# Patient Record
Sex: MALE | Race: BLACK OR AFRICAN AMERICAN | Employment: OTHER | URBAN - METROPOLITAN AREA
[De-identification: names, ages, dates, MRNs, and addresses within clinical notes are randomized per-mention and may not be internally consistent; named-entity substitution may affect disease eponyms.]

---

## 2018-04-02 ENCOUNTER — HOSPITAL ENCOUNTER (EMERGENCY)
Age: 43
Discharge: HOME | End: 2018-04-03
Attending: EMERGENCY MEDICINE
Payer: COMMERCIAL

## 2018-04-02 VITALS
DIASTOLIC BLOOD PRESSURE: 87 MMHG | HEIGHT: 69 IN | WEIGHT: 315 LBS | BODY MASS INDEX: 46.65 KG/M2 | TEMPERATURE: 97.9 F | SYSTOLIC BLOOD PRESSURE: 134 MMHG | OXYGEN SATURATION: 97 % | HEART RATE: 102 BPM | RESPIRATION RATE: 18 BRPM

## 2018-04-02 DIAGNOSIS — H66.005 RECURRENT ACUTE SUPPURATIVE OTITIS MEDIA WITHOUT SPONTANEOUS RUPTURE OF LEFT TYMPANIC MEMBRANE: Primary | ICD-10-CM

## 2018-04-02 PROCEDURE — 99283 EMERGENCY DEPT VISIT LOW MDM: CPT

## 2018-04-03 PROCEDURE — 74011250637 HC RX REV CODE- 250/637: Performed by: EMERGENCY MEDICINE

## 2018-04-03 RX ORDER — AMOXICILLIN 250 MG/1
500 CAPSULE ORAL
Status: COMPLETED | OUTPATIENT
Start: 2018-04-03 | End: 2018-04-03

## 2018-04-03 RX ORDER — AMOXICILLIN 500 MG/1
500 TABLET, FILM COATED ORAL 3 TIMES DAILY
Qty: 1 TAB | Refills: 0 | Status: SHIPPED | OUTPATIENT
Start: 2018-04-03 | End: 2018-04-13

## 2018-04-03 RX ADMIN — AMOXICILLIN 500 MG: 250 CAPSULE ORAL at 00:49

## 2018-04-03 NOTE — ED NOTES
Emergency Department Nursing Plan of Care       The Nursing Plan of Care is developed from the Nursing assessment and Emergency Department Attending provider initial evaluation. The plan of care may be reviewed in the ED Provider note.     The Plan of Care was developed with the following considerations:   Patient / Family readiness to learn indicated by:verbalized understanding  Persons(s) to be included in education: patient  Barriers to Learning/Limitations:No    Signed     Marcy Moore RN    4/2/2018   11:38 PM

## 2018-04-03 NOTE — ED PROVIDER NOTES
EMERGENCY DEPARTMENT HISTORY AND PHYSICAL EXAM      Date: 4/2/2018  Patient Name: Arron Oppenheim    History of Presenting Illness     Chief Complaint   Patient presents with    Ear Pain     patient reports to ed with complaints of left ear pain x 2 weeks. patient reports 8/10 pain       History Provided By: Patient    HPI: Arron Oppenheim, 43 y.o. male who presents ambulatory to the ED with cc of gradually worsening left ear pain that onset 2 weeks ago. He pt reports associated pain with swallowing. Pt reports taking tylenol with little relief of his symptoms. He denies a hx of similar symptoms. Pt denies any hearing loss, facial swelling, sore throat, nausea, vomiting, HA, fevers, or chills. + tobacco use (0.5 ppd), + EtOH use, + Illicit drug use    PCP: Tahir Chinchilla MD    There are no other complaints, changes, or physical findings at this time. Current Facility-Administered Medications   Medication Dose Route Frequency Provider Last Rate Last Dose    amoxicillin (AMOXIL) capsule 500 mg  500 mg Oral NOW Jyoti Salmeron MD         Current Outpatient Prescriptions   Medication Sig Dispense Refill    amoxicillin 500 mg tab Take 500 mg by mouth three (3) times daily for 10 days. 1 Tab 0    diphenhydrAMINE (BENADRYL) 25 mg capsule Take 25 mg by mouth every six (6) hours as needed.  traMADol (ULTRAM) 50 mg tablet Take 1 Tab by mouth every six (6) hours as needed for Pain. Max Daily Amount: 200 mg. 20 Tab 0       Past History     Past Medical History:  History reviewed. No pertinent past medical history. Past Surgical History:  Past Surgical History:   Procedure Laterality Date    HX HERNIA REPAIR         Family History:  History reviewed. No pertinent family history.     Social History:  Social History   Substance Use Topics    Smoking status: Current Every Day Smoker     Packs/day: 0.50    Smokeless tobacco: Never Used    Alcohol use 1.0 oz/week     2 Cans of beer per week      Comment: social       Allergies: Allergies   Allergen Reactions    Nsaids (Non-Steroidal Anti-Inflammatory Drug) Swelling    Aspirin Angioedema    Ibuprofen Angioedema    Naproxen Angioedema         Review of Systems   Review of Systems   Constitutional: Negative for chills and fever. HENT: Positive for ear pain (left) and trouble swallowing (pain). Negative for congestion, facial swelling, hearing loss, rhinorrhea, sneezing and sore throat. Eyes: Negative for redness and visual disturbance. Respiratory: Negative for shortness of breath. Cardiovascular: Negative for chest pain and leg swelling. Gastrointestinal: Negative for abdominal pain, nausea and vomiting. Genitourinary: Negative for difficulty urinating and frequency. Musculoskeletal: Negative for back pain, myalgias and neck stiffness. Skin: Negative for rash. Neurological: Negative for dizziness, syncope, weakness and headaches. Hematological: Negative for adenopathy. All other systems reviewed and are negative. Physical Exam   Physical Exam   Constitutional: He is oriented to person, place, and time. He appears well-developed and well-nourished. No distress. NAD   HENT:   Head: Normocephalic and atraumatic. Right Ear: Tympanic membrane and ear canal normal.   Left Ear: Ear canal normal. Tympanic membrane is erythematous. Mouth/Throat: Oropharynx is clear and moist. No oropharyngeal exudate or posterior oropharyngeal erythema. Fluid behind left TM   Neck: Normal range of motion and full passive range of motion without pain. Neck supple. Cardiovascular: Normal rate, regular rhythm, normal heart sounds, intact distal pulses and normal pulses. Exam reveals no gallop and no friction rub. No murmur heard. Pulmonary/Chest: Effort normal and breath sounds normal. No accessory muscle usage. No respiratory distress. He has no decreased breath sounds. He has no wheezes. He has no rhonchi. He has no rales.    Lungs are CTA Abdominal: Soft. Bowel sounds are normal. He exhibits no distension. There is no tenderness. There is no rebound, no guarding and no CVA tenderness. Musculoskeletal: Normal range of motion. He exhibits no edema or tenderness. Thoracic back: He exhibits no tenderness and no bony tenderness. Lumbar back: He exhibits no tenderness and no bony tenderness. Lymphadenopathy:     He has no cervical adenopathy. He has no axillary adenopathy. No lymph adenopathy   Neurological: He is alert and oriented to person, place, and time. He has normal strength. He is not disoriented. No cranial nerve deficit or sensory deficit. No focal deficits; 5/5 muscle strength in all extremities   Skin: Skin is warm. No lesion and no rash noted. Rash is not nodular. He is not diaphoretic. Cap refill < 2 seconds   Nursing note and vitals reviewed. Medical Decision Making   I am the first provider for this patient. I reviewed the vital signs, available nursing notes, past medical history, past surgical history, family history and social history. Vital Signs-Reviewed the patient's vital signs. Patient Vitals for the past 12 hrs:   Temp Pulse Resp BP SpO2   04/02/18 2315 97.9 °F (36.6 °C) (!) 102 18 134/87 97 %     Records Reviewed: Nursing Notes and Old Medical Records    Provider Notes (Medical Decision Making):     Otitis media. Unlikely otitis externa. Possible viral illness    ED Course:   Initial assessment performed. The patients presenting problems have been discussed, and they are in agreement with the care plan formulated and outlined with them. I have encouraged them to ask questions as they arise throughout their visit. Disposition:    DISCHARGE NOTE  12:23 AM  The patient has been re-evaluated and is ready for discharge. Reviewed available results with patient. Counseled patient on diagnosis and care plan. Patient has expressed understanding, and all questions have been answered.  Patient agrees with plan and agrees to follow up as recommended, or return to the ED if their symptoms worsen. Discharge instructions have been provided and explained to the patient, along with reasons to return to the ED. PLAN:  1. Current Discharge Medication List      START taking these medications    Details   amoxicillin 500 mg tab Take 500 mg by mouth three (3) times daily for 10 days. Qty: 1 Tab, Refills: 0           2. Follow-up Information     Follow up With Details Comments [address], [place]   [address] [facility],[address]  [address] [place] [id number] [phone]          Return to ED if worse     Diagnosis     Clinical Impression:   1. Recurrent acute suppurative otitis media without spontaneous rupture of left tympanic membrane        Attestations: This note is prepared by Christine Carrion, acting as Scribe for Mulu Heard MD.    Mulu Heard MD: The scribe's documentation has been prepared under my direction and personally reviewed by me in its entirety. I confirm that the note above accurately reflects all work, treatment, procedures, and medical decision making performed by me.

## 2018-04-24 ENCOUNTER — APPOINTMENT (OUTPATIENT)
Dept: CT IMAGING | Age: 43
End: 2018-04-24
Attending: EMERGENCY MEDICINE
Payer: COMMERCIAL

## 2018-04-24 ENCOUNTER — HOSPITAL ENCOUNTER (EMERGENCY)
Age: 43
Discharge: HOME | End: 2018-04-24
Attending: EMERGENCY MEDICINE
Payer: COMMERCIAL

## 2018-04-24 ENCOUNTER — HOSPITAL ENCOUNTER (EMERGENCY)
Age: 43
Discharge: ANOTHER HOSPITAL | End: 2018-04-24
Attending: EMERGENCY MEDICINE | Admitting: EMERGENCY MEDICINE
Payer: COMMERCIAL

## 2018-04-24 VITALS
TEMPERATURE: 97.7 F | DIASTOLIC BLOOD PRESSURE: 86 MMHG | SYSTOLIC BLOOD PRESSURE: 134 MMHG | OXYGEN SATURATION: 97 % | HEART RATE: 86 BPM | RESPIRATION RATE: 18 BRPM

## 2018-04-24 VITALS
SYSTOLIC BLOOD PRESSURE: 132 MMHG | OXYGEN SATURATION: 98 % | HEART RATE: 77 BPM | DIASTOLIC BLOOD PRESSURE: 65 MMHG | RESPIRATION RATE: 19 BRPM | HEIGHT: 69 IN | BODY MASS INDEX: 46.65 KG/M2 | TEMPERATURE: 98.1 F | WEIGHT: 315 LBS

## 2018-04-24 DIAGNOSIS — J36 TONSIL, ABSCESS: Primary | ICD-10-CM

## 2018-04-24 LAB
ANION GAP SERPL CALC-SCNC: 6 MMOL/L (ref 5–15)
BASOPHILS # BLD: 0 K/UL (ref 0–0.1)
BASOPHILS NFR BLD: 0 % (ref 0–1)
BUN SERPL-MCNC: 12 MG/DL (ref 6–20)
BUN/CREAT SERPL: 12 (ref 12–20)
CALCIUM SERPL-MCNC: 8.7 MG/DL (ref 8.5–10.1)
CHLORIDE SERPL-SCNC: 103 MMOL/L (ref 97–108)
CO2 SERPL-SCNC: 29 MMOL/L (ref 21–32)
CREAT SERPL-MCNC: 1 MG/DL (ref 0.7–1.3)
DIFFERENTIAL METHOD BLD: NORMAL
EOSINOPHIL # BLD: 0.2 K/UL (ref 0–0.4)
EOSINOPHIL NFR BLD: 3 % (ref 0–7)
ERYTHROCYTE [DISTWIDTH] IN BLOOD BY AUTOMATED COUNT: 13.2 % (ref 11.5–14.5)
GLUCOSE SERPL-MCNC: 122 MG/DL (ref 65–100)
HCT VFR BLD AUTO: 44.1 % (ref 36.6–50.3)
HGB BLD-MCNC: 14.9 G/DL (ref 12.1–17)
IMM GRANULOCYTES # BLD: 0 K/UL (ref 0–0.04)
IMM GRANULOCYTES NFR BLD AUTO: 0 % (ref 0–0.5)
LYMPHOCYTES # BLD: 3.1 K/UL (ref 0.8–3.5)
LYMPHOCYTES NFR BLD: 38 % (ref 12–49)
MCH RBC QN AUTO: 28.4 PG (ref 26–34)
MCHC RBC AUTO-ENTMCNC: 33.8 G/DL (ref 30–36.5)
MCV RBC AUTO: 84 FL (ref 80–99)
MONOCYTES # BLD: 0.6 K/UL (ref 0–1)
MONOCYTES NFR BLD: 7 % (ref 5–13)
NEUTS SEG # BLD: 4.4 K/UL (ref 1.8–8)
NEUTS SEG NFR BLD: 53 % (ref 32–75)
NRBC # BLD: 0 K/UL (ref 0–0.01)
NRBC BLD-RTO: 0 PER 100 WBC
PLATELET # BLD AUTO: 265 K/UL (ref 150–400)
PMV BLD AUTO: 9.5 FL (ref 8.9–12.9)
POTASSIUM SERPL-SCNC: 3.8 MMOL/L (ref 3.5–5.1)
RBC # BLD AUTO: 5.25 M/UL (ref 4.1–5.7)
SODIUM SERPL-SCNC: 138 MMOL/L (ref 136–145)
WBC # BLD AUTO: 8.3 K/UL (ref 4.1–11.1)

## 2018-04-24 PROCEDURE — 80048 BASIC METABOLIC PNL TOTAL CA: CPT

## 2018-04-24 PROCEDURE — 96375 TX/PRO/DX INJ NEW DRUG ADDON: CPT

## 2018-04-24 PROCEDURE — 99284 EMERGENCY DEPT VISIT MOD MDM: CPT

## 2018-04-24 PROCEDURE — 74011250636 HC RX REV CODE- 250/636: Performed by: EMERGENCY MEDICINE

## 2018-04-24 PROCEDURE — 74011250637 HC RX REV CODE- 250/637: Performed by: EMERGENCY MEDICINE

## 2018-04-24 PROCEDURE — 99283 EMERGENCY DEPT VISIT LOW MDM: CPT

## 2018-04-24 PROCEDURE — 36415 COLL VENOUS BLD VENIPUNCTURE: CPT

## 2018-04-24 PROCEDURE — 96365 THER/PROPH/DIAG IV INF INIT: CPT

## 2018-04-24 PROCEDURE — 70491 CT SOFT TISSUE NECK W/DYE: CPT

## 2018-04-24 PROCEDURE — 85025 COMPLETE CBC W/AUTO DIFF WBC: CPT

## 2018-04-24 PROCEDURE — 74011000258 HC RX REV CODE- 258: Performed by: EMERGENCY MEDICINE

## 2018-04-24 PROCEDURE — 74011636320 HC RX REV CODE- 636/320: Performed by: EMERGENCY MEDICINE

## 2018-04-24 RX ORDER — HYDROCODONE BITARTRATE AND ACETAMINOPHEN 5; 325 MG/1; MG/1
1 TABLET ORAL
Qty: 12 TAB | Refills: 0 | Status: SHIPPED | OUTPATIENT
Start: 2018-04-24 | End: 2018-05-30

## 2018-04-24 RX ORDER — DEXAMETHASONE SODIUM PHOSPHATE 100 MG/10ML
10 INJECTION INTRAMUSCULAR; INTRAVENOUS
Status: COMPLETED | OUTPATIENT
Start: 2018-04-24 | End: 2018-04-24

## 2018-04-24 RX ORDER — HYDROCODONE BITARTRATE AND ACETAMINOPHEN 5; 325 MG/1; MG/1
2 TABLET ORAL
Status: COMPLETED | OUTPATIENT
Start: 2018-04-24 | End: 2018-04-24

## 2018-04-24 RX ORDER — CLINDAMYCIN HYDROCHLORIDE 150 MG/1
300 CAPSULE ORAL
Status: COMPLETED | OUTPATIENT
Start: 2018-04-24 | End: 2018-04-24

## 2018-04-24 RX ORDER — SODIUM CHLORIDE 0.9 % (FLUSH) 0.9 %
5-10 SYRINGE (ML) INJECTION
Status: COMPLETED | OUTPATIENT
Start: 2018-04-24 | End: 2018-04-24

## 2018-04-24 RX ORDER — CLINDAMYCIN HYDROCHLORIDE 300 MG/1
300 CAPSULE ORAL 4 TIMES DAILY
Qty: 28 CAP | Refills: 0 | Status: SHIPPED | OUTPATIENT
Start: 2018-04-24 | End: 2018-05-01

## 2018-04-24 RX ADMIN — Medication 10 ML: at 10:22

## 2018-04-24 RX ADMIN — DEXAMETHASONE SODIUM PHOSPHATE 10 MG: 10 INJECTION INTRAMUSCULAR; INTRAVENOUS at 13:03

## 2018-04-24 RX ADMIN — CLINDAMYCIN HYDROCHLORIDE 300 MG: 150 CAPSULE ORAL at 14:48

## 2018-04-24 RX ADMIN — HYDROCODONE BITARTRATE AND ACETAMINOPHEN 2 TABLET: 5; 325 TABLET ORAL at 09:38

## 2018-04-24 RX ADMIN — SODIUM CHLORIDE 3 G: 900 INJECTION, SOLUTION INTRAVENOUS at 12:13

## 2018-04-24 RX ADMIN — IOPAMIDOL 100 ML: 612 INJECTION, SOLUTION INTRAVENOUS at 10:22

## 2018-04-24 NOTE — LETTER
[address] 
[address] [facility] [id number] 
[phone] Work/School Note Date: 4/24/2018 To Whom It May concern: 
 
Rommel Nuno was seen and treated today in the emergency room by the following provider(s): 
Attending Provider: Melissa Ragland MD.   
 
Rommel Nuno may return to work on 4/27/2018.  
 
Sincerely, 
 
 
 
 
Melissa Ragland MD

## 2018-04-24 NOTE — ED NOTES
Patient has been instructed that they have been given narco which contains opioids, benzodiazepines, or other sedating drugs. Patient is aware that they  will need to refrain from driving or operating heavy machinery after taking this medication. Patient also instructed that they need to avoid drinking alcohol and using other products containing opioids, benzodiazepines, or other sedating drugs. Patient verbalized understanding.

## 2018-04-24 NOTE — ED PROVIDER NOTES
HPI Comments: Pt with lt ear and thrat pain for > 2 weeks, was seen prior and took ABX, has had no improvement at all. Pt states that the pain is constant, lt face, neck and ear, occasionally worsening with episodes of sharp pain    Pt is able to eat and drink, but it does cause pain, no fevers or chills    No chest pain or shortness of breath    The history is provided by the patient and a relative. No past medical history on file. Past Surgical History:   Procedure Laterality Date    HX HERNIA REPAIR           No family history on file. Social History     Social History    Marital status: SINGLE     Spouse name: N/A    Number of children: N/A    Years of education: N/A     Occupational History    Not on file. Social History Main Topics    Smoking status: Current Every Day Smoker     Packs/day: 0.50    Smokeless tobacco: Never Used    Alcohol use 1.0 oz/week     2 Cans of beer per week      Comment: social    Drug use: Yes     Special: Marijuana    Sexual activity: Not on file     Other Topics Concern    Not on file     Social History Narrative         ALLERGIES: Nsaids (non-steroidal anti-inflammatory drug); Aspirin; Ibuprofen; and Naproxen    Review of Systems   Constitutional: Negative for fatigue and fever. HENT: Negative for congestion. Eyes: Negative for visual disturbance. Respiratory: Negative for shortness of breath. Cardiovascular: Negative for chest pain and leg swelling. Gastrointestinal: Negative for abdominal pain. Endocrine: Negative for polyuria. Genitourinary: Negative for dysuria. Skin: Negative for rash. Neurological: Negative for weakness. Psychiatric/Behavioral: Negative for behavioral problems. There were no vitals filed for this visit. Physical Exam   Constitutional: He is oriented to person, place, and time. He appears well-developed and well-nourished. HENT:   Head: Normocephalic and atraumatic.        Right Ear: Tympanic membrane normal.   Left Ear: There is swelling and tenderness. No mastoid tenderness. Tympanic membrane is injected, erythematous and bulging. A middle ear effusion is present. Nose: Mucosal edema present. Mouth/Throat: No trismus in the jaw. No uvula swelling or lacerations. No oropharyngeal exudate or tonsillar abscesses. Pt with some fullness of the soft palate and ?able mild asymmetry     Eyes: Conjunctivae are normal.   Neck: Neck supple. No tracheal deviation present. Cardiovascular: Normal rate, regular rhythm and normal heart sounds. Pulmonary/Chest: Effort normal and breath sounds normal. No respiratory distress. Abdominal: Soft. He exhibits no distension. There is no tenderness. Musculoskeletal: Normal range of motion. He exhibits no edema. Lymphadenopathy:     He has no cervical adenopathy. Neurological: He is alert and oriented to person, place, and time. No cranial nerve deficit. Grossly intact   Skin: Skin is warm and dry. Psychiatric: He has a normal mood and affect. Vitals reviewed. MDM  Number of Diagnoses or Management Options  Tonsil, abscess:         ED Course   Value Comment By Time   Glucose: (!) 122 (Reviewed) Jeremiah Swift MD 04/24 1002    Impression     IMPRESSION: Left tonsillar enlargement with suggestion of a small somewhat  linear fluid collection concerning for tonsillar abscess. Minimal opacification  of the inferior left mastoid air cells.  Jeremiah Swift MD 04/24 1105    Dr Harshil Wallace accepted, will review with ENT to facilitate transfer Jeremiah Swift MD 04/24 1137    Given failed outpt ABX therapy, voice change and morbid obesity, pt would benefit from prompt ENT eval and possibly period of inpt Airway obs Jeremiah Swift MD 04/24 1144       CRITICAL CARE (ASAP ONLY)  Performed by: Tila Almonte by: Hoa Simpson     Critical care provider statement:     Critical care time (minutes):  15    Critical care was necessary to treat or prevent imminent or life-threatening deterioration of the following conditions: airway.     Critical care was time spent personally by me on the following activities:  Examination of patient, discussions with consultants, ordering and performing treatments and interventions, ordering and review of radiographic studies, ordering and review of laboratory studies and review of old charts

## 2018-04-24 NOTE — ED TRIAGE NOTES
Pt arrived in ER with c/o lt ear pain,seen here with same x 2 weeks ago,taking abt as per prescription but no help,till in pain. Neumeric pain scale 10/10. Denies sob,chest pain,n/v/d,fever,chills. Emergency Department Nursing Plan of Care       The Nursing Plan of Care is developed from the Nursing assessment and Emergency Department Attending provider initial evaluation. The plan of care may be reviewed in the ED Provider note.     The Plan of Care was developed with the following considerations:   Patient / Family readiness to learn indicated by:verbalized understanding  Persons(s) to be included in education: patient and family  Barriers to Learning/Limitations:No    Signed     Alvin Carrera RN    4/24/2018   9:23 AM

## 2018-04-24 NOTE — ED NOTES
Patient discharged home by Dr. Andrew Leonard. Prescriptions and discharge instructions given. Patient verbalized understanding of discharge instructions.

## 2018-04-24 NOTE — PROGRESS NOTES
Care Management ED Note    [facility]**  If patient needs to be admitted and is stable, will need to transfer to [facility] or [facility].     Juany Sánchez, CRM

## 2018-04-24 NOTE — DISCHARGE INSTRUCTIONS
Peritonsillar Abscess: Care Instructions  Your Care Instructions    A peritonsillar abscess is a collection of pus that forms in tissues around the tonsils. It can occur as a result of strep throat or another infection. An abscess can cause severe pain and make it very hard to swallow. You will need antibiotics. In some cases, your abscess will have been drained through a needle or small incision. You may have had a sedative to help you relax. You may be unsteady after having sedation. It can take a few hours for the medicine's effects to wear off. Common side effects of sedation include nausea, vomiting, and feeling sleepy or tired. The doctor has checked you carefully, but problems can develop later. If you notice any problems or new symptoms, get medical treatment right away. Follow-up care is a key part of your treatment and safety. Be sure to make and go to all appointments, and call your doctor if you are having problems. It's also a good idea to know your test results and keep a list of the medicines you take. How can you care for yourself at home? · If the doctor gave you a sedative:  ¨ For 24 hours, don't do anything that requires attention to detail. It takes time for the medicine's effects to completely wear off. ¨ For your safety, do not drive or operate any machinery that could be dangerous. Wait until the medicine wears off and you can think clearly and react easily. · Take your antibiotics as directed. Do not stop taking them just because you feel better. You need to take the full course of antibiotics. · Take pain medicines exactly as directed. ¨ If the doctor gave you a prescription medicine for pain, take it as prescribed. ¨ If you are not taking a prescription pain medicine, ask your doctor if you can take an over-the-counter medicine, such as acetaminophen (Tylenol), ibuprofen (Advil, Motrin), or naproxen (Aleve). Read and follow all instructions on the label.   ¨ Do not take two or more pain medicines at the same time unless the doctor told you to. Many pain medicines have acetaminophen, which is Tylenol. Too much acetaminophen (Tylenol) can be harmful. · Gargle with warm salt water once an hour to help reduce swelling and relieve discomfort. Use 1 teaspoon of salt mixed in 8 fluid ounces of warm water. · Get lots of rest.  · Follow your doctor's instructions if your abscess was drained through a needle or small incision. · While your throat is very sore, use liquid nourishment such as soup or high-protein drinks. · Prevent spreading an infection. Wash your hands often, do not sneeze or cough on others, and do not share toothbrushes, eating utensils, or drinking glasses. When should you call for help? Call 911 anytime you think you may need emergency care. For example, call if:  ? · You have trouble breathing. ? · You passed out (lost consciousness). ? · You have a lot of blood coming from the mouth. ?Call your doctor now or seek immediate medical care if:  ? · You have new or worse symptoms of infection, such as:  ¨ Increased pain, swelling, warmth, or redness. ¨ Red streaks coming from the area. ¨ Pus draining from the area. ¨ A fever. ? · You are bleeding. ? · You have new or worse nausea or vomiting. ? · You have new or worse trouble swallowing. ? · You have a hard time drinking fluids. ? Watch closely for changes in your health, and be sure to contact your doctor if:  ? · You do not get better as expected. Where can you learn more? Go to http://venu-sarah.info/. Enter J026 in the search box to learn more about \"Peritonsillar Abscess: Care Instructions. \"  Current as of: May 12, 2017  Content Version: 11.4  © 4903-2183 [employer]. Care instructions adapted under license by [employer] (which disclaims liability or warranty for this information).  If you have questions about a medical condition or this instruction, always ask your healthcare professional. [place] [address] any warranty or liability for your use of this information. We hope that we have addressed all of your medical concerns. The examination and treatment you received in the Emergency Department were for an emergent problem and were not intended as complete care. It is important that you follow up with your healthcare provider(s) for ongoing care. If your symptoms worsen or do not improve as expected, and you are unable to reach your usual health care provider(s), you should return to the Emergency Department. Today's healthcare is undergoing tremendous change, and patient satisfaction surveys are one of the many tools to assess the quality of medical care. You may receive a survey from the [employer] regarding your experience in the Emergency Department. I hope that your experience has been completely positive, particularly the medical care that I provided. As such, please participate in the survey; anything less than excellent does not meet my expectations or intentions. [facility] and [address] [place] [address] participate in nationally recognized quality of care measures. If your blood pressure is greater than 120/80, as reported below, we urge that you seek medical care to address the potential of high blood pressure, commonly known as hypertension. Hypertension can be hereditary or can be caused by certain medical conditions, pain, stress, or \"white coat syndrome. \"       Please make an appointment with your health care provider(s) for follow up of your Emergency Department visit. VITALS:   No data found. Thank you for allowing us to provide you with medical care today. We realize that you have many choices for your emergency care needs. Please choose us in the future for any continued health care needs.       Stacey Pierre, MD [facility] [address]: [phone]            Recent Results (from the past 24 hour(s))   CBC WITH AUTOMATED DIFF    Collection Time: 04/24/18  9:35 AM   Result Value Ref Range    WBC 8.3 4.1 - 11.1 K/uL    RBC 5.25 4. 10 - 5.70 M/uL    HGB 14.9 12.1 - 17.0 g/dL    HCT 44.1 36.6 - 50.3 %    MCV 84.0 80.0 - 99.0 FL    MCH 28.4 26.0 - 34.0 PG    MCHC 33.8 30.0 - 36.5 g/dL    RDW 13.2 11.5 - 14.5 %    PLATELET 628 487 - 027 K/uL    MPV 9.5 8.9 - 12.9 FL    NRBC 0.0 0  WBC    ABSOLUTE NRBC 0.00 0.00 - 0.01 K/uL    NEUTROPHILS 53 32 - 75 %    LYMPHOCYTES 38 12 - 49 %    MONOCYTES 7 5 - 13 %    EOSINOPHILS 3 0 - 7 %    BASOPHILS 0 0 - 1 %    IMMATURE GRANULOCYTES 0 0.0 - 0.5 %    ABS. NEUTROPHILS 4.4 1.8 - 8.0 K/UL    ABS. LYMPHOCYTES 3.1 0.8 - 3.5 K/UL    ABS. MONOCYTES 0.6 0.0 - 1.0 K/UL    ABS. EOSINOPHILS 0.2 0.0 - 0.4 K/UL    ABS. BASOPHILS 0.0 0.0 - 0.1 K/UL    ABS. IMM. GRANS. 0.0 0.00 - 0.04 K/UL    DF AUTOMATED     METABOLIC PANEL, BASIC    Collection Time: 04/24/18  9:35 AM   Result Value Ref Range    Sodium 138 136 - 145 mmol/L    Potassium 3.8 3.5 - 5.1 mmol/L    Chloride 103 97 - 108 mmol/L    CO2 29 21 - 32 mmol/L    Anion gap 6 5 - 15 mmol/L    Glucose 122 (H) 65 - 100 mg/dL    BUN 12 6 - 20 MG/DL    Creatinine 1.00 0.70 - 1.30 MG/DL    BUN/Creatinine ratio 12 12 - 20      GFR est AA >60 >60 ml/min/1.73m2    GFR est non-AA >60 >60 ml/min/1.73m2    Calcium 8.7 8.5 - 10.1 MG/DL       Ct Neck Soft Tissue W Cont    Result Date: 4/24/2018  EXAM:  CT NECK SOFT TISSUE W CONT INDICATION:  Left ear pain for 2 weeks despite antibiotic use COMPARISON: None. CONTRAST: 100 mL of Isovue-300. TECHNIQUE: Multislice helical CT was performed from the mid calvarium to the aortic arch during uneventful rapid bolus intravenous contrast administration. Contiguous 2.5 mm axial images were reconstructed and lung and soft tissue windows were generated.  Coronal and sagittal reformations were generated. CT dose reduction was achieved through use of a standardized protocol tailored for this examination and automatic exposure control for dose modulation. FINDINGS: Left tonsillar enlargement is noted with suggestion of a small but somewhat linear 14 mm fluid collection. . The carotid and jugular vessels enhance normally. The thyroid gland, submandibular salivary glands and parotid glands are normal. No nasopharyngeal, pharyngeal or laryngeal mass is identified. No abnormalities are identified in the visualized portions of the brain or orbits. There is minimal opacification of the inferior left mastoid air cells. Paranasal sinuses are clear. The visualized portions of the lung apices are clear. IMPRESSION: Left tonsillar enlargement with suggestion of a small somewhat linear fluid collection concerning for tonsillar abscess. Minimal opacification of the inferior left mastoid air cells.

## 2018-04-24 NOTE — ED PROVIDER NOTES
HPI Comments: 43 y.o. male with no significant past medical history who presents as a transfer from [facility] for evaluation of a throat abscess. Pt reports that he has had pain in his left ear and throat for more than the last 2 weeks. He reports being seen at least 2 weeks ago, was told that he had a \"fluid bubble\" in his left ear, and was discharged home on a course of amoxicillin that he has now finished without any improvement. He presented to [address] [place] [address] with c/o continued pain and some swelling to the left side of his neck and face. Transferring doctor reported concern for the pt's airway and the pt was brought here to [facility] to have ENT consultation. Pt denies fever. He also denies having any difficulty breathing. He does note that swallowing is painful, but states that he is still able to do so. Pt notes that he was given 2 hydrocodone earlier this morning. Allergic to NSAIDs. There are no other acute medical concerns at this time. PCP: Ryan Cooper MD    Note written by Jaron Hayden, as dictated by Yasmin Brandon MD 2:03 PM      The history is provided by the patient and medical records. No  was used. History reviewed. No pertinent past medical history. Past Surgical History:   Procedure Laterality Date    HX HERNIA REPAIR           History reviewed. No pertinent family history. Social History     Social History    Marital status: SINGLE     Spouse name: N/A    Number of children: N/A    Years of education: N/A     Occupational History    Not on file.      Social History Main Topics    Smoking status: Current Every Day Smoker     Packs/day: 0.50    Smokeless tobacco: Never Used    Alcohol use 1.0 oz/week     2 Cans of beer per week      Comment: social    Drug use: Yes     Special: Marijuana    Sexual activity: Not on file     Other Topics Concern    Not on file     Social History Narrative         ALLERGIES: Nsaids (non-steroidal anti-inflammatory drug); Aspirin; Ibuprofen; and Naproxen    Review of Systems   Constitutional: Negative for diaphoresis and fever. HENT: Positive for ear pain (left), facial swelling (left side and to the left side of the neck with pain. ) and trouble swallowing (painful). Eyes: Negative for visual disturbance. Respiratory: Negative for cough and shortness of breath. Cardiovascular: Negative for chest pain. Gastrointestinal: Negative for abdominal pain. Genitourinary: Negative for dysuria. Musculoskeletal: Negative for joint swelling. Skin: Negative for rash. Neurological: Negative for headaches. Hematological: Negative for adenopathy. Psychiatric/Behavioral: Negative for suicidal ideas. All other systems reviewed and are negative. There were no vitals filed for this visit. Physical Exam   Constitutional: He is oriented to person, place, and time. He appears well-developed and well-nourished. No distress. Obese   HENT:   Head: Normocephalic and atraumatic. Mouth/Throat: Oropharynx is clear and moist.   L sided pharyngeal swelling. Visualization limited by body habitus. Eyes: Pupils are equal, round, and reactive to light. Neck: Normal range of motion. Neck supple. Cardiovascular: Normal rate, regular rhythm, normal heart sounds and intact distal pulses. Pulmonary/Chest: Effort normal and breath sounds normal. No respiratory distress. Abdominal: Soft. Bowel sounds are normal. He exhibits no distension. There is no tenderness. Musculoskeletal: Normal range of motion. He exhibits no edema. Neurological: He is alert and oriented to person, place, and time. Skin: Skin is warm and dry. Nursing note and vitals reviewed. MDM  Number of Diagnoses or Management Options  Tonsil, abscess:         ED Course       Procedures    Pt received in transfer from [facility] [place]. VS stable. Pt resting comfortably. Minimal pain at this time.   No resp distress. Given clindamycin in ED here. Pt stable for outpatient management. CONSULT NOTE:  2:47 PM Mor Silvestre MD communicated with Dr Jose Doty, Consult for ENT via [facility] Text. Discussed available diagnostic tests and clinical findings. He agrees that the pt can be discharged home on clindamycin. He will guarentee f/u within 48 hours.

## 2018-04-24 NOTE — ED TRIAGE NOTES
Triage Note: Patient is a transfer from [facility] for throat abscess. Patient is coming to see ENT. Unasyn was discontinued at [facility] unsure how much of medicine that the patient received prior to leaving facility.

## 2018-04-25 NOTE — PROGRESS NOTES
Documented on 4/25/18:    Spiritual Care Partner Volunteer visited patient in ED on 4/24/18. Documented by:  Meryl Govea M.Div.    Paging Service [phone])

## 2018-05-30 ENCOUNTER — HOSPITAL ENCOUNTER (EMERGENCY)
Age: 43
Discharge: HOME | End: 2018-05-30
Attending: EMERGENCY MEDICINE
Payer: COMMERCIAL

## 2018-05-30 VITALS
TEMPERATURE: 98.1 F | DIASTOLIC BLOOD PRESSURE: 93 MMHG | SYSTOLIC BLOOD PRESSURE: 147 MMHG | WEIGHT: 315 LBS | HEART RATE: 106 BPM | OXYGEN SATURATION: 96 % | HEIGHT: 69 IN | RESPIRATION RATE: 18 BRPM | BODY MASS INDEX: 46.65 KG/M2

## 2018-05-30 DIAGNOSIS — Z98.890 STATUS POST INCISION AND DRAINAGE: Primary | ICD-10-CM

## 2018-05-30 DIAGNOSIS — G89.18 POST-OP PAIN: ICD-10-CM

## 2018-05-30 PROCEDURE — 99282 EMERGENCY DEPT VISIT SF MDM: CPT

## 2018-05-30 RX ORDER — OXYCODONE AND ACETAMINOPHEN 5; 325 MG/1; MG/1
1 TABLET ORAL
Qty: 6 TAB | Refills: 0 | Status: SHIPPED | OUTPATIENT
Start: 2018-05-30 | End: 2018-09-25

## 2018-05-30 NOTE — DISCHARGE INSTRUCTIONS
Acute Pain After Surgery: Care Instructions  Your Care Instructions    It's common to have some pain after surgery. Pain doesn't mean that something is wrong or that the surgery didn't go well. But when the pain is severe, it's important to work with your doctor to manage it. It's also important to be aware of a few facts about pain and pain medicine. · You are the only person who knows what your pain feels like. So be sure to tell your doctor when you are in pain or when the pain changes. Then he or she will know how to adjust your medicines. · Pain is often easier to control right after it starts. So it may be better to take regular doses of pain medicine and not wait until the pain gets bad. · Medicine can help control pain. But this doesn't mean you'll have no pain. Medicine works to keep the pain at a level you can live with. With time, you will feel better. Follow-up care is a key part of your treatment and safety. Be sure to make and go to all appointments, and call your doctor if you are having problems. It's also a good idea to know your test results and keep a list of the medicines you take. How can you care for yourself at home? · Be safe with medicines. Read and follow all instructions on the label. ¨ If the doctor gave you a prescription medicine for pain, take it as prescribed. ¨ If you are not taking a prescription pain medicine, ask your doctor if you can take an over-the-counter medicine. · If you take an over-the-counter pain medicine, such as acetaminophen (Tylenol), ibuprofen (Advil, Motrin), or naproxen (Aleve), read and follow all instructions on the label. · Do not take two or more pain medicines at the same time unless the doctor told you to. · Do not drink alcohol while you are taking pain medicines. · Try to walk each day if your doctor recommends it. Start by walking a little more than you did the day before. Bit by bit, increase the amount you walk.  Walking increases blood flow. It also helps prevent pneumonia and constipation. · To prevent constipation from opioid pain medicines:  ¨ Talk to your doctor about a laxative. ¨ Include fruits, vegetables, beans, and whole grains in your diet each day. These foods are high in fiber. ¨ Drink plenty of fluids, enough so that your urine is light yellow or clear like water. Drink water, fruit juice, or other drinks that do not contain caffeine or alcohol. If you have kidney, heart, or liver disease and have to limit fluids, talk with your doctor before you increase the amount of fluids you drink. ¨ Take a fiber supplement, such as Citrucel or Metamucil, every day if needed. Read and follow all instructions on the label. If you take pain medicine for more than a few days, talk to your doctor before you take fiber. When should you call for help? Call your doctor now or seek immediate medical care if:  ? · Your pain gets worse. ? · Your pain is not controlled by medicine. ? Watch closely for changes in your health, and be sure to contact your doctor if you have any problems. Where can you learn more? Go to http://venu-sarah.info/. Enter [phone] [phone] in the search box to learn more about \"Acute Pain After Surgery: Care Instructions. \"  Current as of: March 20, 2017  Content Version: 11.4  © 6670-2373 [employer]. Care instructions adapted under license by [employer] (which disclaims liability or warranty for this information). If you have questions about a medical condition or this instruction, always ask your healthcare professional. [place] [address] any warranty or liability for your use of this information. Peritonsillar Abscess: Care Instructions  Your Care Instructions    A peritonsillar abscess is a collection of pus that forms in tissues around the tonsils. It can occur as a result of strep throat or another infection.  An abscess can cause severe pain and make it very hard to swallow. You will need antibiotics. In some cases, your abscess will have been drained through a needle or small incision. You may have had a sedative to help you relax. You may be unsteady after having sedation. It can take a few hours for the medicine's effects to wear off. Common side effects of sedation include nausea, vomiting, and feeling sleepy or tired. The doctor has checked you carefully, but problems can develop later. If you notice any problems or new symptoms, get medical treatment right away. Follow-up care is a key part of your treatment and safety. Be sure to make and go to all appointments, and call your doctor if you are having problems. It's also a good idea to know your test results and keep a list of the medicines you take. How can you care for yourself at home? · If the doctor gave you a sedative:  ¨ For 24 hours, don't do anything that requires attention to detail. It takes time for the medicine's effects to completely wear off. ¨ For your safety, do not drive or operate any machinery that could be dangerous. Wait until the medicine wears off and you can think clearly and react easily. · Take your antibiotics as directed. Do not stop taking them just because you feel better. You need to take the full course of antibiotics. · Take pain medicines exactly as directed. ¨ If the doctor gave you a prescription medicine for pain, take it as prescribed. ¨ If you are not taking a prescription pain medicine, ask your doctor if you can take an over-the-counter medicine, such as acetaminophen (Tylenol), ibuprofen (Advil, Motrin), or naproxen (Aleve). Read and follow all instructions on the label. ¨ Do not take two or more pain medicines at the same time unless the doctor told you to. Many pain medicines have acetaminophen, which is Tylenol. Too much acetaminophen (Tylenol) can be harmful. · Gargle with warm salt water once an hour to help reduce swelling and relieve discomfort. Use 1 teaspoon of salt mixed in 8 fluid ounces of warm water. · Get lots of rest.  · Follow your doctor's instructions if your abscess was drained through a needle or small incision. · While your throat is very sore, use liquid nourishment such as soup or high-protein drinks. · Prevent spreading an infection. Wash your hands often, do not sneeze or cough on others, and do not share toothbrushes, eating utensils, or drinking glasses. When should you call for help? Call 911 anytime you think you may need emergency care. For example, call if:  ? · You have trouble breathing. ? · You passed out (lost consciousness). ? · You have a lot of blood coming from the mouth. ?Call your doctor now or seek immediate medical care if:  ? · You have new or worse symptoms of infection, such as:  ¨ Increased pain, swelling, warmth, or redness. ¨ Red streaks coming from the area. ¨ Pus draining from the area. ¨ A fever. ? · You are bleeding. ? · You have new or worse nausea or vomiting. ? · You have new or worse trouble swallowing. ? · You have a hard time drinking fluids. ? Watch closely for changes in your health, and be sure to contact your doctor if:  ? · You do not get better as expected. Where can you learn more? Go to http://venu-sarah.info/. Enter Y383 in the search box to learn more about \"Peritonsillar Abscess: Care Instructions. \"  Current as of: May 12, 2017  Content Version: 11.4  © 0343-1933 [employer]. Care instructions adapted under license by [employer] (which disclaims liability or warranty for this information). If you have questions about a medical condition or this instruction, always ask your healthcare professional. [address] any warranty or liability for your use of this information.

## 2018-05-30 NOTE — ED NOTES
Seen for tonsil abscess end of April. Has followed up with ENT. Is to having outpatient procedure with ENT on 6/14 with pre-op scheduled for next Monday. Is having continued difficulty swallowing and pain on left side.   Has used antibiotics and steroids previously

## 2018-06-17 ENCOUNTER — HOSPITAL ENCOUNTER (EMERGENCY)
Age: 43
Discharge: HOME | End: 2018-06-18
Attending: EMERGENCY MEDICINE | Admitting: EMERGENCY MEDICINE
Payer: COMMERCIAL

## 2018-06-17 ENCOUNTER — APPOINTMENT (OUTPATIENT)
Dept: CT IMAGING | Age: 43
End: 2018-06-17
Attending: EMERGENCY MEDICINE
Payer: COMMERCIAL

## 2018-06-17 VITALS
TEMPERATURE: 99 F | HEART RATE: 120 BPM | DIASTOLIC BLOOD PRESSURE: 118 MMHG | OXYGEN SATURATION: 99 % | SYSTOLIC BLOOD PRESSURE: 169 MMHG | RESPIRATION RATE: 22 BRPM | BODY MASS INDEX: 46.65 KG/M2 | HEIGHT: 69 IN | WEIGHT: 315 LBS

## 2018-06-17 DIAGNOSIS — J35.8 TONSILLAR MASS: Primary | ICD-10-CM

## 2018-06-17 LAB
ANION GAP BLD CALC-SCNC: 21 MMOL/L (ref 10–20)
ANION GAP SERPL CALC-SCNC: 5 MMOL/L (ref 5–15)
BASOPHILS # BLD: 0 K/UL (ref 0–0.1)
BASOPHILS NFR BLD: 0 % (ref 0–1)
BUN BLD-MCNC: 15 MG/DL (ref 9–20)
BUN SERPL-MCNC: 13 MG/DL (ref 6–20)
BUN/CREAT SERPL: 15 (ref 12–20)
CA-I BLD-MCNC: 1.01 MMOL/L (ref 1.12–1.32)
CALCIUM SERPL-MCNC: 8.8 MG/DL (ref 8.5–10.1)
CHLORIDE BLD-SCNC: 89 MMOL/L (ref 98–107)
CHLORIDE SERPL-SCNC: 103 MMOL/L (ref 97–108)
CO2 BLD-SCNC: 22 MMOL/L (ref 21–32)
CO2 SERPL-SCNC: 29 MMOL/L (ref 21–32)
CREAT BLD-MCNC: 0.5 MG/DL (ref 0.6–1.3)
CREAT SERPL-MCNC: 0.89 MG/DL (ref 0.7–1.3)
DIFFERENTIAL METHOD BLD: ABNORMAL
EOSINOPHIL # BLD: 0.1 K/UL (ref 0–0.4)
EOSINOPHIL NFR BLD: 2 % (ref 0–7)
ERYTHROCYTE [DISTWIDTH] IN BLOOD BY AUTOMATED COUNT: 14.6 % (ref 11.5–14.5)
GLUCOSE BLD-MCNC: 80 MG/DL (ref 65–100)
GLUCOSE SERPL-MCNC: 105 MG/DL (ref 65–100)
HCT VFR BLD AUTO: 43.7 % (ref 36.6–50.3)
HCT VFR BLD CALC: 38 % (ref 36.6–50.3)
HGB BLD-MCNC: 14.8 G/DL (ref 12.1–17)
IMM GRANULOCYTES # BLD: 0 K/UL (ref 0–0.04)
IMM GRANULOCYTES NFR BLD AUTO: 0 % (ref 0–0.5)
LYMPHOCYTES # BLD: 2.4 K/UL (ref 0.8–3.5)
LYMPHOCYTES NFR BLD: 35 % (ref 12–49)
MCH RBC QN AUTO: 28.9 PG (ref 26–34)
MCHC RBC AUTO-ENTMCNC: 33.9 G/DL (ref 30–36.5)
MCV RBC AUTO: 85.4 FL (ref 80–99)
MONOCYTES # BLD: 0.7 K/UL (ref 0–1)
MONOCYTES NFR BLD: 10 % (ref 5–13)
NEUTS SEG # BLD: 3.6 K/UL (ref 1.8–8)
NEUTS SEG NFR BLD: 53 % (ref 32–75)
NRBC # BLD: 0 K/UL (ref 0–0.01)
NRBC BLD-RTO: 0 PER 100 WBC
PLATELET # BLD AUTO: 278 K/UL (ref 150–400)
PMV BLD AUTO: 9.8 FL (ref 8.9–12.9)
POTASSIUM BLD-SCNC: 4.3 MMOL/L (ref 3.5–5.1)
POTASSIUM SERPL-SCNC: 5.5 MMOL/L (ref 3.5–5.1)
RBC # BLD AUTO: 5.12 M/UL (ref 4.1–5.7)
SERVICE CMNT-IMP: ABNORMAL
SODIUM BLD-SCNC: 126 MMOL/L (ref 136–145)
SODIUM SERPL-SCNC: 137 MMOL/L (ref 136–145)
WBC # BLD AUTO: 6.8 K/UL (ref 4.1–11.1)

## 2018-06-17 PROCEDURE — 99283 EMERGENCY DEPT VISIT LOW MDM: CPT

## 2018-06-17 PROCEDURE — 74011250636 HC RX REV CODE- 250/636: Performed by: EMERGENCY MEDICINE

## 2018-06-17 PROCEDURE — 96366 THER/PROPH/DIAG IV INF ADDON: CPT

## 2018-06-17 PROCEDURE — 74011636320 HC RX REV CODE- 636/320: Performed by: EMERGENCY MEDICINE

## 2018-06-17 PROCEDURE — 70491 CT SOFT TISSUE NECK W/DYE: CPT

## 2018-06-17 PROCEDURE — 80048 BASIC METABOLIC PNL TOTAL CA: CPT | Performed by: EMERGENCY MEDICINE

## 2018-06-17 PROCEDURE — 80047 BASIC METABLC PNL IONIZED CA: CPT

## 2018-06-17 PROCEDURE — 96375 TX/PRO/DX INJ NEW DRUG ADDON: CPT

## 2018-06-17 PROCEDURE — 36415 COLL VENOUS BLD VENIPUNCTURE: CPT | Performed by: EMERGENCY MEDICINE

## 2018-06-17 PROCEDURE — 96365 THER/PROPH/DIAG IV INF INIT: CPT

## 2018-06-17 PROCEDURE — 85025 COMPLETE CBC W/AUTO DIFF WBC: CPT | Performed by: EMERGENCY MEDICINE

## 2018-06-17 RX ORDER — SODIUM CHLORIDE 0.9 % (FLUSH) 0.9 %
10 SYRINGE (ML) INJECTION
Status: COMPLETED | OUTPATIENT
Start: 2018-06-17 | End: 2018-06-17

## 2018-06-17 RX ORDER — DEXAMETHASONE SODIUM PHOSPHATE 100 MG/10ML
10 INJECTION INTRAMUSCULAR; INTRAVENOUS
Status: COMPLETED | OUTPATIENT
Start: 2018-06-17 | End: 2018-06-17

## 2018-06-17 RX ORDER — CLINDAMYCIN PHOSPHATE 600 MG/50ML
600 INJECTION INTRAVENOUS
Status: COMPLETED | OUTPATIENT
Start: 2018-06-17 | End: 2018-06-18

## 2018-06-17 RX ORDER — MORPHINE SULFATE 2 MG/ML
4 INJECTION, SOLUTION INTRAMUSCULAR; INTRAVENOUS
Status: COMPLETED | OUTPATIENT
Start: 2018-06-17 | End: 2018-06-17

## 2018-06-17 RX ADMIN — MORPHINE SULFATE 4 MG: 2 INJECTION, SOLUTION INTRAMUSCULAR; INTRAVENOUS at 23:00

## 2018-06-17 RX ADMIN — SODIUM CHLORIDE 1000 ML: 900 INJECTION, SOLUTION INTRAVENOUS at 22:45

## 2018-06-17 RX ADMIN — DEXAMETHASONE SODIUM PHOSPHATE 10 MG: 10 INJECTION INTRAMUSCULAR; INTRAVENOUS at 22:45

## 2018-06-17 RX ADMIN — Medication 10 ML: at 23:11

## 2018-06-17 RX ADMIN — CLINDAMYCIN PHOSPHATE 600 MG: 600 INJECTION INTRAVENOUS at 22:44

## 2018-06-17 RX ADMIN — IOPAMIDOL 100 ML: 612 INJECTION, SOLUTION INTRAVENOUS at 23:11

## 2018-06-18 PROCEDURE — 74011250636 HC RX REV CODE- 250/636: Performed by: EMERGENCY MEDICINE

## 2018-06-18 PROCEDURE — 96375 TX/PRO/DX INJ NEW DRUG ADDON: CPT

## 2018-06-18 RX ORDER — CLINDAMYCIN HYDROCHLORIDE 150 MG/1
300 CAPSULE ORAL 3 TIMES DAILY
Qty: 42 CAP | Refills: 0 | Status: SHIPPED | OUTPATIENT
Start: 2018-06-18 | End: 2018-06-25

## 2018-06-18 RX ORDER — FENTANYL CITRATE 50 UG/ML
50 INJECTION, SOLUTION INTRAMUSCULAR; INTRAVENOUS
Status: COMPLETED | OUTPATIENT
Start: 2018-06-18 | End: 2018-06-18

## 2018-06-18 RX ORDER — OXYCODONE AND ACETAMINOPHEN 5; 325 MG/1; MG/1
1 TABLET ORAL
Qty: 6 TAB | Refills: 0 | Status: SHIPPED | OUTPATIENT
Start: 2018-06-18 | End: 2018-08-29

## 2018-06-18 RX ADMIN — FENTANYL CITRATE 50 MCG: 50 INJECTION, SOLUTION INTRAMUSCULAR; INTRAVENOUS at 00:45

## 2018-06-18 NOTE — ED PROVIDER NOTES
EMERGENCY DEPARTMENT HISTORY AND PHYSICAL EXAM      Date: 6/17/2018  Patient Name: Porsche Stubbs    History of Presenting Illness     Chief Complaint   Patient presents with    Sore Throat     pt reports sore throat x2 months with difficulty swallowing, pt reports he had a biopsy performed Thursday by [facility] and Throat       History Provided By: Patient    HPI: Porsche Stubbs, 43 y.o. male with PMHx significant for tonsillar abscess, presents ambulatory to the ED with cc of worsening sore throat with associated dysphagia x 2 months, with exacerbation ~ midday today. Pt also c/o dizziness and lightheadedness. Pt notes that he was diagnosed with a tonsillar abscess and is being followed by ENT. He reports that he had a biopsy performed 3 days ago by [facility], and notes that he should be getting a call from them tomorrow to go over his results. Pt states that his pain radiates through his L jaw and neck. He notes that she was told to start taking abx but denies compliance. Pt reports that he has difficulty swallowing water, and has to take small gulps. Pt denies any modifying factors for his sxs. He specifically denies any fevers, chills, congestion, cough, N/V/D, SOB, CP, HA, dysuria or hematuria. There are no other complaints, changes, or physical findings at this time. PCP: Sen Stein MD   Social Hx: + EtOH; + Smoker; - Illicit Drugs    Current Facility-Administered Medications   Medication Dose Route Frequency Provider Last Rate Last Dose    fentaNYL citrate (PF) injection 50 mcg  50 mcg IntraVENous NOW Ramo Marquez MD         Current Outpatient Prescriptions   Medication Sig Dispense Refill    PREDNISONE PO Take  by mouth.  oxyCODONE-acetaminophen (PERCOCET) 5-325 mg per tablet Take 1 Tab by mouth every four (4) hours as needed for Pain. Max Daily Amount: 6 Tabs. 6 Tab 0    diphenhydrAMINE (BENADRYL) 25 mg capsule Take 25 mg by mouth every six (6) hours as needed. Past History     Past Medical History:  Past Medical History:   Diagnosis Date    Tonsillar abscess 05/2018       Past Surgical History:  Past Surgical History:   Procedure Laterality Date    HX HERNIA REPAIR         Family History:  History reviewed. No pertinent family history. Social History:  Social History   Substance Use Topics    Smoking status: Current Every Day Smoker     Packs/day: 0.50    Smokeless tobacco: Never Used    Alcohol use 1.0 oz/week     2 Cans of beer per week      Comment: social       Allergies: Allergies   Allergen Reactions    Nsaids (Non-Steroidal Anti-Inflammatory Drug) Swelling    Aspirin Angioedema    Ibuprofen Angioedema    Naproxen Angioedema         Review of Systems   Review of Systems   Constitutional: Negative. Negative for fever. HENT: Positive for sore throat and trouble swallowing. Negative for congestion, drooling and facial swelling. Eyes: Negative. Negative for discharge and redness. Respiratory: Negative. Negative for chest tightness, shortness of breath and wheezing. Cardiovascular: Negative. Negative for chest pain. Gastrointestinal: Negative. Negative for abdominal distention, abdominal pain, constipation, diarrhea, nausea and vomiting. Endocrine: Negative. Genitourinary: Negative. Negative for difficulty urinating and dysuria. Musculoskeletal: Negative. Negative for arthralgias and myalgias. Skin: Negative. Negative for color change and rash. Allergic/Immunologic: Negative. Neurological: Negative. Negative for syncope, facial asymmetry and speech difficulty. Hematological: Negative. Psychiatric/Behavioral: Negative. Negative for agitation and confusion. All other systems reviewed and are negative. Physical Exam   Physical Exam   Constitutional: He is oriented to person, place, and time. He appears well-developed and well-nourished. HENT:   Head: Normocephalic and atraumatic.    Ulceration back L side of throat w/ central purulent tissue   Eyes: Conjunctivae and EOM are normal.   Neck: Neck supple. Cardiovascular: Normal rate, regular rhythm and intact distal pulses. Pulmonary/Chest: No accessory muscle usage. No respiratory distress. Abdominal: Soft. Normal appearance. There is no tenderness. Musculoskeletal: Normal range of motion. Neurological: He is alert and oriented to person, place, and time. Skin: Skin is warm and dry. Psychiatric: He has a normal mood and affect. His behavior is normal. Thought content normal.   Nursing note and vitals reviewed. Diagnostic Study Results     Labs -     Recent Results (from the past 12 hour(s))   CBC WITH AUTOMATED DIFF    Collection Time: 06/17/18 10:17 PM   Result Value Ref Range    WBC 6.8 4.1 - 11.1 K/uL    RBC 5.12 4.10 - 5.70 M/uL    HGB 14.8 12.1 - 17.0 g/dL    HCT 43.7 36.6 - 50.3 %    MCV 85.4 80.0 - 99.0 FL    MCH 28.9 26.0 - 34.0 PG    MCHC 33.9 30.0 - 36.5 g/dL    RDW 14.6 (H) 11.5 - 14.5 %    PLATELET 690 890 - 068 K/uL    MPV 9.8 8.9 - 12.9 FL    NRBC 0.0 0  WBC    ABSOLUTE NRBC 0.00 0.00 - 0.01 K/uL    NEUTROPHILS 53 32 - 75 %    LYMPHOCYTES 35 12 - 49 %    MONOCYTES 10 5 - 13 %    EOSINOPHILS 2 0 - 7 %    BASOPHILS 0 0 - 1 %    IMMATURE GRANULOCYTES 0 0.0 - 0.5 %    ABS. NEUTROPHILS 3.6 1.8 - 8.0 K/UL    ABS. LYMPHOCYTES 2.4 0.8 - 3.5 K/UL    ABS. MONOCYTES 0.7 0.0 - 1.0 K/UL    ABS. EOSINOPHILS 0.1 0.0 - 0.4 K/UL    ABS. BASOPHILS 0.0 0.0 - 0.1 K/UL    ABS. IMM.  GRANS. 0.0 0.00 - 0.04 K/UL    DF AUTOMATED     METABOLIC PANEL, BASIC    Collection Time: 06/17/18 10:17 PM   Result Value Ref Range    Sodium 137 136 - 145 mmol/L    Potassium 5.5 (H) 3.5 - 5.1 mmol/L    Chloride 103 97 - 108 mmol/L    CO2 29 21 - 32 mmol/L    Anion gap 5 5 - 15 mmol/L    Glucose 105 (H) 65 - 100 mg/dL    BUN 13 6 - 20 MG/DL    Creatinine 0.89 0.70 - 1.30 MG/DL    BUN/Creatinine ratio 15 12 - 20      GFR est AA >60 >60 ml/min/1.73m2    GFR est non-AA >60 >60 ml/min/1.73m2    Calcium 8.8 8.5 - 10.1 MG/DL   POC CHEM8    Collection Time: 06/17/18 11:34 PM   Result Value Ref Range    Calcium, ionized (POC) 1.01 (L) 1.12 - 1.32 mmol/L    Sodium (POC) 126 (L) 136 - 145 mmol/L    Potassium (POC) 4.3 3.5 - 5.1 mmol/L    Chloride (POC) 89 (L) 98 - 107 mmol/L    CO2 (POC) 22 21 - 32 mmol/L    Anion gap (POC) 21 (H) 10 - 20 mmol/L    Glucose (POC) 80 65 - 100 mg/dL    BUN (POC) 15 9 - 20 mg/dL    Creatinine (POC) 0.5 (L) 0.6 - 1.3 mg/dL    GFRAA, POC >60 >60 ml/min/1.73m2    GFRNA, POC >60 >60 ml/min/1.73m2    Hematocrit (POC) 38 36.6 - 50.3 %    Comment Comment Not Indicated. Radiologic Studies -   CT NECK SOFT TISSUE W CONT           CT Results  (Last 48 hours)               06/17/18 2323  CT NECK SOFT TISSUE W CONT Preliminary result    Narrative:  PRELIMINARY REPORT       No drainable abscess. Asymmetric prominence of the left tonsils with   hyperdensity (image axial 33 measuring 3.8 x 1.9 x 2.8 cm (axial image 33 and   coronal image 51). Air in the tonsillar region may be postbiopsy in nature   and/or related to the esophagus (coronal image 60 and axial image 38). Pathology   and procedural reports not available at this time. Preliminary report was provided by Dr. Brook Do, the on-call radiologist, at 3046       Final report to follow. END PRELIMINARY REPORT                                           CXR Results  (Last 48 hours)    None            Medical Decision Making   I am the first provider for this patient. I reviewed the vital signs, available nursing notes, past medical history, past surgical history, family history and social history. Vital Signs-Reviewed the patient's vital signs.   Patient Vitals for the past 12 hrs:   Temp Pulse Resp BP SpO2   06/17/18 2125 99 °F (37.2 °C) (!) 120 22 (!) 169/118 99 %       Pulse Oximetry Analysis - 99% on RA    Cardiac Monitor:   Rate: 120 bpm  Rhythm: Sinus Tachycardia      Records Reviewed: Nursing Notes, Old Medical Records, Previous Radiology Studies and Previous Laboratory Studies    Provider Notes (Medical Decision Making):   DDx: throat mass, throat abscess    ED Course:   Initial assessment performed. The patients presenting problems have been discussed, and they are in agreement with the care plan formulated and outlined with them. I have encouraged them to ask questions as they arise throughout their visit. Critical Care Time:   0 minutes    Disposition:  Discharge Note:  12:43 AM  The pt is ready for discharge. The pt's signs, symptoms, diagnosis, and discharge instructions have been discussed and pt has conveyed their understanding. The pt is to follow up as recommended or return to ER should their symptoms worsen. Plan has been discussed and pt is in agreement. PLAN:  1. Current Discharge Medication List        2. Follow-up Information     None        Return to ED if worse     Diagnosis     Clinical Impression:   1. Tonsillar mass        Attestations: This note is prepared by Filipe Rodriguez. Pj Anderson, acting as Scribe for Nawaf Marinelli. Leonardo Cordon MD.    Nawaf Marinelli. Leonardo Cordon MD: The scribe's documentation has been prepared under my direction and personally reviewed by me in its entirety. I confirm that the note above accurately reflects all work, treatment, procedures, and medical decision making performed by me.

## 2018-06-18 NOTE — DISCHARGE INSTRUCTIONS
Tonsillitis: Care Instructions  Your Care Instructions    Tonsillitis is an infection of the tonsils that is caused by bacteria or a virus. The tonsils are in the back of the throat and are part of the immune system. Tonsillitis typically lasts from a few days up to a couple of weeks. Tonsillitis caused by a virus goes away on its own. Tonsillitis caused by the bacteria that causes strep throat is treated with antibiotics. You and your doctor may consider surgery to remove the tonsils (tonsillectomy) if you have serious complications or repeat infections. Follow-up care is a key part of your treatment and safety. Be sure to make and go to all appointments, and call your doctor if you are having problems. It's also a good idea to know your test results and keep a list of the medicines you take. How can you care for yourself at home? · If your doctor prescribed antibiotics, take them as directed. Do not stop taking them just because you feel better. You need to take the full course of antibiotics. · Gargle with warm salt water. This helps reduce swelling and relieve discomfort. Gargle once an hour with 1 teaspoon of salt mixed in 8 fluid ounces of warm water. · Take an over-the-counter pain medicine, such as acetaminophen (Tylenol), ibuprofen (Advil, Motrin), or naproxen (Aleve). Be safe with medicines. Read and follow all instructions on the label. No one younger than 20 should take aspirin. It has been linked to Reye syndrome, a serious illness. · Be careful when taking over-the-counter cold or flu medicines and Tylenol at the same time. Many of these medicines have acetaminophen, which is Tylenol. Read the labels to make sure that you are not taking more than the recommended dose. Too much acetaminophen (Tylenol) can be harmful. · Try an over-the-counter throat spray to relieve throat pain. · Drink plenty of fluids. Fluids may help soothe an irritated throat.  Drink warm or cool liquids (whichever feels better). These include tea, soup, and juice. · Do not smoke, and avoid secondhand smoke. Smoking can make tonsillitis worse. If you need help quitting, talk to your doctor about stop-smoking programs and medicines. These can increase your chances of quitting for good. · Use a vaporizer or humidifier to add moisture to your bedroom. Follow the directions for cleaning the machine. When should you call for help? Call your doctor now or seek immediate medical care if:  ? · Your pain gets worse on one side of your throat. ? · You have a new or higher fever. ? · You notice changes in your voice. ? · You have trouble opening your mouth. ? · You have any trouble breathing. ? · You have much more trouble swallowing. ? · You have a fever with a stiff neck or a severe headache. ? · You are sensitive to light or feel very sleepy or confused. ? Watch closely for changes in your health, and be sure to contact your doctor if:  ? · You do not get better after 2 days. Where can you learn more? Go to http://venu-sarah.info/. Enter G944 in the search box to learn more about \"Tonsillitis: Care Instructions. \"  Current as of: May 12, 2017  Content Version: 11.4  © 1052-2006 Healthwise, Incorporated. Care instructions adapted under license by [employer] (which disclaims liability or warranty for this information). If you have questions about a medical condition or this instruction, always ask your healthcare professional. [place] [address] any warranty or liability for your use of this information.

## 2018-06-18 NOTE — ED NOTES
Pt in ED w/ complaint of sore throat w/ swelling & difficulty swallowing X 2 months. Pt states he had a [facility] on Th[facility] w/ results planned for tomorrow. Pt is A&O X 4 and appears in no distress. Emergency Department Nursing Plan of Care       The Nursing Plan of Care is developed from the Nursing assessment and Emergency Department Attending provider initial evaluation. The plan of care may be reviewed in the ED Provider note.     The Plan of Care was developed with the following considerations:   Patient / Family readiness to learn indicated by:verbalized understanding  Persons(s) to be included in education: patient and family  Barriers to Learning/Limitations:No    Signed     Dustin Barron RN    6/18/2018   12:10 AM

## 2018-07-19 ENCOUNTER — HOSPITAL ENCOUNTER (EMERGENCY)
Age: 43
Discharge: HOME | End: 2018-07-20
Attending: EMERGENCY MEDICINE
Payer: COMMERCIAL

## 2018-07-19 VITALS
HEART RATE: 110 BPM | BODY MASS INDEX: 46.43 KG/M2 | WEIGHT: 313.5 LBS | DIASTOLIC BLOOD PRESSURE: 75 MMHG | OXYGEN SATURATION: 95 % | HEIGHT: 69 IN | TEMPERATURE: 98.4 F | RESPIRATION RATE: 20 BRPM | SYSTOLIC BLOOD PRESSURE: 130 MMHG

## 2018-07-19 DIAGNOSIS — E86.0 DEHYDRATION: Primary | ICD-10-CM

## 2018-07-19 DIAGNOSIS — E87.6 HYPOKALEMIA: ICD-10-CM

## 2018-07-19 LAB
ANION GAP SERPL CALC-SCNC: 6 MMOL/L (ref 5–15)
BASOPHILS # BLD: 0 K/UL (ref 0–0.1)
BASOPHILS NFR BLD: 0 % (ref 0–1)
BUN SERPL-MCNC: 9 MG/DL (ref 6–20)
BUN/CREAT SERPL: 8 (ref 12–20)
CALCIUM SERPL-MCNC: 9.3 MG/DL (ref 8.5–10.1)
CHLORIDE SERPL-SCNC: 102 MMOL/L (ref 97–108)
CO2 SERPL-SCNC: 30 MMOL/L (ref 21–32)
CREAT SERPL-MCNC: 1.11 MG/DL (ref 0.7–1.3)
DIFFERENTIAL METHOD BLD: ABNORMAL
EOSINOPHIL # BLD: 0.2 K/UL (ref 0–0.4)
EOSINOPHIL NFR BLD: 2 % (ref 0–7)
ERYTHROCYTE [DISTWIDTH] IN BLOOD BY AUTOMATED COUNT: 12.7 % (ref 11.5–14.5)
GLUCOSE SERPL-MCNC: 99 MG/DL (ref 65–100)
HCT VFR BLD AUTO: 40.7 % (ref 36.6–50.3)
HGB BLD-MCNC: 14.1 G/DL (ref 12.1–17)
IMM GRANULOCYTES # BLD: 0.1 K/UL (ref 0–0.04)
IMM GRANULOCYTES NFR BLD AUTO: 1 % (ref 0–0.5)
LYMPHOCYTES # BLD: 2.4 K/UL (ref 0.8–3.5)
LYMPHOCYTES NFR BLD: 26 % (ref 12–49)
MCH RBC QN AUTO: 29.3 PG (ref 26–34)
MCHC RBC AUTO-ENTMCNC: 34.6 G/DL (ref 30–36.5)
MCV RBC AUTO: 84.4 FL (ref 80–99)
MONOCYTES # BLD: 0.9 K/UL (ref 0–1)
MONOCYTES NFR BLD: 9 % (ref 5–13)
NEUTS SEG # BLD: 5.7 K/UL (ref 1.8–8)
NEUTS SEG NFR BLD: 62 % (ref 32–75)
NRBC # BLD: 0 K/UL (ref 0–0.01)
NRBC BLD-RTO: 0 PER 100 WBC
PLATELET # BLD AUTO: 288 K/UL (ref 150–400)
PMV BLD AUTO: 9.1 FL (ref 8.9–12.9)
POTASSIUM SERPL-SCNC: 3.3 MMOL/L (ref 3.5–5.1)
RBC # BLD AUTO: 4.82 M/UL (ref 4.1–5.7)
SODIUM SERPL-SCNC: 138 MMOL/L (ref 136–145)
WBC # BLD AUTO: 9.2 K/UL (ref 4.1–11.1)

## 2018-07-19 PROCEDURE — 74011250637 HC RX REV CODE- 250/637: Performed by: EMERGENCY MEDICINE

## 2018-07-19 PROCEDURE — 80048 BASIC METABOLIC PNL TOTAL CA: CPT | Performed by: EMERGENCY MEDICINE

## 2018-07-19 PROCEDURE — 99283 EMERGENCY DEPT VISIT LOW MDM: CPT

## 2018-07-19 PROCEDURE — 36415 COLL VENOUS BLD VENIPUNCTURE: CPT | Performed by: EMERGENCY MEDICINE

## 2018-07-19 PROCEDURE — 96360 HYDRATION IV INFUSION INIT: CPT

## 2018-07-19 PROCEDURE — 96361 HYDRATE IV INFUSION ADD-ON: CPT

## 2018-07-19 PROCEDURE — 85025 COMPLETE CBC W/AUTO DIFF WBC: CPT | Performed by: EMERGENCY MEDICINE

## 2018-07-19 PROCEDURE — 74011250636 HC RX REV CODE- 250/636: Performed by: EMERGENCY MEDICINE

## 2018-07-19 RX ORDER — POTASSIUM CHLORIDE 1.5 G/1.77G
40 POWDER, FOR SOLUTION ORAL
Status: COMPLETED | OUTPATIENT
Start: 2018-07-19 | End: 2018-07-19

## 2018-07-19 RX ADMIN — SODIUM CHLORIDE 1000 ML: 900 INJECTION, SOLUTION INTRAVENOUS at 23:42

## 2018-07-19 RX ADMIN — SODIUM CHLORIDE 1000 ML: 900 INJECTION, SOLUTION INTRAVENOUS at 22:25

## 2018-07-19 RX ADMIN — POTASSIUM CHLORIDE 40 MEQ: 1.5 POWDER, FOR SOLUTION ORAL at 23:18

## 2018-07-20 NOTE — DISCHARGE INSTRUCTIONS
Dehydration: Care Instructions  Your Care Instructions  Dehydration happens when your body loses too much fluid. This might happen when you do not drink enough water or you lose large amounts of fluids from your body because of diarrhea, vomiting, or sweating. Severe dehydration can be life-threatening. Water and minerals called electrolytes help put your body fluids back in balance. Learn the early signs of fluid loss, and drink more fluids to prevent dehydration. Follow-up care is a key part of your treatment and safety. Be sure to make and go to all appointments, and call your doctor if you are having problems. It's also a good idea to know your test results and keep a list of the medicines you take. How can you care for yourself at home? · To prevent dehydration, drink plenty of fluids, enough so that your urine is light yellow or clear like water. Choose water and other caffeine-free clear liquids until you feel better. If you have kidney, heart, or liver disease and have to limit fluids, talk with your doctor before you increase the amount of fluids you drink. · If you do not feel like eating or drinking, try taking small sips of water, sports drinks, or other rehydration drinks. · Get plenty of rest.  To prevent dehydration  · Add more fluids to your diet and daily routine, unless your doctor has told you not to. · During hot weather, drink more fluids. Drink even more fluids if you exercise a lot. Stay away from drinks with alcohol or caffeine. · Watch for the symptoms of dehydration. These include:  ¨ A dry, sticky mouth. ¨ Dark yellow urine, and not much of it. ¨ Dry and sunken eyes. ¨ Feeling very tired. · Learn what problems can lead to dehydration. These include:  ¨ Diarrhea, fever, and vomiting. ¨ Any illness with a fever, such as pneumonia or the flu. ¨ Activities that cause heavy sweating, such as endurance races and heavy outdoor work in hot or humid weather.   ¨ Alcohol or drug abuse or withdrawal.  ¨ Certain medicines, such as cold and allergy pills (antihistamines), diet pills (diuretics), and laxatives. ¨ Certain diseases, such as diabetes, cancer, and heart or kidney disease. When should you call for help? Call 911 anytime you think you may need emergency care. For example, call if:    · You passed out (lost consciousness).    Call your doctor now or seek immediate medical care if:    · You are confused and cannot think clearly.     · You are dizzy or lightheaded, or you feel like you may faint.     · You have signs of needing more fluids. You have sunken eyes and a dry mouth, and you pass only a little dark urine.     · You cannot keep fluids down.    Watch closely for changes in your health, and be sure to contact your doctor if:    · You are not making tears.     · Your skin is very dry and sags slowly back into place after you pinch it.     · Your mouth and eyes are very dry. Where can you learn more? Go to http://venu-sarah.info/. Enter R804 in the search box to learn more about \"Dehydration: Care Instructions. \"  Current as of: November 20, 2017  Content Version: 11.7  © 0830-9338 [employer]. Care instructions adapted under license by [employer] (which disclaims liability or warranty for this information). If you have questions about a medical condition or this instruction, always ask your healthcare professional. [place] [address] any warranty or liability for your use of this information. Oral Rehydration: Care Instructions  Your Care Instructions  Dehydration occurs when your body loses too much water. This can happen if you do not drink enough fluids or lose a lot of fluid due to diarrhea, vomiting, or sweating. Being dehydrated can cause health problems and can even be life-threatening. To replace lost fluids, you need to drink liquid that contains special chemicals called electrolytes. Electrolytes keep your body working well. Plain water does not have electrolytes. You also need to rest to prevent more fluid loss. Replacing water and electrolytes (oral rehydration) completely takes about 36 hours. But you should feel better within a few hours. Follow-up care is a key part of your treatment and safety. Be sure to make and go to all appointments, and call your doctor if you are having problems. It's also a good idea to know your test results and keep a list of the medicines you take. How can you care for yourself at home? · Take frequent sips of a drink such as Gatorade, Powerade, or other rehydration drinks that your doctor suggests. These replace both fluid and important chemicals (electrolytes) you need for balance in your blood. · Drink 2 quarts of cool liquid over 2 to 4 hours. You should have at least 10 glasses of liquid a day to replace lost fluid. If you have kidney, heart, or liver disease and have to limit fluids, talk with your doctor before you increase the amount of fluids you drink. · Make your own drink. Measure everything carefully. The drink may not work well or may even be harmful if the amounts are off. ¨ 1 quart water  ¨ ½ teaspoon salt  ¨ 6 teaspoons sugar  · Do not drink liquid with caffeine, such as coffee and tiffanie. · Do not drink any alcohol. It can make you dehydrated. · Drink plenty of fluids, enough so that your urine is light yellow or clear like water. If you have kidney, heart, or liver disease and have to limit fluids, talk with your doctor before you increase the amount of fluids you drink. When should you call for help? Call 911 anytime you think you may need emergency care. For example, call if:    · You have signs of severe dehydration, such as:  ¨ You are confused or unable to stay awake. ¨ You passed out (lost consciousness).    Call your doctor now or seek immediate medical care if:    · You still have signs of dehydration.  You have sunken eyes and a dry mouth, and you pass only a little dark urine.     · You are dizzy or lightheaded, or you feel like you may faint.     · You are not able to keep down fluids.    Watch closely for changes in your health, and be sure to contact your doctor if:    · You do not get better as expected. Where can you learn more? Go to http://venu-sarah.info/. Enter I040 in the search box to learn more about \"Oral Rehydration: Care Instructions. \"  Current as of: November 20, 2017  Content Version: 11.7  © 7588-1228 [employer]. Care instructions adapted under license by [employer] (which disclaims liability or warranty for this information). If you have questions about a medical condition or this instruction, always ask your healthcare professional. [address] any warranty or liability for your use of this informat       Hypokalemia: Care Instructions  Your Care Instructions    Hypokalemia (say \"rs-qo-nfi-KIMBERLY-chari-uh\") is a low level of potassium. The heart, muscles, kidneys, and nervous system all need potassium to work well. This problem has many different causes. Kidney problems, diet, and medicines like diuretics and laxatives can cause it. So can vomiting or diarrhea. In some cases, cancer is the cause. Your doctor may do tests to find the cause of your low potassium levels. You may need medicines to bring your potassium levels back to normal. You may also need regular blood tests to check your potassium. If you have very low potassium, you may need intravenous (IV) medicines. You also may need tests to check the electrical activity of your heart. Heart problems caused by low potassium levels can be very serious. Follow-up care is a key part of your treatment and safety. Be sure to make and go to all appointments, and call your doctor if you are having problems.  It's also a good idea to know your test results and keep a list of the medicines you take.  How can you care for yourself at home? · If your doctor recommends it, eat foods that have a lot of potassium. These include fresh fruits, juices, and vegetables. They also include nuts, beans, and milk. · Be safe with medicines. If your doctor prescribes medicines or potassium supplements, take them exactly as directed. Call your doctor if you have any problems with your medicines. · Get your potassium levels tested as often as your doctor tells you. When should you call for help? Call 911 anytime you think you may need emergency care. For example, call if:    · You feel like your heart is missing beats. Heart problems caused by low potassium can cause death.     · You passed out (lost consciousness).     · You have a seizure.    Call your doctor now or seek immediate medical care if:    · You feel weak or unusually tired.     · You have severe arm or leg cramps.     · You have tingling or numbness.     · You feel sick to your stomach, or you vomit.     · You have belly cramps.     · You feel bloated or constipated.     · You have to urinate a lot.     · You feel very thirsty most of the time.     · You are dizzy or lightheaded, or you feel like you may faint.     · You feel depressed, or you lose touch with reality.    Watch closely for changes in your health, and be sure to contact your doctor if:    · You do not get better as expected. Where can you learn more? Go to http://venu-sarah.info/. Enter G358 in the search box to learn more about \"Hypokalemia: Care Instructions. \"  Current as of: May 12, 2017  Content Version: 11.7  © 6242-8217 Healthwise, Incorporated. Care instructions adapted under license by [employer] (which disclaims liability or warranty for this information).  If you have questions about a medical condition or this instruction, always ask your healthcare professional. [address] any warranty or liability for your use of this information.  ion.

## 2018-07-20 NOTE — ED NOTES
Pt in ED w/ complaint of sore throat and generalized weakness X 1 day. Pt is A&O X 4 and appears in no distress. Emergency Department Nursing Plan of Care       The Nursing Plan of Care is developed from the Nursing assessment and Emergency Department Attending provider initial evaluation. The plan of care may be reviewed in the ED Provider note.     The Plan of Care was developed with the following considerations:   Patient / Family readiness to learn indicated by:verbalized understanding  Persons(s) to be included in education: patient  Barriers to Learning/Limitations:No    Signed     Viviana Bruno RN    7/20/2018   12:40 AM

## 2018-07-20 NOTE — ED PROVIDER NOTES
EMERGENCY DEPARTMENT HISTORY AND PHYSICAL EXAM      Date: 7/19/2018  Patient Name: Kavya Camp    History of Presenting Illness     Chief Complaint   Patient presents with    Sore Throat     Pt with c/o sore throat with difficulty swallowing water and urinating due to Stage 3 throat cancer. Pt feels that he is dehydrated.  Aphagia       History Provided By: Patient and Patient's Wife    HPI: Kavya Camp, 43 y.o. male, presents ambulatory to the ED with cc of decreased urine output throughout today. Pt states he was recently diagnosed with Stage 3 throat cancer, and is in the process of starting treatment. He notes prior to starting Radiation and chemotherapy, he still has to have 3 more teeth pulled and a feeding tube placed (scheduled for 7/24). Pt notes he had a port placed last week for fluids and treatment, noting he last had fluids x6 days ago. He states that since awaking this morning he has felt dehydrated secondary to lack of PO fluid in take. The pt reports shooting pain with swallowing because of the throat cancer, keeping him from drinking fluids. Pt reports only urinating x2 times today and note sit was slow to come out. Pt reports taking Percocet at home that manages his throat pain. He is currently on a soft food diet. Pt specifically denies any fever, chills, cough, congestion, shortness of breath, chest pain, abdominal pain, nausea, vomiting, diarrhea, dysuria, or urinary frequency. PMHx: Significant for Tonsillar cancer  PSHx: Significant for hernia repair  Social Hx: -tobacco, -EtOH, -Illicit Drugs     There are no other complaints, changes, or physical findings at this time. PCP: Day Tomlin MD    Current Outpatient Prescriptions   Medication Sig Dispense Refill    oxyCODONE-acetaminophen (PERCOCET) 5-325 mg per tablet Take 1 Tab by mouth every four (4) hours as needed for Pain. Max Daily Amount: 6 Tabs. 6 Tab 0    PREDNISONE PO Take  by mouth.       oxyCODONE-acetaminophen (PERCOCET) 5-325 mg per tablet Take 1 Tab by mouth every four (4) hours as needed for Pain. Max Daily Amount: 6 Tabs. 6 Tab 0    diphenhydrAMINE (BENADRYL) 25 mg capsule Take 25 mg by mouth every six (6) hours as needed. Past History     Past Medical History:  Past Medical History:   Diagnosis Date    Tonsillar abscess 05/2018    Tonsillar cancer [facility])        Past Surgical History:  Past Surgical History:   Procedure Laterality Date    HX HERNIA REPAIR         Family History:  History reviewed. No pertinent family history. Social History:  Social History   Substance Use Topics    Smoking status: Current Every Day Smoker     Packs/day: 0.50    Smokeless tobacco: Never Used    Alcohol use 1.0 oz/week     2 Cans of beer per week      Comment: social       Allergies: Allergies   Allergen Reactions    Nsaids (Non-Steroidal Anti-Inflammatory Drug) Swelling    Aspirin Angioedema    Ibuprofen Angioedema    Naproxen Angioedema         Review of Systems   Review of Systems   Constitutional: Negative for activity change, appetite change, chills, fever and unexpected weight change. HENT: Negative for congestion. Pain with swallowing    Eyes: Negative for pain and visual disturbance. Respiratory: Negative for cough and shortness of breath. Cardiovascular: Negative for chest pain. Gastrointestinal: Negative for abdominal pain, diarrhea, nausea and vomiting. Genitourinary: Positive for decreased urine volume. Negative for dysuria. Musculoskeletal: Negative for back pain. Skin: Negative for rash. Neurological: Negative for headaches. Physical Exam   Physical Exam   Constitutional: He is oriented to person, place, and time. He appears well-developed and well-nourished. Obese male in mild distress   HENT:   Head: Normocephalic and atraumatic.    Post pharyngeal exam deferred due to trismus   Eyes: Conjunctivae and EOM are normal. Pupils are equal, round, and reactive to light. Right eye exhibits no discharge. Left eye exhibits no discharge. Neck: Normal range of motion. Neck supple. Cardiovascular: Regular rhythm and normal heart sounds. Tachycardia present. No murmur heard. Pulmonary/Chest: Effort normal and breath sounds normal. No respiratory distress. He has no wheezes. He has no rales. Abdominal: Soft. Bowel sounds are normal. He exhibits no distension. There is no tenderness. Musculoskeletal: Normal range of motion. He exhibits no edema. Neurological: He is alert and oriented to person, place, and time. No cranial nerve deficit. He exhibits normal muscle tone. Skin: Skin is warm and dry. No rash noted. He is not diaphoretic. Port site clean, dry and intact with dermabond in place    Nursing note and vitals reviewed. Diagnostic Study Results     Labs -     Recent Results (from the past 12 hour(s))   CBC WITH AUTOMATED DIFF    Collection Time: 07/19/18 10:30 PM   Result Value Ref Range    WBC 9.2 4.1 - 11.1 K/uL    RBC 4.82 4.10 - 5.70 M/uL    HGB 14.1 12.1 - 17.0 g/dL    HCT 40.7 36.6 - 50.3 %    MCV 84.4 80.0 - 99.0 FL    MCH 29.3 26.0 - 34.0 PG    MCHC 34.6 30.0 - 36.5 g/dL    RDW 12.7 11.5 - 14.5 %    PLATELET 430 682 - 069 K/uL    MPV 9.1 8.9 - 12.9 FL    NRBC 0.0 0  WBC    ABSOLUTE NRBC 0.00 0.00 - 0.01 K/uL    NEUTROPHILS 62 32 - 75 %    LYMPHOCYTES 26 12 - 49 %    MONOCYTES 9 5 - 13 %    EOSINOPHILS 2 0 - 7 %    BASOPHILS 0 0 - 1 %    IMMATURE GRANULOCYTES 1 (H) 0.0 - 0.5 %    ABS. NEUTROPHILS 5.7 1.8 - 8.0 K/UL    ABS. LYMPHOCYTES 2.4 0.8 - 3.5 K/UL    ABS. MONOCYTES 0.9 0.0 - 1.0 K/UL    ABS. EOSINOPHILS 0.2 0.0 - 0.4 K/UL    ABS. BASOPHILS 0.0 0.0 - 0.1 K/UL    ABS. IMM.  GRANS. 0.1 (H) 0.00 - 0.04 K/UL    DF AUTOMATED     METABOLIC PANEL, BASIC    Collection Time: 07/19/18 10:30 PM   Result Value Ref Range    Sodium 138 136 - 145 mmol/L    Potassium 3.3 (L) 3.5 - 5.1 mmol/L    Chloride 102 97 - 108 mmol/L    CO2 30 21 - 32 mmol/L    Anion gap 6 5 - 15 mmol/L    Glucose 99 65 - 100 mg/dL    BUN 9 6 - 20 MG/DL    Creatinine 1.11 0.70 - 1.30 MG/DL    BUN/Creatinine ratio 8 (L) 12 - 20      GFR est AA >60 >60 ml/min/1.73m2    GFR est non-AA >60 >60 ml/min/1.73m2    Calcium 9.3 8.5 - 10.1 MG/DL         Medical Decision Making   I am the first provider for this patient. I reviewed the vital signs, available nursing notes, past medical history, past surgical history, family history and social history. Vital Signs-Reviewed the patient's vital signs. Patient Vitals for the past 12 hrs:   Temp Pulse Resp BP SpO2   07/19/18 2057 98.4 °F (36.9 °C) (!) 110 20 130/75 95 %       Pulse Oximetry Analysis - 95% on RA    Cardiac Monitor:   Rate: 110 bpm  Rhythm: Sinus Tachycardia      Records Reviewed: Nursing Notes and Old Medical Records    Provider Notes (Medical Decision Making):   Decreased oral hydration, awaiting G tube placement. Will initiate fluids. ED Course:   Initial assessment performed. The patients presenting problems have been discussed, and they are in agreement with the care plan formulated and outlined with them. I have encouraged them to ask questions as they arise throughout their visit. Progress note:  11:21 PM  Pt noted to be feeling better, ready for discharge. Updated pt and/or family on all final lab findings. Will follow up as instructed. All questions have been answered, pt voiced understanding and agreement with plan. Specific return precautions provided as well as instructions to return to the ED should sx worsen at any time. Vital signs stable for discharge. Written by So Larios ED Scribe, as dictated by Chemo Tejeda MD      Critical Care Time:   none    Disposition:  Discharge Note:  11:22 PM  The pt is ready for discharge. The pt's signs, symptoms, diagnosis, and discharge instructions have been discussed and pt has conveyed their understanding.  The pt is to follow up as recommended or return to ER should their symptoms worsen. Plan has been discussed and pt is in agreement. PLAN:  1. Current Discharge Medication List        2. Follow-up Information     Follow up With Details Comments Contact Info    [facility] EMERGENCY DEPT  If symptoms worsen [address]  [phone]        Return to ED if worse     Diagnosis     Clinical Impression:   1. Dehydration    2. Hypokalemia        Attestations: This note is prepared by So Larios, acting as Scribe for Chemo Tejeda MD      The scribe's documentation has been prepared under my direction and personally reviewed by me in its entirety. I confirm that the note above accurately reflects all work, treatment, procedures, and medical decision making performed by me. Chemo Tejeda MD      This note will not be viewable in [address]

## 2018-08-29 ENCOUNTER — HOSPITAL ENCOUNTER (EMERGENCY)
Age: 43
Discharge: HOME | End: 2018-08-29
Attending: EMERGENCY MEDICINE
Payer: COMMERCIAL

## 2018-08-29 VITALS
HEIGHT: 69 IN | HEART RATE: 105 BPM | OXYGEN SATURATION: 96 % | WEIGHT: 286.5 LBS | DIASTOLIC BLOOD PRESSURE: 78 MMHG | RESPIRATION RATE: 20 BRPM | BODY MASS INDEX: 42.43 KG/M2 | SYSTOLIC BLOOD PRESSURE: 127 MMHG | TEMPERATURE: 97.4 F

## 2018-08-29 DIAGNOSIS — K94.20 COMPLICATION OF FEEDING TUBE (HCC): Primary | ICD-10-CM

## 2018-08-29 PROCEDURE — 77030034696 HC CATH URETH FOL 2W BARD -A

## 2018-08-29 PROCEDURE — 99282 EMERGENCY DEPT VISIT SF MDM: CPT

## 2018-08-29 PROCEDURE — 75810000109 HC GASTRO TUBE CHANGE

## 2018-08-29 RX ORDER — GABAPENTIN 300 MG/1
300 CAPSULE ORAL 3 TIMES DAILY
COMMUNITY
End: 2018-09-25

## 2018-08-29 NOTE — ED PROVIDER NOTES
EMERGENCY DEPARTMENT HISTORY AND PHYSICAL EXAM      Date: 8/29/2018  Patient Name: Shania Jiménez    History of Presenting Illness     Chief Complaint   Patient presents with    Feeding Tube Problem       History Provided By: Patient and Family Member    HPI: Shania Jiménez, 43 y.o. male with PMHx significant for tonsillar abscess / tonsillar CA, presents ambulatory accompanied by family member to the ED with cc of sudden onset of constant issue with his chronic indwelling feeding tube x 30 minutes. Pt reports that the feeding tube fell out 30 minutes ago. He denies any acute injury or trauma to the area. Pt denies any associated symptoms. He denies any exacerbating or alleviating factors. Pt's family member reports that the feeding tube was placed on 8/3/18. Pt denies any complications with the feeding tube prior to today. He specifically denies nausea, vomiting, fever, chills, CP, or SOB. There are no other complaints, changes, or physical findings at this time. PCP: Basim Harrison MD    Current Outpatient Prescriptions   Medication Sig Dispense Refill    gabapentin (NEURONTIN) 300 mg capsule Take 300 mg by mouth three (3) times daily.  oxyCODONE-acetaminophen (PERCOCET) 5-325 mg per tablet Take 1 Tab by mouth every four (4) hours as needed for Pain. Max Daily Amount: 6 Tabs. 6 Tab 0    diphenhydrAMINE (BENADRYL) 25 mg capsule Take 25 mg by mouth every six (6) hours as needed. Past History     Past Medical History:  Past Medical History:   Diagnosis Date    Tonsillar abscess 05/2018    Tonsillar cancer [facility])        Past Surgical History:  Past Surgical History:   Procedure Laterality Date    HX HERNIA REPAIR         Family History:  History reviewed. No pertinent family history. Social History:  Social History   Substance Use Topics    Smoking status: Former Smoker     Packs/day: 0.50    Smokeless tobacco: Never Used    Alcohol use No       Allergies:   Allergies   Allergen Reactions    Nsaids (Non-Steroidal Anti-Inflammatory Drug) Swelling    Aspirin Angioedema    Ibuprofen Angioedema    Naproxen Angioedema         Review of Systems   Review of Systems   Constitutional: Negative. Negative for fever. HENT: Negative. Negative for drooling, facial swelling and trouble swallowing. Eyes: Negative. Negative for discharge and redness. Respiratory: Negative. Negative for chest tightness, shortness of breath and wheezing. Cardiovascular: Negative. Negative for chest pain. Gastrointestinal: Negative for abdominal distention, abdominal pain, constipation, diarrhea, nausea and vomiting. Positive for feeding tube complication   Endocrine: Negative. Genitourinary: Negative. Negative for difficulty urinating and dysuria. Musculoskeletal: Negative. Negative for arthralgias and myalgias. Skin: Negative. Negative for color change and rash. Allergic/Immunologic: Negative. Neurological: Negative. Negative for syncope, facial asymmetry and speech difficulty. Hematological: Negative. Psychiatric/Behavioral: Negative. Negative for agitation and confusion. All other systems reviewed and are negative. Physical Exam   Physical Exam   Constitutional: He is oriented to person, place, and time. He appears well-developed and well-nourished. HENT:   Head: Normocephalic and atraumatic. Eyes: Conjunctivae and EOM are normal.   Neck: Neck supple. Cardiovascular: Normal rate, regular rhythm and intact distal pulses. Pulmonary/Chest: No accessory muscle usage. No respiratory distress. Abdominal: Soft. Normal appearance. There is no tenderness. Feeding tube tract clean and dry with no bleeding or purulence    Musculoskeletal: Normal range of motion. Neurological: He is alert and oriented to person, place, and time. Skin: Skin is warm and dry. Psychiatric: He has a normal mood and affect.  His behavior is normal. Thought content normal.   Nursing note and vitals reviewed. Diagnostic Study Results     Labs -   No results found for this or any previous visit (from the past 12 hour(s)). Radiologic Studies -   No orders to display     CT Results  (Last 48 hours)    None        CXR Results  (Last 48 hours)    None            Medical Decision Making   I am the first provider for this patient. I reviewed the vital signs, available nursing notes, past medical history, past surgical history, family history and social history. Vital Signs-Reviewed the patient's vital signs. Patient Vitals for the past 12 hrs:   Temp Pulse Resp BP SpO2   08/29/18 0103 97.4 °F (36.3 °C) (!) 105 20 127/78 96 %       Pulse Oximetry Analysis - 96% on RA    Records Reviewed: Nursing Notes, Old Medical Records, Previous Radiology Studies and Previous Laboratory Studies    Provider Notes (Medical Decision Making):   DDx feeding tube displacement    ED Course:   Initial assessment performed. The patients presenting problems have been discussed, and they are in agreement with the care plan formulated and outlined with them. I have encouraged them to ask questions as they arise throughout their visit. Medications - No data to display    Procedure Note - Feeding Tube Placement  1:18 AM   Performed by: Lindon Meigs, MD  20 German heredia tube was re-inserted into pt's ABD. Estimated blood loss: none  The procedure took 1-15 minutes, and pt tolerated well. Critical Care Time:   0    Disposition:  DISCHARGE NOTE  1:50 AM  The patient has been re-evaluated and is ready for discharge. Reviewed available results with patient. Counseled pt on diagnosis and care plan. Pt has expressed understanding, and all questions have been answered. Pt agrees with plan and agrees to F/U as recommended, or return to the ED if their sxs worsen. Discharge instructions have been provided and explained to the pt, along with reasons to return to the ED.   Written by Greer Moraes ED Scribviry, as dictated by Leah Kim MD.    PLAN:  1. Discharge Medication List as of 8/29/2018  1:50 AM        2. Follow-up Information     Follow up With Details Comments Contact Info    Your gastroenterologist           Return to ED if worse     Diagnosis     Clinical Impression:   1. Complication of feeding tube [facility])        Attestations: This note is prepared by Amy Vazquez, acting as Scribe for Leah Kim MD.    The scribe's documentation has been prepared under my direction and personally reviewed by me in its entirety. I confirm that the note above accurately reflects all work, treatment, procedures, and medical decision making performed by me.   Leah Kim MD

## 2018-08-29 NOTE — ED NOTES
Pt presents ambulatory to ED complaining of feeding tube fell out. Pt [facility] Pt states tube fell out about 10 mins ago. No odor, bleeding, hematoma, or drainage noted to site. MD at bedside, has replaced feeding tube. Dressing changed, pt tolerated wound care with no immediate complications. Pt is alert and oriented x 4, RR even and unlabored, skin is warm and dry. Assesment completed and pt updated on plan of care. Emergency Department Nursing Plan of Care       The Nursing Plan of Care is developed from the Nursing assessment and Emergency Department Attending provider initial evaluation. The plan of care may be reviewed in the ED Provider note.     The Plan of Care was developed with the following considerations:   Patient / Family readiness to learn indicated by:verbalized understanding  Persons(s) to be included in education: patient  Barriers to Learning/Limitations:Tess [address]    8/29/2018   1:11 AM

## 2018-08-29 NOTE — ED NOTES
Discharge instructions were given to the patient by Naomi Wu.     The patient left the Emergency Department ambulatory, alert and oriented and in no acute distress with 0 prescriptions. The patient was encouraged to call or return to the ED for worsening issues or problems and was encouraged to schedule a follow up appointment for continuing care. The patient verbalized understanding of discharge instructions and prescriptions, all questions were answered. The patient has no further concerns at this time.

## 2018-08-29 NOTE — DISCHARGE INSTRUCTIONS
YOU HAD A PEREZ CATHETER INSERTED TO PRESERVE THE FEEDING TUBE TRACT. FOLLOW-UP WITH GASTROENTEROLOGY TOMORROW FOR FEEDING TUBE REPLACEMENT. Learning About Living With a Feeding Tube  What is tube feeding? Your body needs nutrition to stay strong and help you live a healthy life. If you're unable to eat, or if you have an illness that makes it hard to swallow food, you may need a feeding tube. The tube is placed in the stomach and is used to give food, liquids, and medicines. Depending on why you need a feeding tube, you may have it for several weeks or months or longer. When you first get a feeding tube, your biggest challenge may be your new relationship with food. For many people, eating and savoring food is one of the most pleasing parts of daily life. You may grieve the loss of the daily habit of eating and the social aspects of sharing food with others. If you've struggled to get enough nutrition-if it's been hard to eat or swallow-having a feeding tube can help you regain your health and strength. And understanding how a feeding tube works is a first step toward dealing with changes that come with having the tube. It can also help you avoid common problems that can occur. What can you expect when you have a feeding tube? After surgery to insert a feeding tube, you'll have a 6- to 12-inch tube coming out of your belly. The tube is about the same width as a pen. There are different ways the tube can be used for feeding. Your doctor will help you decide which is best for you and how often feedings should occur. · A feeding syringe. This is most common. A syringe is connected to the tube. A nutritional mixture (formula) is put into the syringe and flows into the tube and your stomach. This is called bolus feeding. · A gravity bag. Formula is placed into a special bag that is hung on a hook or a pole. The height and weight of the bag make the food flow down the tube and into your stomach.   · A bag and pump. A pump is used to push formula from a bag through the tube. This is also called continuous feeding. How do you use a feeding tube? It's important that the food you use for tube feeding have the right blend of nutrients for you. And the food needs to be the correct thickness so the tube doesn't clog. For most people, a milk shake-type of formula works best for tube feeding. Your doctor or dietitian will help you find the right formula to use. Each time you use the tube for feeding:  · Make sure that the tube-feeding formula is at room temperature. · Wash your hands before you handle the tube and formula. Wash the top of the can of formula before you open it. · Follow your doctor's instructions for how much formula to use for each feeding. ¨ If using a feeding syringe: Connect the syringe to the tube, and put the formula into the syringe. Hold the syringe up high so the formula flows into the tube. Use the plunger on the syringe to gently push any remaining formula into the tube. ¨ If using a gravity bag: Connect the bag to the tube, and add the formula to the bag. Hang the bag on a hook or pole about 18 inches above the stomach. Depending on the type of formula, the food may take a few hours to flow through the tube. Ask your doctor what you can expect and how long it should take. ¨ If using a bag and pump, follow the instructions that come with the pump. · Sit up or keep your head up during the feeding and for 30 minutes after. · If you feel sick to your stomach or have stomach cramps during the feeding, slow the rate that the formula comes through the tube. Then slowly increase the rate as you can manage it. · Keep the formula in the refrigerator after you open it. Don't let the formula sit at room temperature for more than 8 hours. Throw away any open cans of food after 24 hours, even if they have been refrigerated.   · Talk with your doctor about changing your feedings or medicines if you are having problems with diarrhea, constipation, or vomiting. How do you care for a feeding tube? · Keep it clean. That's the most important thing you need to know about caring for your tube. Flush the tube with warm water before and after feedings or giving medicines. You can use a syringe to push water through the tube. Clean the end (opening) of the tube every day with an antiseptic wipe. · Always wash your hands before touching the tube. · Tape the tube to your body so the end is facing up. Look for medical tape in your local drugstore. It may irritate your skin less than other types of tape. Change the position of the tape every few days. · Clamp the tube when you're not using it. Put the clamp close to your body so that food and liquids don't run down the tube. · Keep the skin around the tube clean and dry. How do you avoid problems with a feeding tube? · Blocked tube. A blocked tube can happen when the tube isn't flushed or when formula or medicines are too thick. ¨ Prevent blockage by flushing the tube with warm water before and after using the tube. ¨ If the tube is blocked, try to clear it by flushing the tube. Call your doctor if the tube won't clear. ¨ Don't use a wire or anything else to try to unclog a tube. A wire can poke a hole in the tube. · Tube falls out. Don't try to put the tube back in by yourself. Call your doctor right away. The tube needs to be replaced before the opening in your belly closes, which can happen within hours. · Leaking tube. A tube that leaks may be blocked, or it may not fit right. After checking the tube and flushing it to make sure that the tube isn't blocked, call your doctor. Where can you learn more? Go to http://venu-sarah.info/. Enter F416 in the search box to learn more about \"Learning About Living With a Feeding Tube. \"  Current as of: May 12, 2017  Content Version: 11.7  © 5182-0096 [employer], [employer].  Care instructions adapted under license by [employer] (which disclaims liability or warranty for this information). If you have questions about a medical condition or this instruction, always ask your healthcare professional. [address] any warranty or liability for your use of this information.

## 2018-09-10 ENCOUNTER — HOSPITAL ENCOUNTER (INPATIENT)
Age: 43
LOS: 15 days | Discharge: ANOTHER HOSPITAL | DRG: 683 | End: 2018-09-25
Attending: INTERNAL MEDICINE | Admitting: INTERNAL MEDICINE
Payer: COMMERCIAL

## 2018-09-10 DIAGNOSIS — R11.0 NAUSEA: ICD-10-CM

## 2018-09-10 DIAGNOSIS — C09.1 MALIGNANT NEOPLASM OF TONSILLAR PILLARS (ANTERIOR) (POSTERIOR) (HCC): Primary | ICD-10-CM

## 2018-09-10 DIAGNOSIS — G44.59 OTHER COMPLICATED HEADACHE SYNDROME: ICD-10-CM

## 2018-09-10 DIAGNOSIS — R07.0 THROAT PAIN: ICD-10-CM

## 2018-09-10 DIAGNOSIS — R68.84 JAW PAIN: ICD-10-CM

## 2018-09-10 DIAGNOSIS — R13.10 DYSPHAGIA, UNSPECIFIED TYPE: ICD-10-CM

## 2018-09-10 PROBLEM — R11.2 INTRACTABLE NAUSEA AND VOMITING: Status: ACTIVE | Noted: 2018-09-10

## 2018-09-10 PROBLEM — B37.0 CANDIDIASIS OF MOUTH: Chronic | Status: ACTIVE | Noted: 2018-09-10

## 2018-09-10 PROBLEM — C05.1 MALIGNANT NEOPLASM OF SOFT PALATE (HCC): Chronic | Status: ACTIVE | Noted: 2018-09-10

## 2018-09-10 LAB
ALBUMIN SERPL-MCNC: 3.2 G/DL (ref 3.5–5)
ALBUMIN/GLOB SERPL: 0.8 {RATIO} (ref 1.1–2.2)
ALP SERPL-CCNC: 61 U/L (ref 45–117)
ALT SERPL-CCNC: 103 U/L (ref 12–78)
ANION GAP SERPL CALC-SCNC: 13 MMOL/L (ref 5–15)
AST SERPL-CCNC: 41 U/L (ref 15–37)
BASOPHILS # BLD: 0 K/UL (ref 0–0.1)
BASOPHILS NFR BLD: 0 % (ref 0–1)
BILIRUB SERPL-MCNC: 0.4 MG/DL (ref 0.2–1)
BUN SERPL-MCNC: 46 MG/DL (ref 6–20)
BUN/CREAT SERPL: 18 (ref 12–20)
CALCIUM SERPL-MCNC: 7.8 MG/DL (ref 8.5–10.1)
CHLORIDE SERPL-SCNC: 94 MMOL/L (ref 97–108)
CO2 SERPL-SCNC: 25 MMOL/L (ref 21–32)
CREAT SERPL-MCNC: 2.6 MG/DL (ref 0.7–1.3)
DIFFERENTIAL METHOD BLD: ABNORMAL
EOSINOPHIL # BLD: 0.1 K/UL (ref 0–0.4)
EOSINOPHIL NFR BLD: 2 % (ref 0–7)
ERYTHROCYTE [DISTWIDTH] IN BLOOD BY AUTOMATED COUNT: 12.8 % (ref 11.5–14.5)
GLOBULIN SER CALC-MCNC: 3.9 G/DL (ref 2–4)
GLUCOSE SERPL-MCNC: 83 MG/DL (ref 65–100)
HCT VFR BLD AUTO: 31.1 % (ref 36.6–50.3)
HGB BLD-MCNC: 10.8 G/DL (ref 12.1–17)
IMM GRANULOCYTES # BLD: 0 K/UL (ref 0–0.04)
IMM GRANULOCYTES NFR BLD AUTO: 0 % (ref 0–0.5)
LYMPHOCYTES # BLD: 0.2 K/UL (ref 0.8–3.5)
LYMPHOCYTES NFR BLD: 8 % (ref 12–49)
MAGNESIUM SERPL-MCNC: 1.6 MG/DL (ref 1.6–2.4)
MCH RBC QN AUTO: 28.6 PG (ref 26–34)
MCHC RBC AUTO-ENTMCNC: 34.7 G/DL (ref 30–36.5)
MCV RBC AUTO: 82.3 FL (ref 80–99)
MONOCYTES # BLD: 0.5 K/UL (ref 0–1)
MONOCYTES NFR BLD: 18 % (ref 5–13)
NEUTS SEG # BLD: 1.7 K/UL (ref 1.8–8)
NEUTS SEG NFR BLD: 72 % (ref 32–75)
NRBC # BLD: 0 K/UL (ref 0–0.01)
NRBC BLD-RTO: 0 PER 100 WBC
PLATELET # BLD AUTO: 168 K/UL (ref 150–400)
PMV BLD AUTO: 9.5 FL (ref 8.9–12.9)
POTASSIUM SERPL-SCNC: 3.1 MMOL/L (ref 3.5–5.1)
PROT SERPL-MCNC: 7.1 G/DL (ref 6.4–8.2)
RBC # BLD AUTO: 3.78 M/UL (ref 4.1–5.7)
RBC MORPH BLD: ABNORMAL
SODIUM SERPL-SCNC: 132 MMOL/L (ref 136–145)
TOTAL CELLS COUNTED SPEC: 50
WBC # BLD AUTO: 2.5 K/UL (ref 4.1–11.1)

## 2018-09-10 PROCEDURE — 97116 GAIT TRAINING THERAPY: CPT

## 2018-09-10 PROCEDURE — 83735 ASSAY OF MAGNESIUM: CPT | Performed by: INTERNAL MEDICINE

## 2018-09-10 PROCEDURE — 97161 PT EVAL LOW COMPLEX 20 MIN: CPT

## 2018-09-10 PROCEDURE — 74011250636 HC RX REV CODE- 250/636: Performed by: INTERNAL MEDICINE

## 2018-09-10 PROCEDURE — 36415 COLL VENOUS BLD VENIPUNCTURE: CPT | Performed by: INTERNAL MEDICINE

## 2018-09-10 PROCEDURE — 92610 EVALUATE SWALLOWING FUNCTION: CPT | Performed by: SPEECH-LANGUAGE PATHOLOGIST

## 2018-09-10 PROCEDURE — C9113 INJ PANTOPRAZOLE SODIUM, VIA: HCPCS | Performed by: INTERNAL MEDICINE

## 2018-09-10 PROCEDURE — 74011250637 HC RX REV CODE- 250/637: Performed by: INTERNAL MEDICINE

## 2018-09-10 PROCEDURE — 80053 COMPREHEN METABOLIC PANEL: CPT | Performed by: INTERNAL MEDICINE

## 2018-09-10 PROCEDURE — 65270000015 HC RM PRIVATE ONCOLOGY

## 2018-09-10 PROCEDURE — 85025 COMPLETE CBC W/AUTO DIFF WBC: CPT | Performed by: INTERNAL MEDICINE

## 2018-09-10 PROCEDURE — 74011000250 HC RX REV CODE- 250: Performed by: INTERNAL MEDICINE

## 2018-09-10 RX ORDER — FENTANYL 100 UG/H
1 PATCH TRANSDERMAL
Status: DISCONTINUED | OUTPATIENT
Start: 2018-09-10 | End: 2018-09-16

## 2018-09-10 RX ORDER — NYSTATIN 100000 [USP'U]/ML
500000 SUSPENSION ORAL 4 TIMES DAILY
Status: DISCONTINUED | OUTPATIENT
Start: 2018-09-10 | End: 2018-09-25 | Stop reason: HOSPADM

## 2018-09-10 RX ORDER — ENOXAPARIN SODIUM 100 MG/ML
40 INJECTION SUBCUTANEOUS EVERY 12 HOURS
Status: DISCONTINUED | OUTPATIENT
Start: 2018-09-10 | End: 2018-09-14

## 2018-09-10 RX ORDER — ONDANSETRON 2 MG/ML
4 INJECTION INTRAMUSCULAR; INTRAVENOUS
Status: DISCONTINUED | OUTPATIENT
Start: 2018-09-10 | End: 2018-09-11

## 2018-09-10 RX ORDER — SODIUM CHLORIDE 9 MG/ML
75 INJECTION, SOLUTION INTRAVENOUS CONTINUOUS
Status: DISCONTINUED | OUTPATIENT
Start: 2018-09-10 | End: 2018-09-25 | Stop reason: HOSPADM

## 2018-09-10 RX ORDER — GABAPENTIN 300 MG/1
300 CAPSULE ORAL 3 TIMES DAILY
Status: DISCONTINUED | OUTPATIENT
Start: 2018-09-10 | End: 2018-09-12

## 2018-09-10 RX ORDER — POTASSIUM CHLORIDE 7.45 MG/ML
10 INJECTION INTRAVENOUS
Status: COMPLETED | OUTPATIENT
Start: 2018-09-10 | End: 2018-09-11

## 2018-09-10 RX ORDER — MORPHINE SULFATE 10 MG/ML
2 INJECTION, SOLUTION INTRAMUSCULAR; INTRAVENOUS
Status: DISCONTINUED | OUTPATIENT
Start: 2018-09-10 | End: 2018-09-11

## 2018-09-10 RX ORDER — OXYCODONE HYDROCHLORIDE 5 MG/1
15 TABLET ORAL
Status: DISCONTINUED | OUTPATIENT
Start: 2018-09-10 | End: 2018-09-10

## 2018-09-10 RX ORDER — OXYCODONE HYDROCHLORIDE 5 MG/1
15 TABLET ORAL
Status: DISCONTINUED | OUTPATIENT
Start: 2018-09-10 | End: 2018-09-20

## 2018-09-10 RX ORDER — PANTOPRAZOLE SODIUM 40 MG/10ML
40 INJECTION, POWDER, LYOPHILIZED, FOR SOLUTION INTRAVENOUS EVERY 24 HOURS
Status: DISCONTINUED | OUTPATIENT
Start: 2018-09-10 | End: 2018-09-25 | Stop reason: HOSPADM

## 2018-09-10 RX ORDER — LORAZEPAM 2 MG/ML
1 INJECTION INTRAMUSCULAR
Status: DISCONTINUED | OUTPATIENT
Start: 2018-09-10 | End: 2018-09-25 | Stop reason: HOSPADM

## 2018-09-10 RX ORDER — OXYCODONE HYDROCHLORIDE 5 MG/1
10 TABLET ORAL
Status: DISCONTINUED | OUTPATIENT
Start: 2018-09-10 | End: 2018-09-10

## 2018-09-10 RX ORDER — OXYCODONE HYDROCHLORIDE 5 MG/1
10 TABLET ORAL
Status: DISCONTINUED | OUTPATIENT
Start: 2018-09-10 | End: 2018-09-20

## 2018-09-10 RX ORDER — ENOXAPARIN SODIUM 100 MG/ML
40 INJECTION SUBCUTANEOUS EVERY 24 HOURS
Status: DISCONTINUED | OUTPATIENT
Start: 2018-09-10 | End: 2018-09-10

## 2018-09-10 RX ORDER — POLYETHYLENE GLYCOL 3350 17 G/17G
17 POWDER, FOR SOLUTION ORAL DAILY
Status: DISCONTINUED | OUTPATIENT
Start: 2018-09-11 | End: 2018-09-25 | Stop reason: HOSPADM

## 2018-09-10 RX ADMIN — ENOXAPARIN SODIUM 40 MG: 40 INJECTION, SOLUTION INTRAVENOUS; SUBCUTANEOUS at 22:32

## 2018-09-10 RX ADMIN — SODIUM CHLORIDE 100 ML/HR: 900 INJECTION, SOLUTION INTRAVENOUS at 15:03

## 2018-09-10 RX ADMIN — MORPHINE SULFATE 2 MG: 10 INJECTION INTRAVENOUS at 21:00

## 2018-09-10 RX ADMIN — NYSTATIN 500000 UNITS: 100000 SUSPENSION ORAL at 23:14

## 2018-09-10 RX ADMIN — GABAPENTIN 300 MG: 300 CAPSULE ORAL at 15:03

## 2018-09-10 RX ADMIN — FAMOTIDINE 20 MG: 10 INJECTION, SOLUTION INTRAVENOUS at 15:25

## 2018-09-10 RX ADMIN — NYSTATIN 500000 UNITS: 100000 SUSPENSION ORAL at 18:31

## 2018-09-10 RX ADMIN — PANTOPRAZOLE SODIUM 40 MG: 40 INJECTION, POWDER, FOR SOLUTION INTRAVENOUS at 18:32

## 2018-09-10 RX ADMIN — ONDANSETRON 4 MG: 2 INJECTION INTRAMUSCULAR; INTRAVENOUS at 22:31

## 2018-09-10 RX ADMIN — POTASSIUM CHLORIDE 10 MEQ: 10 INJECTION, SOLUTION INTRAVENOUS at 18:32

## 2018-09-10 RX ADMIN — OXYCODONE HYDROCHLORIDE 10 MG: 5 TABLET ORAL at 22:31

## 2018-09-10 RX ADMIN — GABAPENTIN 300 MG: 300 CAPSULE ORAL at 23:01

## 2018-09-10 RX ADMIN — POTASSIUM CHLORIDE 10 MEQ: 10 INJECTION, SOLUTION INTRAVENOUS at 23:50

## 2018-09-10 RX ADMIN — POTASSIUM CHLORIDE 10 MEQ: 10 INJECTION, SOLUTION INTRAVENOUS at 22:34

## 2018-09-10 RX ADMIN — OXYCODONE HYDROCHLORIDE 15 MG: 5 TABLET ORAL at 15:04

## 2018-09-10 RX ADMIN — ONDANSETRON 4 MG: 2 INJECTION INTRAMUSCULAR; INTRAVENOUS at 15:25

## 2018-09-10 NOTE — PROGRESS NOTES
Problem: Mobility Impaired (Adult and Pediatric)  Goal: *Acute Goals and Plan of Care (Insert Text)  Physical Therapy Goals  Initiated 9/10/2018  1. Patient will move from supine to sit and sit to supine  in bed with independence within 7 day(s). 2.  Patient will transfer from bed to chair and chair to bed with independence using the least restrictive device within 7 day(s). 3.  Patient will perform sit to stand with independence within 7 day(s). 4.  Patient will ambulate with independence for 500 feet with the least restrictive device within 7 day(s). 5.  Patient will ascend/descend 3 stairs with 1 handrail(s) with supervision/set-up within 7 day(s). physical Therapy EVALUATION  Patient: Moises Junior (43 y.o. male)  Date: 9/10/2018  Primary Diagnosis: nausea, vomiting, dehydration        Precautions: Peg tube       ASSESSMENT :  Based on the objective data described below, the patient presents with limited mobility, endurance and general strength due to recent few days of N&V related to chemoradiation etc. Patient lives with his girlfriend and her children in one story house. PTA pt reports that he is generally independent but has help from girlfriend and mother when needed with peg tube/dressing etc.  Pt has a PEG tube and is unable to drink water due to fear of vomiting, thus dehydration issues. Generally pts balance is good, though it is clear that he is weak. After getting OOB with CGA/S only pt ambulated 250 ft in hallway first with HHA, and then with RW which proved to be safer and provided more confidence for patient. Patient had scissoring prior to use of walker. Pts vitals were stable =- he did have 3 episodes where he had to stop to spit up mucous into a paper towel. Patient used BR after ambulation, using toiliet and washed hands with S only. Patient is pleasant and cooperative.   He does have difficulty speaking due to his CA of his tonsils, however he is understandable and willing to work with therapy. Doubt HHPT will be needed at [place]. Patient will benefit from skilled intervention to address the above impairments. Patients rehabilitation potential is considered to be Good  Factors which may influence rehabilitation potential include:   []         None noted  []         Mental ability/status  [x]         Medical condition  []         Home/family situation and support systems  []         Safety awareness  []         Pain tolerance/management  []         Other:      PLAN :  Recommendations and Planned Interventions:  []           Bed Mobility Training             []    Neuromuscular Re-Education  [x]           Transfer Training                   []    Orthotic/Prosthetic Training  [x]           Gait Training                         []    Modalities  [x]           Therapeutic Exercises           []    Edema Management/Control  [x]           Therapeutic Activities            [x]    Patient and Family Training/Education  []           Other (comment):    Frequency/Duration: Patient will be followed by physical therapy  3 times a week to address goals. Discharge Recommendations: None  Further Equipment Recommendations for Discharge: none, pt using walker now but anticipate that he will not need RW at [place] after strength returns     SUBJECTIVE:   Patient stated I do everything myself.     OBJECTIVE DATA SUMMARY:   HISTORY:    Past Medical History:   Diagnosis Date    Tonsillar abscess 05/2018    Tonsillar cancer [facility])      Past Surgical History:   Procedure Laterality Date    HX HERNIA REPAIR       Prior Level of Function/Home Situation: lives with girlfriend, generally independent  Personal factors and/or comorbidities impacting plan of care: CA, PEG tube, chemo radiation    Home Situation  Home Environment: Private residence  One/Two Story Residence: One story  Living Alone: No  Support Systems: Family member(s), Friends \ neighbors  Patient Expects to be Discharged to[employer] residence  Current DME Used/Available at Home: Other (comment) (feeding tube machine)    EXAMINATION/PRESENTATION/DECISION MAKING:   Critical Behavior:  Neurologic State: Alert  Orientation Level: Appropriate for age        Hearing: Auditory  Auditory Impairment: None  Skin:  intact  Edema:   Range Of Motion:  Within functional limits all extremities                          Strength:     generally within functional limits                    Tone & Sensation:    normal                              Coordination:   within functional limits  Vision:      Functional Mobility:  Bed Mobility:     Supine to Sit: Supervision  Sit to Supine: Modified independent     Transfers:  Sit to Stand: Supervision  Stand to Sit: Independent                       Balance:   Sitting: Intact  Standing: Impaired  Standing - Static: Good  Standing - Dynamic : Fair  Ambulation/Gait Training:  Distance (ft): 250 Feet (ft)  Assistive Device: Gait belt;Walker, rolling  Ambulation - Level of Assistance: Contact guard assistance        Gait Abnormalities: Decreased step clearance;Scissoring  Right Side Weight Bearing: Full  Left Side Weight Bearing: Full  Base of Support: Widened     Speed/Elaine: Pace decreased (<100 feet/min); Slow                        Stairs: Therapeutic Exercises:       Functional Measure:    Elder Mobility Scale    17/20         EMS and G-code impairment scale:  Percentage of impairment CH  0% CI  1-19% CJ  20-39% CK  40-59% CL  60-79% CM  80-99% CN  100%   EMS Score 0-20 20 17-19 13-16 9-12 5-8 1-4 0      Scores under 10 - generally these patients are dependent in mobility maneuvers; require help with  basic ADL, such as transfers, toileting and dressing. Scores between 10 - 13 - generally these patients are borderline in terms of safe mobility and  independence in ADL i.e. they require some help with some mobility maneuvers.     Scores over 14 - Generally these patients are able to perform mobility maneuvers alone and safely  and are independent in basic ADL. G codes: In compliance with CMSs Claims Based Outcome Reporting, the following G-code set was chosen for this patient based on their primary functional limitation being treated: The outcome measure chosen to determine the severity of the functional limitation was the MEMS with a score of 17/20 which was correlated with the impairment scale. ? Mobility - Walking and Moving Around:     - CURRENT STATUS: CI - 1% - 19% impaired, limited or restricted    - GOAL STATUS: CH - 0% impaired, limited or restricted    - D/C STATUS:  ---------------To be determined---------------      Physical Therapy Evaluation Charge Determination   History Examination Presentation Decision-Making   MEDIUM  Complexity : 1-2 comorbidities / personal factors will impact the outcome/ POC  LOW Complexity : 1-2 Standardized tests and measures addressing body structure, function, activity limitation and / or participation in recreation  LOW Complexity : Stable, uncomplicated  LOW Complexity : FOTO score of       Based on the above components, the patient evaluation is determined to be of the following complexity level: LOW     Pain:  Pain Scale 1: Visual  Pain Intensity 1: 0  Pain Location 1: Mouth        Pain Intervention(s) 1: Medication (see MAR)  Activity Tolerance:   Good, vitals stable  Please refer to the flowsheet for vital signs taken during this treatment. After treatment:   []         Patient left in no apparent distress sitting up in chair  [x]         Patient left in no apparent distress in bed  [x]         Call bell left within reach  [x]         Nursing notified  []         Caregiver present  []         Bed alarm activated    COMMUNICATION/EDUCATION:   The patients plan of care was discussed with: Registered Nurse.  []         Fall prevention education was provided and the patient/caregiver indicated understanding.   [x] Patient/family have participated as able in goal setting and plan of care. []         Patient/family agree to work toward stated goals and plan of care. []         Patient understands intent and goals of therapy, but is neutral about his/her participation. []         Patient is unable to participate in goal setting and plan of care.     Thank you for this referral.  Quan Thurston, PT   Time Calculation: 33 mins

## 2018-09-10 NOTE — H&P
[address] HISTORY AND PHYSICAL Arben Snyder 
MR#: [id number] : 1975 ACCOUNT #: [de-identified] ADMIT DATE: 09/10/2018 ADMISSION DIAGNOSES: 
1.  Dehydration. 2.  Chemo induced nausea and vomiting. 3.  Mucositis. HISTORY OF PRESENT ILLNESS:  The patient is a 51-year-old  gentleman who presents to clinic after reporting a 2-day history of intractable nausea and vomiting and inability to take anything by mouth or via PEG tube. He has a history of squamous cell carcinoma of the left tonsil, p16 positive and is currently undergoing treatment with concurrent chemoradiation. His last dose of cisplatin was on  which was a second dose. He states that over the weekend on Saturday, he started having vomiting that was reported to be very painful. He does have mucositis as well. He states that he had Compazine at home, but was not able to keep this down. He did not try to take anything by mouth. He does have a PEG tube in place, but states he was scared to use the PEG tube for anything since he was so scared of having emesis. He is now in clinic. We have given him a unit of fluids and he is very anxious and nervous about going home since he is worried that he will start vomiting again. He denies any fevers or chills, or constipation. His pain has been difficult to control throughout this process and continues to do so. PAST MEDICAL HISTORY:  Tobacco abuse, seasonal allergies. PAST SURGICAL HISTORY:  PEG tube placement in 2018. FAMILY HISTORY:  His brother  at age 35 and another brother  at age 25 but no known family history of malignancy. SOCIAL HISTORY:  He lives at home with his girlfriend and her 2 children, ages 6 and 15. He has 4 children of his own who do not live with him. He smoked 1 pack per day for 24 years, but quit tobacco upon diagnosis. 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  He is also a  at [employer]. He lives about 20 miles from our office. REVIEW OF SYSTEMS:   
CONSTITUTIONAL:  Positive for fatigue. HEENT:  Denies headache or vision changes, but positive for painful swallow. CARDIOVASCULAR:  Denies any palpitations. RESPIRATORY:  Denies any shortness of breath. ABDOMEN:  A PEG tube in place. GASTROINTESTINAL:  Denies any abdominal pain, constipation or diarrhea. EXTREMITIES:  Denies any swelling. SKIN:  Denies any rash. NEUROLOGIC:  Denies any weakness or numbness. HEMATOLOGIC:  Denies any new lumps or bumps. PHYSICAL EXAMINATION: 
VITAL SIGNS:  In clinic weight 279 pounds, pulse 107, respirations 18, blood pressure 112/78, oxygen saturation 97% on room air. GENERAL:  He is an obese gentleman seen in the treatment room in mild distress and crying. HEENT:  He has a mucositis present in the posterior oropharynx. Dry mucous membranes. Pupils are equal, reactive to light. HEMATOLOGIC:  Fullness in the left cervical chain with skin pigment changes. RESPIRATORY:  Lungs are clear to auscultation bilaterally. No wheezes or rhonchi. CARDIOVASCULAR:  Regular rate and rhythm. No murmurs, rubs or gallops. CHEST:  Right port in place that is well healed. ABDOMEN:  PEG is in place. Abdomen is obese with redundant tissue and is soft. No hepatosplenomegaly appreciated. Exam difficult. BACK:  No tenderness to palpation. MUSCULOSKELETAL:  No tenderness or swelling of the joints. EXTREMITIES:   No clubbing, cyanosis or edema. SKIN:  No rash. NEUROLOGIC:  Cranial nerves II-XII are grossly intact. LABORATORY DATA:  CBC from office shows a white blood cell count of 2.5, hemoglobin 11.9, hematocrit 33%, platelets 281, ANC 8187. ASSESSMENT AND PLAN:  The patient is a very pleasant 60-year-old gentleman who is being admitted directly from clinic for nausea, vomiting, dehydration and mucositis. 1.  Dehydration:  His BMP here in clinic is still pending.   We certainly will check one when he goes to the hospital as he certainly could have acute kidney injury given the fact that he has had very little fluid intake for the past 2-1/2 days. He has gotten 1 liter of normal saline here in clinic. We will continue hydrating him with normal saline as an inpatient. He will need home health upon discharge since he has a very low medical IQ. He was not trying to do anything per his PEG tube at home and he will need to feel comfortable using his PEG tube and putting fluids through it prior to discharge. 2.  Nausea and vomiting:  He is on day 27 of cisplatin his last dose was 09/05. He states he has been nauseated since Saturday and has not really taken any medication at home for fear that it would make him throw up. We have given him Kytril here in clinic and Zantac. We will continue antiemetics as an inpatient and we will start with IV antiemetics and hopefully can transition him to p.o. once he is able to swallow his pills, can also put pills through the PEG tube as well. Again, once his nausea is improved, we can start nutrition through his PEG tube. We will assure IV PPI daily. 3.  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 I suspect he will likely not be able to get his third and final dose of cisplatin, but we will certainly follow him and see. 4.  Radiation-induced mucositis: We will ensure that that we order a Klack's solution for his mucositis and continue pain control. 5.  Cancer related pain:  He has had very severe pain since diagnosis that we have been trying to manage on the outpatient basis. His mucositis is certainly making his pain worse. He currently has been on fentanyl 100 mg patch, which we will continue. He was on oxycodone p.r.n. We will change p.r.n. IV pain medication. We will continue to follow him closely. Total time spent was over 45 minutes with more than 50% of time spent face-to-face counseling. MD BLADIMIR Mas/NATALIYA 
D: 09/10/2018 12:52    
T: 09/10/2018 14:00 
JOB #: [id number]

## 2018-09-10 NOTE — PROGRESS NOTES
Initial Nutrition Assessment:    INTERVENTIONS/RECOMMENDATIONS:   · Consider diet advancement to at least full liquids  · Initiate TF when medically feasible, can use bolus setting on pump or syringe  · Bolus 240 ml of TwoCal q 4 hrs until goal volume of 1200 ml per day is reached (5 boluses total). 60 ml water flush before and after bolus  · Provides: 2400 kcal, 100 g pro and 1440 ml free water  · Encourage PO water as well  · Meets 92% and 95% of estimated kcal and protein needs respectively. He can meet the rest through PO intake   · Standing weight when able    ASSESSMENT:   Chart reviewed, medically noted for tonsillar cancer with Chemo induced nausea and vomiting. Nutrition was consulted for management of tube feeding. Pt reports he usually is only able to tolerate one can (240 ml) of TwoCal daily because it makes him feel full. He does not have a pump at home and boluses with 60 ml syringe. He was told he needed to administer a total of 7 cans per day (3360 kcal). This sound excessive due to he still takes food PO. He is afraid to start TF at this time due to vomiting which irritates his throat. TF recs shown above. He has experienced severe weight loss of 8% body weight x 2 months. Past Medical History:   Diagnosis Date    Tonsillar abscess 05/2018    Tonsillar cancer [facility])        Diet Order: Clear liquids  % Eaten:  No data found.     Pertinent Medications: [x]Reviewed: NS, pepcid,   Pertinent Labs: [x]Reviewed:   Food Allergies: [x]NKFA  []Other   Last BM:   Edema:        []RUE   []LUE   []RLE   []LLE      Pressure Injury:      [] Stage I   [] Stage II   [] Stage III   [] Stage IV      Wt Readings from Last 30 Encounters:   08/29/18 130 kg (286 lb 8 oz)   07/19/18 142.2 kg (313 lb 8 oz)   06/17/18 155.6 kg (343 lb)   05/30/18 155.6 kg (343 lb)   04/24/18 151 kg (333 lb)   04/02/18 152 kg (335 lb)   07/19/16 150.6 kg (332 lb)   05/05/16 105.5 kg (232 lb 9.6 oz)   04/04/15 122.5 kg (270 lb)   07/16/13 140.6 kg (310 lb)   10/04/12 146.9 kg (323 lb 12.8 oz)   09/25/12 141.5 kg (312 lb 0.4 oz)   09/05/12 144.7 kg (319 lb)   08/03/12 143.8 kg (317 lb)   05/30/12 122.5 kg (270 lb)   01/30/12 121.1 kg (267 lb)   11/07/11 122.5 kg (270 lb)   02/01/11 113.4 kg (250 lb)   11/04/10 127 kg (280 lb)       Anthropometrics:   Height:   Weight:     IBW (%IBW):   ( ) UBW (%UBW):   (  %)   Last Weight Metrics:  Weight Loss Metrics 8/29/2018 7/19/2018 6/17/2018 5/30/2018 4/24/2018 4/2/2018 7/19/2016   Today's Wt 286 lb 8 oz 313 lb 8 oz 343 lb 343 lb 333 lb 335 lb 332 lb   BMI 42.31 kg/m2 46.3 kg/m2 50.65 kg/m2 50.65 kg/m2 49.18 kg/m2 49.47 kg/m2 49.01 kg/m2       BMI: There is no height or weight on file to calculate BMI. This BMI is indicative of:   []Underweight    []Normal    []Overweight    [] Obesity   [x] Extreme Obesity (BMI>40)     Estimated Nutrition Needs (Based on):   2487 Kcals/day (MSJL: 2180 x 1.2) , 105 g (0.8 g/kg) Protein  Carbohydrate: At Least 130 g/day  Fluids: 2615 mL/day (1ml/kcal) or per primary team    NUTRITION DIAGNOSES:   Problem:  Inadequate protein-energy intake      Etiology: related to Chemo induced nausea and vomiting. Signs/Symptoms: as evidenced by 8% wt loss x 2 months      NUTRITION INTERVENTIONS:  Meals/Snacks: General/healthful diet   Supplements: Commercial supplement              GOAL:   initiate TF in 24-48 hrs    LEARNING NEEDS (Diet, Food/Nutrient-Drug Interaction):    [x] None Identified   [] Identified and Education Provided/Documented   [] Identified and Pt declined/was not appropriate     Cultureal, Islam, OR Ethnic Dietary Needs:    [x] None Identified   [] Identified and Addressed     [x] Interdisciplinary Care Plan Reviewed/Documented    [x] Discharge Planning: TwoCal, 5 cans per day with additional PO intake      MONITORING /EVALUATION:      Food/Nutrient Intake Outcomes:  Total energy intake, Enteral/parenteral nutrition intake  Physical Signs/Symptoms Outcomes: Weight/weight change, Electrolyte and renal profile, GI, Glucose profile    NUTRITION RISK:    [x] High              [] Moderate           []  Low  []  Minimal/Uncompromised    PT SEEN FOR:    [x]  MD Consult: []Calorie Count      []Diabetic Diet Education        []Diet Education     []Electrolyte Management     []General Nutrition Management and Supplements     [x]Management of Tube Feeding     []TPN Recommendations    [x]  RN Referral:  []MST score >=2     [x]Enteral/Parenteral Nutrition PTA     []Pregnant: Gestational DM or Multigestation     []Pressure Ulcer/Wound Care needs        []  Low BMI  []  LOS Referral       Sherryle Poncho, RDN  Pager [phone]  Weekend Pager [phone]

## 2018-09-10 NOTE — IP AVS SNAPSHOT
[address] [facility] 
[phone] Patient: Fredo Salgado MRN: [id number] PGD:68/9/0203 You are allergic to the following Allergen Reactions Nsaids (Non-Steroidal Anti-Inflammatory Drug) Swelling Aspirin Angioedema Ibuprofen Angioedema Naproxen Angioedema Immunizations Administered for This Admission Name Date Influenza Vaccine (Quad) PF 9/17/2018 Recent Documentation Height Weight BMI Smoking Status 1.753 m 123.7 kg 40.27 kg/m2 Former Smoker Emergency Contacts  (Rel.) Home Phone Work Phone Mobile Phone Bhupinder Bourgeois (Other Relative) [phone] -- [phone] About your hospitalization You were admitted on:  September 10, 2018 You last received care in the:  [address] You were discharged on:  September 25, 2018 Why you were hospitalized Your primary diagnosis was: Intractable Nausea And Vomiting Your diagnoses also included:  Esophageal Reflux, Malignant Neoplasm Of Tonsillar Pillars (Anterior) (Posterior) (Hcc) Providers Seen During Your Hospitalization Provider Specialty Primary office phone Nikunj York MD Hematology and Oncology [phone] Your Primary Care Physician (PCP) Primary Care Physician Office Phone Office Fax [address] [facility], [address] [place] [address] [phone] Follow-up Information Follow up With Details Comments Contact Info Vignesh Green MD Schedule an appointment as soon as possible for a visit  [address] [facility],[address] [place] [phone] [facility] -   This is your home health care provider. If you dont hear from them within 48hrs of discharge, please give them a call [address] [facility] [facility] [address] [phone] Prakash De La Garza MD Schedule an appointment as soon as possible for a visit 3-5 DAYS [address] [facility] 
[phone] Dariela Grubbs MD  OFFICE WILL CALL IN 1-2 DAYS  [address] [facility] 
[phone] My Medications STOP taking these medications diphenhydrAMINE 25 mg capsule Commonly known as:  BENADRYL  
   
  
 gabapentin 300 mg capsule Commonly known as:  NEURONTIN  
   
  
 oxyCODONE-acetaminophen 5-325 mg per tablet Commonly known as:  PERCOCET TAKE these medications as instructed Instructions Each Dose to Equal  
 Morning Noon Evening Bedtime  
 aluminum-magnesium hydroxide 200-200 mg/5 mL susp 30 mL, diphenhydrAMINE 12.5 mg/5 mL elix 75 mg, lidocaine 2 % soln 30 mL, sucralfate 100 mg/mL susp 3 g oral solution (compounded) Your last dose was: Your next dose is: Take 5 mL by mouth four (4) times daily. 5 mL  
    
   
   
   
  
 fentaNYL 100 mcg/hr PATCH Commonly known as:  Felix Tao Your last dose was: Your next dose is:    
   
   
 1 Patch by TransDERmal route every seventy-two (72) hours. Max Daily Amount: 1 Patch. Indications: SEVERE PAIN WITH OPIOID TOLERANCE  
 1 Patch  
    
   
   
   
  
 lidocaine 2 % solution Commonly known as:  XYLOCAINE Your last dose was: Your next dose is: Take 15 mL by mouth as needed for Pain. 15 mL  
    
   
   
   
  
 nystatin 100,000 unit/mL suspension Commonly known as:  MYCOSTATIN Your last dose was: Your next dose is: Take 5 mL by mouth four (4) times daily. swish and spit  Indications: oral candidiasis 301801 Units  
    
   
   
   
  
 oxyCODONE 20 mg/mL concentrated solution Commonly known as:  Forrestine Arch Your last dose was: Your next dose is: 1 mL by Per G Tube route every four (4) hours as needed. Max Daily Amount: 120 mg. Indications: SEVERE PAIN WITH OPIOID TOLERANCE  
 20 mg  
    
   
   
   
  
 polyethylene glycol 17 gram packet Commonly known as:  Palak Clause Your last dose was: Your next dose is:    
   
   
 1 Packet by Per G Tube route daily. 17 g Where to Get Your Medications Information on where to get these meds will be given to you by the nurse or doctor. ! Ask your nurse or doctor about these medications  
  aluminum-magnesium hydroxide 200-200 mg/5 mL susp 30 mL, diphenhydrAMINE 12.5 mg/5 mL elix 75 mg, lidocaine 2 % soln 30 mL, sucralfate 100 mg/mL susp 3 g oral solution (compounded)  
 fentaNYL 100 mcg/hr PATCH  
 lidocaine 2 % solution  
 nystatin 100,000 unit/mL suspension  
 oxyCODONE 20 mg/mL concentrated solution  
 polyethylene glycol 17 gram packet Discharge Instructions None Discharge Orders None [employer] Announcement We are excited to announce that we are making your provider's discharge notes available to you in [employer]. You will see these notes when they are completed and signed by the physician that discharged you from your recent hospital stay. If you have any questions or concerns about any information you see in [employer], please call the Health Information Department where you were seen or reach out to your Primary Care Provider for more information about your plan of care. Introducing [facility]! [employer] introduces [employer] patient portal. Now you can access parts of your medical record, email your doctor's office, and request medication refills online. 1. In your internet browser, go to [email or web address] 2. Click on the First Time User? Click Here link in the Sign In box. You will see the New Member Sign Up page. 3. Enter your [employer] Access Code exactly as it appears below.  You will not need to use this code after youve completed the sign-up process. If you do not sign up before the expiration date, you must request a new code. · [employer] Access Code: [email or web address] Expires: 10/8/2018  1:14 PM 
 
4. Enter the last four digits of your Social Security Number (xxxx) and Date of Birth (mm/dd/yyyy) as indicated and click Submit. You will be taken to the next sign-up page. 5. Create a [employer] ID. This will be your [employer] login ID and cannot be changed, so think of one that is secure and easy to remember. 6. Create a [employer] password. You can change your password at any time. 7. Enter your Password Reset Question and Answer. This can be used at a later time if you forget your password. 8. Enter your e-mail address. You will receive e-mail notification when new information is available in [address] 9. Click Sign Up. You can now view and download portions of your medical record. 10. Click the Download Summary menu link to download a portable copy of your medical information. If you have questions, please visit the Frequently Asked Questions section of the [employer] website. Remember, [employer] is NOT to be used for urgent needs. For medical emergencies, dial 911. Now available from your iPhone and Android! General Information Please provide this summary of care documentation to your next provider. Patient Signature:  ____________________________________________________________ Date:  ____________________________________________________________  
  
Kimber Gonzales Provider Signature:  ____________________________________________________________ Date:  ____________________________________________________________

## 2018-09-10 NOTE — PROGRESS NOTES
Problem: Dysphagia (Adult)  Goal: *Speech Goal: (INSERT TEXT)  1. Patient will tolerate puree diet with thin liquids without signs/symptoms of aspiration given no cues within 7 day(s). Speech Language Pathology bedside swallow evaluation  Patient: Ginna Ramirez (43 y.o. male)  Date: 9/10/2018  Primary Diagnosis: nausea, vomiting, dehydration        Precautions: nausea       ASSESSMENT :  Based on the objective data described below, the patient presents with severe odynophagia s/p chemoradiation to tonsils as well as several days of vomiting which was aggravated tissues. He is able to swallow limited amounts of liquids with max pain. Educated patient on importance of maintaining PO even with PEG tube, after discharge to prevent disuse atrophy. Explained that he can defer this when vomiting. Patient would benefit from OP SLP tx fo r swallow exercises and more swallow tx after discharge, possibly MBS when he is back to baseline swallow. At this time nausea and pain are too severe to consider this. Patient will benefit from skilled intervention to address the above impairments. Patients rehabilitation potential is considered to be Good  Factors which may influence rehabilitation potential include:   [x]            None noted  []            Mental ability/status  []            Medical condition  []            Home/family situation and support systems  []            Safety awareness  []            Pain tolerance/management  []            Other:      PLAN :  Recommendations and Planned Interventions:  1. Clear liquid diet  2. Encourage PO when nausea improves  3. Further plans for swallow follow up once acute issues are resolved. Frequency/Duration: Patient will be followed by speech-language pathology 3 times a week to address goals. Discharge Recommendations: Outpatient     SUBJECTIVE:   Patient stated It hurt.     OBJECTIVE:     Past Medical History:   Diagnosis Date    Tonsillar abscess 05/2018    Tonsillar cancer [facility])      Past Surgical History:   Procedure Laterality Date    HX HERNIA REPAIR       Prior Level of Function/Home Situation:   Home Situation  Home Environment: Private residence  One/Two Story Residence: One story  Living Alone: No  Support Systems: Family member(s), Friends \ neighbors  Patient Expects to be Discharged to[de-identified] Private residence  Current DME Used/Available at Home: Other (comment) (feeding tube machine)  Diet prior to admission: eating a few milkshakes in days prior to admission  Current Diet:  Clear liqudis   Cognitive and Communication Status:  Neurologic State: Alert  Orientation Level: Appropriate for age     Perception: Appears intact  Perseveration: No perseveration noted     Oral Assessment:  Oral Assessment  Labial: Decreased seal  Dentition: Full; Intact  Oral Hygiene: clean, red palate  Mandible:  (difficulty with jaw opening, patient does this passsively )  P. O. Trials:  Patient Position: mostly upright in bed  Vocal quality prior to P.O.: Low volume  Consistency Presented:  (ice, frozen italian ice)  How Presented: Self-fed/presented     Bolus Acceptance:  (was able to open mouth for spoon, but would have had trouble opening father than this)        Propulsion: No impairment  Oral Residue: None  Initiation of Swallow: Delayed (# of seconds)  Laryngeal Elevation: Functional (SLP did not palpate 2/2 pain, patient indicated severe pain 10/10 with swallow and with (+) pain behaviors)  Aspiration Signs/Symptoms: None  Pharyngeal Phase Characteristics: Painful swallow (10/10 pain)             Oral Phase Severity: Mild  Pharyngeal Phase Severity : Moderate-severe (only able to swallow liquids and very soft foods)    NOMS:   The NOMS functional outcome measure was used to quantify this patient's level of swallowing impairment. Based on the NOMS, the patient was determined to be at level 3 for swallow function     G Codes:   In compliance with CMSs Claims Based Outcome Reporting, the following G-code set was chosen for this patient based the use of the NOMS functional outcome to quantify this patient's level of swallowing impairment. Using the NOMS, the patient was determined to be at level 3 for swallow function which correlates with the CL= 60-79% level of severity. Based on the objective assessment provided within this note, the current, goal, and discharge g-codes are as follows:    Swallow  Swallowing:   Swallow Current Status CL= 60-79%   Swallow Goal Status CL= 60-79%      NOMS Swallowing Levels:  Level 1 (CN): NPO  Level 2 (CM): NPO but takes consistency in therapy  Level 3 (CL): Takes less than 50% of nutrition p.o. and continues with nonoral feedings; and/or safe with mod cues; and/or max diet restriction  Level 4 (CK): Safe swallow but needs mod cues; and/or mod diet restriction; and/or still requires some nonoral feeding/supplements  Level 5 (CJ): Safe swallow with min diet restriction; and/or needs min cues  Level 6 (CI): Independent with p.o.; rare cues; usually self cues; may need to avoid some foods or needs extra time  Level 7 ([address] [place] [address]): Independent for all p.o.  SADNY. (2003). National Outcomes Measurement System (NOMS): Adult Speech-Language Pathology User's Guide. Pain:  Pain Scale 1: Numeric (0 - 10)  Pain Intensity 1: 10     After treatment:   []            Patient left in no apparent distress sitting up in chair  []            Patient left in no apparent distress in bed  []            Call bell left within reach  []            Nursing notified  []            Caregiver present  []            Bed alarm activated    COMMUNICATION/EDUCATION:   The patients plan of care including recommendations, planned interventions, and recommended diet changes were discussed with: Registered Nurse. Patient was educated regarding His deficit(s) of dysphagia as this relates to His diagnosis of tonsilar CA.   He demonstrated Fair understanding as evidenced by patient feeling poorly and distracted by pain. []            Posted safety precautions in patient's room. [x]            Patient/family have participated as able in goal setting and plan of care. [x]            Patient/family agree to work toward stated goals and plan of care. []            Patient understands intent and goals of therapy, but is neutral about his/her participation. []            Patient is unable to participate in goal setting and plan of care.     Thank you for this referral.  ES Warren  Time Calculation: 20 mins

## 2018-09-11 LAB
ALBUMIN SERPL-MCNC: 3.3 G/DL (ref 3.5–5)
ALBUMIN/GLOB SERPL: 0.8 {RATIO} (ref 1.1–2.2)
ALP SERPL-CCNC: 63 U/L (ref 45–117)
ALT SERPL-CCNC: 94 U/L (ref 12–78)
ANION GAP SERPL CALC-SCNC: 11 MMOL/L (ref 5–15)
AST SERPL-CCNC: 36 U/L (ref 15–37)
BASOPHILS # BLD: 0 K/UL (ref 0–0.1)
BASOPHILS NFR BLD: 0 % (ref 0–1)
BILIRUB SERPL-MCNC: 0.5 MG/DL (ref 0.2–1)
BUN SERPL-MCNC: 40 MG/DL (ref 6–20)
BUN/CREAT SERPL: 14 (ref 12–20)
CALCIUM SERPL-MCNC: 7.8 MG/DL (ref 8.5–10.1)
CHLORIDE SERPL-SCNC: 96 MMOL/L (ref 97–108)
CO2 SERPL-SCNC: 26 MMOL/L (ref 21–32)
CREAT SERPL-MCNC: 2.84 MG/DL (ref 0.7–1.3)
DIFFERENTIAL METHOD BLD: ABNORMAL
EOSINOPHIL # BLD: 0.1 K/UL (ref 0–0.4)
EOSINOPHIL NFR BLD: 4 % (ref 0–7)
ERYTHROCYTE [DISTWIDTH] IN BLOOD BY AUTOMATED COUNT: 13.1 % (ref 11.5–14.5)
GLOBULIN SER CALC-MCNC: 4.2 G/DL (ref 2–4)
GLUCOSE SERPL-MCNC: 90 MG/DL (ref 65–100)
HCT VFR BLD AUTO: 31.5 % (ref 36.6–50.3)
HGB BLD-MCNC: 11 G/DL (ref 12.1–17)
IMM GRANULOCYTES # BLD: 0 K/UL (ref 0–0.04)
IMM GRANULOCYTES NFR BLD AUTO: 0 % (ref 0–0.5)
LYMPHOCYTES # BLD: 0.5 K/UL (ref 0.8–3.5)
LYMPHOCYTES NFR BLD: 17 % (ref 12–49)
MAGNESIUM SERPL-MCNC: 1.5 MG/DL (ref 1.6–2.4)
MCH RBC QN AUTO: 28.9 PG (ref 26–34)
MCHC RBC AUTO-ENTMCNC: 34.9 G/DL (ref 30–36.5)
MCV RBC AUTO: 82.9 FL (ref 80–99)
MONOCYTES # BLD: 0.6 K/UL (ref 0–1)
MONOCYTES NFR BLD: 21 % (ref 5–13)
NEUTS SEG # BLD: 1.7 K/UL (ref 1.8–8)
NEUTS SEG NFR BLD: 58 % (ref 32–75)
NRBC # BLD: 0 K/UL (ref 0–0.01)
NRBC BLD-RTO: 0 PER 100 WBC
PLATELET # BLD AUTO: 169 K/UL (ref 150–400)
PMV BLD AUTO: 9.3 FL (ref 8.9–12.9)
POTASSIUM SERPL-SCNC: 3.4 MMOL/L (ref 3.5–5.1)
PROT SERPL-MCNC: 7.5 G/DL (ref 6.4–8.2)
RBC # BLD AUTO: 3.8 M/UL (ref 4.1–5.7)
RBC MORPH BLD: ABNORMAL
SODIUM SERPL-SCNC: 133 MMOL/L (ref 136–145)
WBC # BLD AUTO: 2.9 K/UL (ref 4.1–11.1)

## 2018-09-11 PROCEDURE — 74011250637 HC RX REV CODE- 250/637: Performed by: INTERNAL MEDICINE

## 2018-09-11 PROCEDURE — 83735 ASSAY OF MAGNESIUM: CPT | Performed by: INTERNAL MEDICINE

## 2018-09-11 PROCEDURE — 74011250636 HC RX REV CODE- 250/636: Performed by: INTERNAL MEDICINE

## 2018-09-11 PROCEDURE — C9113 INJ PANTOPRAZOLE SODIUM, VIA: HCPCS | Performed by: INTERNAL MEDICINE

## 2018-09-11 PROCEDURE — 65270000015 HC RM PRIVATE ONCOLOGY

## 2018-09-11 PROCEDURE — 77014 HC CT SCAN FOR THERAPY GUIDE: CPT

## 2018-09-11 PROCEDURE — 74011000250 HC RX REV CODE- 250: Performed by: INTERNAL MEDICINE

## 2018-09-11 PROCEDURE — 36415 COLL VENOUS BLD VENIPUNCTURE: CPT | Performed by: INTERNAL MEDICINE

## 2018-09-11 PROCEDURE — 92526 ORAL FUNCTION THERAPY: CPT

## 2018-09-11 PROCEDURE — 3E0G76Z INTRODUCTION OF NUTRITIONAL SUBSTANCE INTO UPPER GI, VIA NATURAL OR ARTIFICIAL OPENING: ICD-10-PCS | Performed by: INTERNAL MEDICINE

## 2018-09-11 PROCEDURE — 77030018798 HC PMP KT ENTRL FED COVD -A

## 2018-09-11 PROCEDURE — 77386 HC IMRT TRMT DLVR COMPL: CPT

## 2018-09-11 PROCEDURE — 85025 COMPLETE CBC W/AUTO DIFF WBC: CPT | Performed by: INTERNAL MEDICINE

## 2018-09-11 PROCEDURE — 80053 COMPREHEN METABOLIC PANEL: CPT | Performed by: INTERNAL MEDICINE

## 2018-09-11 PROCEDURE — 74011250636 HC RX REV CODE- 250/636: Performed by: RADIOLOGY

## 2018-09-11 RX ORDER — ONDANSETRON 2 MG/ML
8 INJECTION INTRAMUSCULAR; INTRAVENOUS
Status: DISCONTINUED | OUTPATIENT
Start: 2018-09-11 | End: 2018-09-25 | Stop reason: HOSPADM

## 2018-09-11 RX ORDER — MAGNESIUM SULFATE HEPTAHYDRATE 40 MG/ML
2 INJECTION, SOLUTION INTRAVENOUS ONCE
Status: COMPLETED | OUTPATIENT
Start: 2018-09-11 | End: 2018-09-11

## 2018-09-11 RX ORDER — LIDOCAINE HYDROCHLORIDE 20 MG/ML
15 SOLUTION OROPHARYNGEAL AS NEEDED
Status: DISCONTINUED | OUTPATIENT
Start: 2018-09-11 | End: 2018-09-25 | Stop reason: HOSPADM

## 2018-09-11 RX ORDER — POTASSIUM CHLORIDE 7.45 MG/ML
10 INJECTION INTRAVENOUS
Status: COMPLETED | OUTPATIENT
Start: 2018-09-11 | End: 2018-09-12

## 2018-09-11 RX ORDER — MORPHINE SULFATE 10 MG/ML
2 INJECTION, SOLUTION INTRAMUSCULAR; INTRAVENOUS
Status: DISCONTINUED | OUTPATIENT
Start: 2018-09-11 | End: 2018-09-13

## 2018-09-11 RX ORDER — POTASSIUM CHLORIDE 7.45 MG/ML
10 INJECTION INTRAVENOUS
Status: DISPENSED | OUTPATIENT
Start: 2018-09-11 | End: 2018-09-11

## 2018-09-11 RX ADMIN — POTASSIUM CHLORIDE 10 MEQ: 10 INJECTION, SOLUTION INTRAVENOUS at 13:24

## 2018-09-11 RX ADMIN — POTASSIUM CHLORIDE 10 MEQ: 10 INJECTION, SOLUTION INTRAVENOUS at 16:38

## 2018-09-11 RX ADMIN — LORAZEPAM 1 MG: 2 INJECTION INTRAMUSCULAR; INTRAVENOUS at 17:08

## 2018-09-11 RX ADMIN — ONDANSETRON 4 MG: 2 INJECTION INTRAMUSCULAR; INTRAVENOUS at 02:26

## 2018-09-11 RX ADMIN — NYSTATIN 500000 UNITS: 100000 SUSPENSION ORAL at 17:42

## 2018-09-11 RX ADMIN — ENOXAPARIN SODIUM 40 MG: 40 INJECTION, SOLUTION INTRAVENOUS; SUBCUTANEOUS at 21:06

## 2018-09-11 RX ADMIN — ONDANSETRON 4 MG: 2 INJECTION INTRAMUSCULAR; INTRAVENOUS at 09:03

## 2018-09-11 RX ADMIN — LIDOCAINE HYDROCHLORIDE 5 ML: 20 SOLUTION ORAL; TOPICAL at 17:42

## 2018-09-11 RX ADMIN — ENOXAPARIN SODIUM 40 MG: 40 INJECTION, SOLUTION INTRAVENOUS; SUBCUTANEOUS at 10:56

## 2018-09-11 RX ADMIN — MORPHINE SULFATE 2 MG: 10 INJECTION INTRAVENOUS at 14:39

## 2018-09-11 RX ADMIN — NYSTATIN 500000 UNITS: 100000 SUSPENSION ORAL at 08:41

## 2018-09-11 RX ADMIN — NYSTATIN 500000 UNITS: 100000 SUSPENSION ORAL at 21:05

## 2018-09-11 RX ADMIN — OXYCODONE HYDROCHLORIDE 10 MG: 5 TABLET ORAL at 06:58

## 2018-09-11 RX ADMIN — LIDOCAINE HYDROCHLORIDE 5 ML: 20 SOLUTION ORAL; TOPICAL at 10:55

## 2018-09-11 RX ADMIN — MORPHINE SULFATE 2 MG: 10 INJECTION INTRAVENOUS at 11:01

## 2018-09-11 RX ADMIN — ONDANSETRON 4 MG: 2 INJECTION INTRAMUSCULAR; INTRAVENOUS at 06:02

## 2018-09-11 RX ADMIN — MORPHINE SULFATE 2 MG: 10 INJECTION INTRAVENOUS at 02:26

## 2018-09-11 RX ADMIN — NYSTATIN 500000 UNITS: 100000 SUSPENSION ORAL at 13:24

## 2018-09-11 RX ADMIN — MORPHINE SULFATE 2 MG: 10 INJECTION INTRAVENOUS at 06:03

## 2018-09-11 RX ADMIN — SODIUM CHLORIDE 100 ML/HR: 900 INJECTION, SOLUTION INTRAVENOUS at 07:18

## 2018-09-11 RX ADMIN — MAGNESIUM SULFATE HEPTAHYDRATE 2 G: 40 INJECTION, SOLUTION INTRAVENOUS at 11:02

## 2018-09-11 RX ADMIN — POTASSIUM CHLORIDE 10 MEQ: 10 INJECTION, SOLUTION INTRAVENOUS at 02:22

## 2018-09-11 RX ADMIN — POTASSIUM CHLORIDE 10 MEQ: 10 INJECTION, SOLUTION INTRAVENOUS at 21:06

## 2018-09-11 RX ADMIN — ONDANSETRON 8 MG: 2 INJECTION INTRAMUSCULAR; INTRAVENOUS at 14:56

## 2018-09-11 RX ADMIN — MORPHINE SULFATE 2 MG: 10 INJECTION INTRAVENOUS at 20:12

## 2018-09-11 RX ADMIN — PANTOPRAZOLE SODIUM 40 MG: 40 INJECTION, POWDER, FOR SOLUTION INTRAVENOUS at 17:42

## 2018-09-11 NOTE — PROGRESS NOTES
Problem: Dysphagia (Adult)  Goal: *Speech Goal: (INSERT TEXT)  1. Patient will tolerate puree diet with thin liquids without signs/symptoms of aspiration given no cues within 7 day(s). Speech language pathology dysphagia treatment  Patient: Eliseo Gonzalez (43 y.o. male)  Date: 9/11/2018  Diagnosis: nausea, vomiting, dehydration  Intractable nausea and vomiting Intractable nausea and vomiting       Precautions:       ASSESSMENT:  Pt continues to have very painful swallow. When he swallowed water he coughed immediately and expectorated a lot of mucus which was tan colored secretions. This was concerning. Curious if it could have been TF from esophageal reflux. Staff reported they were having difficulty understanding his speech. Intelligibility is reduced at least mildly. He was encouraged to write to express himself. His handwriting was very neat and legible. We also wrote a few sentences that he would use frequently that he could point to vs writing them over and over. He was encouraged to attend OP speech therapy to learn swallowing exercises that he should use consistently to avoid disuse atrophy. Progression toward goals:  []         Improving appropriately and progressing toward goals  []         Improving slowly and progressing toward goals  [x]         Not making progress toward goals and plan of care will be adjusted     PLAN:  Recommendations and Planned Interventions:  Recommend continuing efforts to take po but he needs better pain control. He is using magic mouthwash but reported he only had used it once. It is prn and he needs to ask for it. Could he have anything else for pain? Patient continues to benefit from skilled intervention to address the above impairments. Continue treatment per established plan of care. Discharge Recommendations:  None     SUBJECTIVE:   Patient stated it hurt to swallow.      OBJECTIVE:   Cognitive and Communication Status:  Neurologic State: Alert  Orientation Level: Appropriate for age     Perception: Appears intact  Perseveration: No perseveration noted     Dysphagia Treatment:  Oral Assessment:     P.O. Trials:  Patient Position: upright in bed  Vocal quality prior to P.O.: Low volume  Consistency Presented: Thin liquid  How Presented: Self-fed/presented;Straw     Bolus Acceptance: No impairment        Propulsion: No impairment  Oral Residue: None        Aspiration Signs/Symptoms: Strong cough  Pharyngeal Phase Characteristics: Painful swallow             Oral Phase Severity: Mild  Pharyngeal Phase Severity : Mild              Pain:  Pain Scale 1: Numeric (0 - 10)  Pain Intensity 1: 9  Pain Location 1: Mouth; Throat  After treatment:   []              Patient left in no apparent distress sitting up in chair  [x]              Patient left in no apparent distress in bed  [x]              Call bell left within reach  [x]              Nursing notified  []              Caregiver present  []              Bed alarm activated    COMMUNICATION/EDUCATION:       The patients plan of care including recommendations, planned interventions, and recommended diet changes were discussed with: Registered Nurse. []              Posted safety precautions in patient's room.     ES Jones  Time Calculation: 20 mins

## 2018-09-11 NOTE — CDMP QUERY
Account Number: [de-identified] MRN: [id number] Patient: Nusrat Carreno Created: [id number] Clinician Name: Noris Hwang MD : 
Please clarify if this patient is (was) being treated/managed for:  
 
=> Acute kidney failure, as evidenced by N/V,  buncr 46/2.60/27 req TF, Nystatin, ivfs @ 100/hr 
=> Other explanation of clinical findings 
=> Clinically Undetermined (no explanation for clinical findings) The medical record reflects the following clinical findings, treatment, and risk factors. Risk Factors:  42m s/p Cisplatin 9/5 for tonsillar ca Clinical Indicators:  buncr 46/2.60/27  ( 9/1.11/60) Treatment: TF, Nystatin, ivfs @ 100/hr Please clarify and document your clinical opinion in the progress notes and discharge summary including the definitive and/or presumptive diagnosis, (suspected or probable), related to the above clinical findings. Please include clinical findings supporting your diagnosis. Thank Shelby Aldana Rn/CDMP

## 2018-09-11 NOTE — PROGRESS NOTES
Hematology Oncology Progress Note       Follow up for: head and neck cancer (left tonsil)    Chart notes reviewed since last visit. Case discussed with following: nursing staff. Patient complains of the following: severe soreness of mouth, over production of mucous, nausea. Additional concerns noted by the staff:     Patient Vitals for the past 24 hrs:   BP Temp Pulse Resp SpO2   09/11/18 0732 139/86 97.6 °F (36.4 °C) 98 18 100 %   09/10/18 2220 119/62 98.6 °F (37 °C) 82 18 100 %   09/10/18 1938 123/58 98.3 °F (36.8 °C) 81 18 100 %   09/10/18 1647 133/87 - (!) 101 - 100 %   09/10/18 1610 134/82 - 87 - 100 %   09/10/18 1607 125/71 97.8 °F (36.6 °C) 81 18 99 %   09/10/18 1344 - - - - 99 %   09/10/18 1338 (!) 139/95 98.8 °F (37.1 °C) (!) 103 - -       Review of Systems:  Constitutional  fatigue weight loss. mild distress secondary to pain with swallowing. Allergic/Immunologic No recent allergic reactions   Eyes No significant visual difficulties. No diplopia. ENMT sore throat   Endocrine No hot flashes or night sweats. No cold intolerance, polyuria, or polydipsia   Hematologic/Lymphatic No easy bruising or bleeding. The patient denies any tender or palpable lymph nodes   Breasts No abnormal masses of breast, nipple discharge or pain. Respiratory No dyspnea on exertion, orthopnea, chest pain, cough or hemoptysis. Cardiovascular No anginal chest pain, irregular heart beat, tachycardia, palpitations or orthopnea. Gastrointestinal  nausea,  dysphagia   Genitourinary (M) No hematuria, dysuria, increased frequency, urgency, hesitancy or incontinence. Musculoskeletal No joint pain, swelling or redness. No decreased range of motion. Integumentary No chronic rashes, inflammation, ulcerations, pruritus, petechiae, purpura, ecchymoses, or skin changes. Neurologic No headache, blurred vision, and no areas of focal weakness or numbness. Psychiatric No insomnia, depression, dayne or mood swings. Physical Examination:  Constitutional Alert, cooperative, oriented. Mood and affect appropriate. Appears close to chronological age. Well nourished. Well developed. Head Normocephalic; no scars   Eyes Conjunctivae and sclerae are clear and without icterus. Pupils are round   ENMT Sinuses are nontender. Posterior mucositis not well visualized due to limited ability to open mouth fully. Copious mucous    Neck Supple without masses or thyromegaly. No jugular venous distension. Hematologic/Lymphatic No petechiae or purpura. No tender or palpable lymph nodes noted   Respiratory Lungs are clear to auscultation without rhonchi or wheezing. Cardiovascular Regular rate and rhythm of heart    Chest / Line Site Chest is symmetric with no chest wall deformities. Abdomen Non-tender, non-distended, no masses, ascites or hepatosplenomegaly. Good bowel sounds. Musculoskeletal No tenderness or swelling, normal range of motion without obvious weakness. Extremities No visible deformities, no cyanosis, clubbing or edema. Skin No rashes, scars, or lesions suggestive of malignancy. No petechiae, purpura, or ecchymoses. No excoriations. Neurologic No sensory or motor deficits noted grossly. Psychiatric Alert and oriented. Coherent speech. Verbalizes understanding of our discussions today.        Labs:  Recent Results (from the past 24 hour(s))   CBC WITH AUTOMATED DIFF    Collection Time: 09/10/18  3:14 PM   Result Value Ref Range    WBC 2.5 (L) 4.1 - 11.1 K/uL    RBC 3.78 (L) 4.10 - 5.70 M/uL    HGB 10.8 (L) 12.1 - 17.0 g/dL    HCT 31.1 (L) 36.6 - 50.3 %    MCV 82.3 80.0 - 99.0 FL    MCH 28.6 26.0 - 34.0 PG    MCHC 34.7 30.0 - 36.5 g/dL    RDW 12.8 11.5 - 14.5 %    PLATELET 582 769 - 858 K/uL    MPV 9.5 8.9 - 12.9 FL    NRBC 0.0 0  WBC    ABSOLUTE NRBC 0.00 0.00 - 0.01 K/uL    NEUTROPHILS 72 32 - 75 %    LYMPHOCYTES 8 (L) 12 - 49 %    MONOCYTES 18 (H) 5 - 13 %    EOSINOPHILS 2 0 - 7 %    BASOPHILS 0 0 - 1 %    IMMATURE GRANULOCYTES 0 0.0 - 0.5 %    TOTAL CELLS COUNTED 50      ABS. NEUTROPHILS 1.7 (L) 1.8 - 8.0 K/UL    ABS. LYMPHOCYTES 0.2 (L) 0.8 - 3.5 K/UL    ABS. MONOCYTES 0.5 0.0 - 1.0 K/UL    ABS. EOSINOPHILS 0.1 0.0 - 0.4 K/UL    ABS. BASOPHILS 0.0 0.0 - 0.1 K/UL    ABS. IMM. GRANS. 0.0 0.00 - 0.04 K/UL    DF AUTOMATED      RBC COMMENTS NORMOCYTIC, NORMOCHROMIC     METABOLIC PANEL, COMPREHENSIVE    Collection Time: 09/10/18  3:14 PM   Result Value Ref Range    Sodium 132 (L) 136 - 145 mmol/L    Potassium 3.1 (L) 3.5 - 5.1 mmol/L    Chloride 94 (L) 97 - 108 mmol/L    CO2 25 21 - 32 mmol/L    Anion gap 13 5 - 15 mmol/L    Glucose 83 65 - 100 mg/dL    BUN 46 (H) 6 - 20 MG/DL    Creatinine 2.60 (H) 0.70 - 1.30 MG/DL    BUN/Creatinine ratio 18 12 - 20      GFR est AA 33 (L) >60 ml/min/1.73m2    GFR est non-AA 27 (L) >60 ml/min/1.73m2    Calcium 7.8 (L) 8.5 - 10.1 MG/DL    Bilirubin, total 0.4 0.2 - 1.0 MG/DL    ALT (SGPT) 103 (H) 12 - 78 U/L    AST (SGOT) 41 (H) 15 - 37 U/L    Alk. phosphatase 61 45 - 117 U/L    Protein, total 7.1 6.4 - 8.2 g/dL    Albumin 3.2 (L) 3.5 - 5.0 g/dL    Globulin 3.9 2.0 - 4.0 g/dL    A-G Ratio 0.8 (L) 1.1 - 2.2     MAGNESIUM    Collection Time: 09/10/18  3:14 PM   Result Value Ref Range    Magnesium 1.6 1.6 - 2.4 mg/dL   CBC WITH AUTOMATED DIFF    Collection Time: 09/11/18  5:54 AM   Result Value Ref Range    WBC 2.9 (L) 4.1 - 11.1 K/uL    RBC 3.80 (L) 4.10 - 5.70 M/uL    HGB 11.0 (L) 12.1 - 17.0 g/dL    HCT 31.5 (L) 36.6 - 50.3 %    MCV 82.9 80.0 - 99.0 FL    MCH 28.9 26.0 - 34.0 PG    MCHC 34.9 30.0 - 36.5 g/dL    RDW 13.1 11.5 - 14.5 %    PLATELET 247 743 - 310 K/uL    MPV 9.3 8.9 - 12.9 FL    NRBC 0.0 0  WBC    ABSOLUTE NRBC 0.00 0.00 - 0.01 K/uL    NEUTROPHILS 58 32 - 75 %    LYMPHOCYTES 17 12 - 49 %    MONOCYTES 21 (H) 5 - 13 %    EOSINOPHILS 4 0 - 7 %    BASOPHILS 0 0 - 1 %    IMMATURE GRANULOCYTES 0 0.0 - 0.5 %    ABS. NEUTROPHILS 1.7 (L) 1.8 - 8.0 K/UL    ABS. LYMPHOCYTES 0.5 (L) 0.8 - 3.5 K/UL    ABS. MONOCYTES 0.6 0.0 - 1.0 K/UL    ABS. EOSINOPHILS 0.1 0.0 - 0.4 K/UL    ABS. BASOPHILS 0.0 0.0 - 0.1 K/UL    ABS. IMM. GRANS. 0.0 0.00 - 0.04 K/UL    DF AUTOMATED      RBC COMMENTS NORMOCYTIC, NORMOCHROMIC     METABOLIC PANEL, COMPREHENSIVE    Collection Time: 09/11/18  5:54 AM   Result Value Ref Range    Sodium 133 (L) 136 - 145 mmol/L    Potassium 3.4 (L) 3.5 - 5.1 mmol/L    Chloride 96 (L) 97 - 108 mmol/L    CO2 26 21 - 32 mmol/L    Anion gap 11 5 - 15 mmol/L    Glucose 90 65 - 100 mg/dL    BUN 40 (H) 6 - 20 MG/DL    Creatinine 2.84 (H) 0.70 - 1.30 MG/DL    BUN/Creatinine ratio 14 12 - 20      GFR est AA 30 (L) >60 ml/min/1.73m2    GFR est non-AA 25 (L) >60 ml/min/1.73m2    Calcium 7.8 (L) 8.5 - 10.1 MG/DL    Bilirubin, total 0.5 0.2 - 1.0 MG/DL    ALT (SGPT) 94 (H) 12 - 78 U/L    AST (SGOT) 36 15 - 37 U/L    Alk. 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 PEG tube feedings if tolerated. Symptomatic meds - m,agic mouthwash, viscous lidocaine, morphine as tolerated. Hypokalemia, hypomagnesemia - will supplement    [pain control - will increase the frequency of his morphine.

## 2018-09-11 NOTE — PROGRESS NOTES
Oncology CM completed chart review. CM was notified by Adela Ewing with [phone] that Pt is a [address] Pt. Pt would need to be transferred to another [facility] or [facility] that is in network with this insurance. Pt has Medicaid Policy but the [facility] is the primary. Dr. Honore Holstein indicated that Pt is getting treatments currently in MOB [address] here at [address] at the [facility]. CM spoke to Pt to verify his insurance. Pt indicated that he does have [address] and he is applying for Medicaid but it has not been approved yet. CM explained to Pt that his insurance will not cover the hospital stay here at [address] and that they will cover it at another hospital.   Pt is hoping to go to [facility] so that they can cover his treatments there. CM talked to Dr. Honore Holstein and she would like for me to call the [facility] transfer center. [phone]. CM called the transfer center and they are currently not accepting any cancer Pt today. Transfer Center instructed me to call back later today to see if they have any beds. CM called [facility] to see if they have any Oncology Beds: [phone] Spoke to Lovsophia Sumanth and gave her clinical information. She will review bed availiablility and have the [facility] MD called Dr. Honore Holstein directly to discuss case. Pt requested Poornima Junior if he had to go to [place] [phone] let MD that [facility] MD would be calling to discuss case. DEBRA let Adela Ewing with [facility] know where we are with the process. 1:56 PM   CM spoke to RN that stated she talked to Dr. Honore Holstein and that [facility] has beds but [facility] MDs: Dr. Honore Holstein or Dr. Sussy Trejo need to find an accepting admitting [place] [facility] [place] that they could assign it to a hospitalist if needed. CM will wait to hear from [facility] or Dr. Honore Holstein about how to proceed with this case. 2:12 PM   CM spoke to Dr. Honore Holstein. She asked me to check with [facility]. CM called [facility] transfer center and they still do not have beds available.   CM let Dr. Na Christopher know. She is going to check with her partner to see if we can get admitting cancer MD that has [facility] priviledges. 4:39 PM   Dr. Na Christopher talked to her partner Dr. Olga Bay and they are planning to transfer Pt to [facility] tomorrow, 9/12/18. CM will need to call transfer center tomorrow at [facility] [phone] and let them know that Pt needs to transfer to Palliative Bed and to talk to Dr. Jonathan Mullins. CM will WILL CALL transport to [facility] through [employer] since Pt will need EMTALA transport since it is a Hospital to Hospital transfer. 4:59 PM   Reason for Admission:   Pt was admitted on 9/10/18 d/t Head and Neck Cancer, Mucositis. RRAT Score:          3           Plan for utilizing home health:      No plans for [place]                     Likelihood of Readmission:  LOW                         Transition of Care Plan:                  Pt lives with Sister and her  in two story home. Pt lives on first floor. Pt has tube feeding machine at home. Pt has used [facility] in the past.  Pt has no SNF experience. Pharmacy is [facility] on [address] [place] [address]. CM let Pt know of possible transfer to [facility] tomorrow and he is in agreement with that plan. CM will continue to monitor discharge plan. Care Management Interventions  PCP Verified by CM: Yes  Palliative Care Criteria Met (RRAT>21 & CHF Dx)?: No  Mode of Transport at Discharge: S  Transition of Care Consult (CM Consult):  Other (TRANSFER TO [facility] D/T [place] PATIENT)  MyChart Signup: No  Discharge Durable Medical Equipment: No  Physical Therapy Consult: No  Occupational Therapy Consult: No  Speech Therapy Consult: No  Confirm Follow Up Transport: Family  Plan discussed with Pt/Family/Caregiver: Yes  Freedom of Choice Offered: Yes  Discharge Location  Discharge Placement: Other: ([facility] )    Ronaldo Fall

## 2018-09-11 NOTE — PROGRESS NOTES
Have contacted both [facility] and [facility] transfer depts. [facility] has tentatively accepted him for transfer tomorrow AM when they will have the staff to handle the admission there. Earlier today they were on restricted admission and transfer status and ER not accepting new pts. If he goes to [facility], he can continue his radiation therapy - this is a far better option than transfer to [facility] where his treatment would be interupted.

## 2018-09-11 NOTE — PROGRESS NOTES
Oncology Interdisciplinary rounds were held today to discuss patient plan of care and outcomes. The following members were present: Nursing, Case Management, Pharmacy and Dietary.     Actual Length of Stay: 1    DRG GLOS: 3    Expected Length of Stay: 3d 0h                       Plan               Mobility         Discharge Plan   Pt started inpt radiation  Pt stated \"afraid\" of using peg tube -needs follow up with concerns (primary nurse aware and discussing in more detail with pt)  Speech consult to obtain white board to better communicate with pt  Spoke to Dr Elizabeth Cardenas to make changes to nausea medication Up with walker x1 Home- working on transport for pt (ins possible not covering stay bc type of insurance?)

## 2018-09-11 NOTE — PROGRESS NOTES
Problem: Falls - Risk of  Goal: *Absence of Falls  Document Jason Fall Risk and appropriate interventions in the flowsheet.    Outcome: Progressing Towards Goal  Fall Risk Interventions:            Medication Interventions: Teach patient to arise slowly, Patient to call before getting OOB

## 2018-09-11 NOTE — PROGRESS NOTES
Physical Therapy  Attempted to see patient x 2 this afternoon. Patient declined first visit then was doing an interview over the phone on second attempt. Will continue to follow.

## 2018-09-11 NOTE — PROGRESS NOTES
Oncology Nursing Communication Tool  7:26 PM  9/11/2018     Bedside shift change report given to ROCIO Serrano (incoming nurse) by Janak Johnson (outgoing nurse) on [address]. Report included the following information SBAR, Kardex, MAR and Recent Results. Shift Summary: complaints of mouth and throat pain throughout shift. Patient refused tubed feeding. Patient was unable to swallow pills during shift      Issues for physician to address: Nutritional needs/recommedations/eduaction       Oncology Shift Note   Admission Date 9/10/2018   Admission Diagnosis nausea, vomiting, dehydration  Intractable nausea and vomiting   Code Status Full Code   Consults IP CONSULT TO RADIATION ONCOLOGY      Cardiac Monitoring [] Yes [] No      Purposeful Hourly Rounding [] Yes    Jason Score Total Score: 0   Jason score 3 or > [] Bed Alarm [] Avasys [] 1:1 sitter [] Patient refused (Place signed refusal form in chart)      Pain Managed [] Yes [] No    Key Pain Meds             oxyCODONE-acetaminophen (PERCOCET) 5-325 mg per tablet  (Taking) Take 1 Tab by mouth every four (4) hours as needed for Pain. Max Daily Amount: 6 Tabs. Influenza Vaccine Received Flu Vaccine for Current Season (usually Sept-March): Not Flu Season           Oxygen needs? [] Room air Oxygen @  []1L    []2L    []3L   []4L    []5L   []6L     Use home O2? [] Yes [] No  Perform O2 challenge test using  smartphrase (.oxygenchallenge)      Last bowel movement    bowel movement      Urinary Catheter             LDAs                     PEG/Gastrostomy Tube 09/10/18 (Active)   Site Assessment Clean, dry, & intact 9/10/2018  8:20 PM   Dressing Status Clean, dry, & intact 9/10/2018  8:20 PM   G Port Status Clamped 9/10/2018  8:20 PM                   Readmission Risk Assessment Tool Score Low Risk            3       Total Score        3 Has Seen PCP in Last 6 Months (Yes=3, No=0)        Criteria that do not apply:    .  Living with Significant Other. Assisted Living. LTAC. SNF. or   Rehab    Patient Length of Stay (>5 days = 3)    IP Visits Last 12 Months (1-3=4, 4=9, >4=11)    Pt.  Coverage (Medicare=5 , Medicaid, or Self-Pay=4)    Charlson Comorbidity Score (Age + Comorbid Conditions)       Expected Length of Stay 3d 0h   Actual Length of Stay 1          Kirti Weiner

## 2018-09-12 LAB
ALBUMIN SERPL-MCNC: 3 G/DL (ref 3.5–5)
ALBUMIN/GLOB SERPL: 0.7 {RATIO} (ref 1.1–2.2)
ALP SERPL-CCNC: 60 U/L (ref 45–117)
ALT SERPL-CCNC: 70 U/L (ref 12–78)
ANION GAP SERPL CALC-SCNC: 12 MMOL/L (ref 5–15)
AST SERPL-CCNC: 24 U/L (ref 15–37)
BASOPHILS # BLD: 0 K/UL (ref 0–0.1)
BASOPHILS NFR BLD: 0 % (ref 0–1)
BILIRUB SERPL-MCNC: 0.6 MG/DL (ref 0.2–1)
BUN SERPL-MCNC: 36 MG/DL (ref 6–20)
BUN/CREAT SERPL: 14 (ref 12–20)
CALCIUM SERPL-MCNC: 7.9 MG/DL (ref 8.5–10.1)
CHLORIDE SERPL-SCNC: 97 MMOL/L (ref 97–108)
CO2 SERPL-SCNC: 25 MMOL/L (ref 21–32)
CREAT SERPL-MCNC: 2.66 MG/DL (ref 0.7–1.3)
DIFFERENTIAL METHOD BLD: ABNORMAL
EOSINOPHIL # BLD: 0 K/UL (ref 0–0.4)
EOSINOPHIL NFR BLD: 2 % (ref 0–7)
ERYTHROCYTE [DISTWIDTH] IN BLOOD BY AUTOMATED COUNT: 12.6 % (ref 11.5–14.5)
GLOBULIN SER CALC-MCNC: 4.1 G/DL (ref 2–4)
GLUCOSE SERPL-MCNC: 83 MG/DL (ref 65–100)
HCT VFR BLD AUTO: 29.5 % (ref 36.6–50.3)
HGB BLD-MCNC: 10.2 G/DL (ref 12.1–17)
IMM GRANULOCYTES # BLD: 0 K/UL (ref 0–0.04)
IMM GRANULOCYTES NFR BLD AUTO: 0 % (ref 0–0.5)
LYMPHOCYTES # BLD: 0.4 K/UL (ref 0.8–3.5)
LYMPHOCYTES NFR BLD: 15 % (ref 12–49)
MAGNESIUM SERPL-MCNC: 1.7 MG/DL (ref 1.6–2.4)
MCH RBC QN AUTO: 28.7 PG (ref 26–34)
MCHC RBC AUTO-ENTMCNC: 34.6 G/DL (ref 30–36.5)
MCV RBC AUTO: 83.1 FL (ref 80–99)
MONOCYTES # BLD: 0.6 K/UL (ref 0–1)
MONOCYTES NFR BLD: 24 % (ref 5–13)
NEUTS SEG # BLD: 1.5 K/UL (ref 1.8–8)
NEUTS SEG NFR BLD: 59 % (ref 32–75)
NRBC # BLD: 0 K/UL (ref 0–0.01)
NRBC BLD-RTO: 0 PER 100 WBC
PLATELET # BLD AUTO: 128 K/UL (ref 150–400)
PMV BLD AUTO: 9 FL (ref 8.9–12.9)
POTASSIUM SERPL-SCNC: 3.7 MMOL/L (ref 3.5–5.1)
PROT SERPL-MCNC: 7.1 G/DL (ref 6.4–8.2)
RBC # BLD AUTO: 3.55 M/UL (ref 4.1–5.7)
SODIUM SERPL-SCNC: 134 MMOL/L (ref 136–145)
WBC # BLD AUTO: 2.5 K/UL (ref 4.1–11.1)

## 2018-09-12 PROCEDURE — C9113 INJ PANTOPRAZOLE SODIUM, VIA: HCPCS | Performed by: INTERNAL MEDICINE

## 2018-09-12 PROCEDURE — 74011250636 HC RX REV CODE- 250/636: Performed by: INTERNAL MEDICINE

## 2018-09-12 PROCEDURE — 97116 GAIT TRAINING THERAPY: CPT

## 2018-09-12 PROCEDURE — 74011250636 HC RX REV CODE- 250/636: Performed by: RADIOLOGY

## 2018-09-12 PROCEDURE — 85025 COMPLETE CBC W/AUTO DIFF WBC: CPT | Performed by: INTERNAL MEDICINE

## 2018-09-12 PROCEDURE — 74011250637 HC RX REV CODE- 250/637: Performed by: INTERNAL MEDICINE

## 2018-09-12 PROCEDURE — 65270000015 HC RM PRIVATE ONCOLOGY

## 2018-09-12 PROCEDURE — 97530 THERAPEUTIC ACTIVITIES: CPT

## 2018-09-12 PROCEDURE — 83735 ASSAY OF MAGNESIUM: CPT | Performed by: INTERNAL MEDICINE

## 2018-09-12 PROCEDURE — 80053 COMPREHEN METABOLIC PANEL: CPT | Performed by: INTERNAL MEDICINE

## 2018-09-12 PROCEDURE — 77386 HC IMRT TRMT DLVR COMPL: CPT

## 2018-09-12 PROCEDURE — 36415 COLL VENOUS BLD VENIPUNCTURE: CPT | Performed by: INTERNAL MEDICINE

## 2018-09-12 PROCEDURE — 74011000250 HC RX REV CODE- 250: Performed by: INTERNAL MEDICINE

## 2018-09-12 RX ORDER — GABAPENTIN 250 MG/5ML
300 SOLUTION ORAL EVERY 8 HOURS
Status: DISCONTINUED | OUTPATIENT
Start: 2018-09-12 | End: 2018-09-25 | Stop reason: HOSPADM

## 2018-09-12 RX ADMIN — POTASSIUM CHLORIDE 10 MEQ: 10 INJECTION, SOLUTION INTRAVENOUS at 00:26

## 2018-09-12 RX ADMIN — GABAPENTIN 300 MG: 250 SUSPENSION ORAL at 10:07

## 2018-09-12 RX ADMIN — ONDANSETRON 8 MG: 2 INJECTION INTRAMUSCULAR; INTRAVENOUS at 14:36

## 2018-09-12 RX ADMIN — SODIUM CHLORIDE 100 ML/HR: 900 INJECTION, SOLUTION INTRAVENOUS at 04:35

## 2018-09-12 RX ADMIN — MORPHINE SULFATE 2 MG: 10 INJECTION INTRAVENOUS at 14:36

## 2018-09-12 RX ADMIN — MORPHINE SULFATE 2 MG: 10 INJECTION INTRAVENOUS at 00:32

## 2018-09-12 RX ADMIN — MORPHINE SULFATE 2 MG: 10 INJECTION INTRAVENOUS at 18:30

## 2018-09-12 RX ADMIN — ONDANSETRON 8 MG: 2 INJECTION INTRAMUSCULAR; INTRAVENOUS at 05:12

## 2018-09-12 RX ADMIN — MORPHINE SULFATE 2 MG: 10 INJECTION INTRAVENOUS at 05:12

## 2018-09-12 RX ADMIN — ENOXAPARIN SODIUM 40 MG: 40 INJECTION, SOLUTION INTRAVENOUS; SUBCUTANEOUS at 22:40

## 2018-09-12 RX ADMIN — PANTOPRAZOLE SODIUM 40 MG: 40 INJECTION, POWDER, FOR SOLUTION INTRAVENOUS at 18:07

## 2018-09-12 RX ADMIN — NYSTATIN 500000 UNITS: 100000 SUSPENSION ORAL at 14:22

## 2018-09-12 RX ADMIN — NYSTATIN 500000 UNITS: 100000 SUSPENSION ORAL at 18:06

## 2018-09-12 RX ADMIN — LORAZEPAM 1 MG: 2 INJECTION INTRAMUSCULAR; INTRAVENOUS at 11:10

## 2018-09-12 RX ADMIN — SODIUM CHLORIDE 125 ML/HR: 900 INJECTION, SOLUTION INTRAVENOUS at 21:33

## 2018-09-12 RX ADMIN — NYSTATIN 500000 UNITS: 100000 SUSPENSION ORAL at 22:40

## 2018-09-12 RX ADMIN — ENOXAPARIN SODIUM 40 MG: 40 INJECTION, SOLUTION INTRAVENOUS; SUBCUTANEOUS at 10:07

## 2018-09-12 RX ADMIN — GABAPENTIN 300 MG: 250 SUSPENSION ORAL at 18:07

## 2018-09-12 RX ADMIN — LIDOCAINE HYDROCHLORIDE 5 ML: 20 SOLUTION ORAL; TOPICAL at 18:19

## 2018-09-12 RX ADMIN — PROCHLORPERAZINE EDISYLATE 10 MG: 5 INJECTION INTRAMUSCULAR; INTRAVENOUS at 18:29

## 2018-09-12 RX ADMIN — MORPHINE SULFATE 2 MG: 10 INJECTION INTRAVENOUS at 22:40

## 2018-09-12 RX ADMIN — NYSTATIN 500000 UNITS: 100000 SUSPENSION ORAL at 10:06

## 2018-09-12 NOTE — PROGRESS NOTES
Hematology Oncology Progress Note       Follow up for: head and neck cancer (left tonsil)    Chart notes reviewed since last visit. Case discussed with following: nursing staff. Patient complains of the following: severe soreness of mouth, over production of mucous, nausea. Worried about vomiting and aggravating his mucositis but all of what he is suctioning is clear mucous, not tube feedings. Only tolerated minimal amounts of TF yesterday - got 60 cc and several 30 cc water boluses. Additional concerns noted by the staff:     Patient Vitals for the past 24 hrs:   BP Temp Pulse Resp SpO2 Weight   09/12/18 0802 142/78 97.7 °F (36.5 °C) 80 18 100 % -   09/12/18 0014 145/89 98.8 °F (37.1 °C) 81 18 96 % -   09/11/18 2025 127/78 98.8 °F (37.1 °C) 80 20 97 % -   09/11/18 1447 142/83 97.3 °F (36.3 °C) 85 19 100 % -   09/11/18 1115 - - - - - 123.7 kg (272 lb 11.3 oz)       Review of Systems:  Constitutional  fatigue weight loss. mild distress secondary to pain with swallowing. Allergic/Immunologic No recent allergic reactions   Eyes No significant visual difficulties. No diplopia. ENMT sore throat   Endocrine No hot flashes or night sweats. No cold intolerance, polyuria, or polydipsia   Hematologic/Lymphatic No easy bruising or bleeding. The patient denies any tender or palpable lymph nodes   Breasts No abnormal masses of breast, nipple discharge or pain. Respiratory No dyspnea on exertion, orthopnea, chest pain, cough or hemoptysis. Cardiovascular No anginal chest pain, irregular heart beat, tachycardia, palpitations or orthopnea. Gastrointestinal  nausea,  dysphagia   Genitourinary (M) No hematuria, dysuria, increased frequency, urgency, hesitancy or incontinence. Musculoskeletal No joint pain, swelling or redness. No decreased range of motion. Integumentary No chronic rashes, inflammation, ulcerations, pruritus, petechiae, purpura, ecchymoses, or skin changes.    Neurologic No headache, blurred vision, and no areas of focal weakness or numbness. Psychiatric No insomnia, depression, dayne or mood swings. Physical Examination:  Constitutional Alert, cooperative, oriented. Mood and affect appropriate. Appears close to chronological age. Well nourished. Well developed. Head Normocephalic; no scars   Eyes Conjunctivae and sclerae are clear and without icterus. Pupils are round   ENMT Sinuses are nontender. Posterior mucositis not well visualized due to limited ability to open mouth fully - appears inflammed. Copious mucous    Neck Supple without masses or thyromegaly. No jugular venous distension. Hematologic/Lymphatic No petechiae or purpura. No tender or palpable lymph nodes noted   Respiratory Lungs are clear to auscultation without rhonchi or wheezing. Cardiovascular Regular rate and rhythm of heart    Chest / Line Site Chest is symmetric with no chest wall deformities. Abdomen Non-tender, non-distended, no masses, ascites or hepatosplenomegaly. Good bowel sounds. Musculoskeletal No tenderness or swelling, normal range of motion without obvious weakness. Extremities No visible deformities, no cyanosis, clubbing or edema. Skin No rashes, scars, or lesions suggestive of malignancy. No petechiae, purpura, or ecchymoses. No excoriations. Neurologic No sensory or motor deficits noted grossly. Psychiatric Alert and oriented. Coherent speech. Verbalizes understanding of our discussions today.        Labs:  Recent Results (from the past 24 hour(s))   CBC WITH AUTOMATED DIFF    Collection Time: 09/12/18  4:36 AM   Result Value Ref Range    WBC 2.5 (L) 4.1 - 11.1 K/uL    RBC 3.55 (L) 4.10 - 5.70 M/uL    HGB 10.2 (L) 12.1 - 17.0 g/dL    HCT 29.5 (L) 36.6 - 50.3 %    MCV 83.1 80.0 - 99.0 FL    MCH 28.7 26.0 - 34.0 PG    MCHC 34.6 30.0 - 36.5 g/dL    RDW 12.6 11.5 - 14.5 %    PLATELET 529 (L) 527 - 400 K/uL    MPV 9.0 8.9 - 12.9 FL    NRBC 0.0 0  WBC    ABSOLUTE NRBC 0.00 0.00 - 0.01 K/uL    NEUTROPHILS 59 32 - 75 %    LYMPHOCYTES 15 12 - 49 %    MONOCYTES 24 (H) 5 - 13 %    EOSINOPHILS 2 0 - 7 %    BASOPHILS 0 0 - 1 %    IMMATURE GRANULOCYTES 0 0.0 - 0.5 %    ABS. NEUTROPHILS 1.5 (L) 1.8 - 8.0 K/UL    ABS. LYMPHOCYTES 0.4 (L) 0.8 - 3.5 K/UL    ABS. MONOCYTES 0.6 0.0 - 1.0 K/UL    ABS. EOSINOPHILS 0.0 0.0 - 0.4 K/UL    ABS. BASOPHILS 0.0 0.0 - 0.1 K/UL    ABS. IMM. GRANS. 0.0 0.00 - 0.04 K/UL    DF AUTOMATED     METABOLIC PANEL, COMPREHENSIVE    Collection Time: 09/12/18  4:36 AM   Result Value Ref Range    Sodium 134 (L) 136 - 145 mmol/L    Potassium 3.7 3.5 - 5.1 mmol/L    Chloride 97 97 - 108 mmol/L    CO2 25 21 - 32 mmol/L    Anion gap 12 5 - 15 mmol/L    Glucose 83 65 - 100 mg/dL    BUN 36 (H) 6 - 20 MG/DL    Creatinine 2.66 (H) 0.70 - 1.30 MG/DL    BUN/Creatinine ratio 14 12 - 20      GFR est AA 32 (L) >60 ml/min/1.73m2    GFR est non-AA 27 (L) >60 ml/min/1.73m2    Calcium 7.9 (L) 8.5 - 10.1 MG/DL    Bilirubin, total 0.6 0.2 - 1.0 MG/DL    ALT (SGPT) 70 12 - 78 U/L    AST (SGOT) 24 15 - 37 U/L    Alk. 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 PEG tube feedings if tolerated. Symptomatic meds - m,agic mouthwash, viscous lidocaine, morphine as tolerated. Hypokalemia, hypomagnesemia - supplemented yesterday and corrected. Acute renal failure - creatinine improving with hydration. Will increase a bit since po intake remains minimal.     [pain control - doing better with  Morphine - last dose at 5 AM. Also getting magic mouthwash (last dose 5 PM yesterday).   Last nystatin at 2105 yesterday,     Nausea - on zofran with dose at 5 AM today

## 2018-09-12 NOTE — PROGRESS NOTES
CM called transfer centers to get update on patient transfer to hospital in network with [email or web address]]. [facility] has denied patient. Dr. Elizabeth Cardenas would prefer [facility]. U transfer center has spoken to Dr. Elizabeth Cardenas who has referred patient to Dr. Sejal Lorenzo transfer center - [phone] - is working with both physicians to see if patient can be transferred as direct admit to [address] [place] [address] as [facility] is currently on diversion. U transfer center to return call to this CM. No beds available today. CM will need to call daily for bed availability.     Sofya Martinez, RN, BSN, ACM   - Medical Oncology  [phone]

## 2018-09-12 NOTE — PROGRESS NOTES
Nutrition Assessment:    INTERVENTIONS/RECOMMENDATIONS:   Trial of continuous TF x 24 hrs  · TwoCal @ 20 ml/hr increasing by 10 ml q 8 hrs to goal of 50 ml/hr + 150 ml water flush q 3 hrs  · Provides: 2400 kcal, 100 g pro and 2040 ml free water  · If current TF bottle has been hanging for >24 hrs please discard and start new bottle    ASSESSMENT:   Chart reviewed, pt to be transferred to [facility]. Pt has had minimal TF administered since admission. Pt refusing TF due to fear of vomiting which irritates his throat. Not taking anything PO. He is administering water via PEG. He agreed to try continuous TF. Diet Order: Clear liquids  % Eaten:  Patient Vitals for the past 72 hrs:   % Diet Eaten   09/10/18 1607 0 %     Pertinent Medications: [x]Reviewed: NS @ 125, PPI  Pertinent Labs: [x]Reviewed:   Food Allergies: [x]NKFA  []Other   Last BM:    Edema:      []RUE   []LUE   []RLE   []LLE      Pressure Ulcer:      [] Stage I   [] Stage II   [] Stage III   [] Stage IV      Anthropometrics: Height:   Weight: 123.7 kg (272 lb 11.3 oz)    IBW (%IBW):   ( ) UBW (%UBW):   (  %)    BMI: Body mass index is 40.27 kg/(m^2). This BMI is indicative of:  []Underweight   []Normal   []Overweight   [] Obesity   [x] Extreme Obesity (BMI>40)  Last Weight Metrics:  Weight Loss Metrics 9/11/2018 8/29/2018 7/19/2018 6/17/2018 5/30/2018 4/24/2018 4/2/2018   Today's Wt 272 lb 11.3 oz 286 lb 8 oz 313 lb 8 oz 343 lb 343 lb 333 lb 335 lb   BMI 40.27 kg/m2 42.31 kg/m2 46.3 kg/m2 50.65 kg/m2 50.65 kg/m2 49.18 kg/m2 49.47 kg/m2       Estimated Nutrition Needs (Based on): 4033 Kcals/day (MSJL: 2180 x 1.2) , 105 g (0.8 g/kg) Protein  Carbohydrate:  At Least 130 g/day  Fluids: 2615 mL/day or per primary team    NUTRITION DIAGNOSES:   Problem:  Less than optimal enteral nutrition      Etiology: related to pt refusing TF     Signs/Symptoms: as evidenced by minimal TF intake since admission    Previous Nutrition Dx:  [] Resolved  [] Unresolved           [x] Progressing    NUTRITION INTERVENTIONS:  Meals/Snacks: General/healthful diet Enteral/Parenteral Nutrition: Initiate enteral nutrition Supplements: Commercial supplement              GOAL:   initiate TF in 24-48 hrs    NUTRITION MONITORING AND EVALUATION      Food/Nutrient Intake Outcomes:  Total energy intake, Enteral/parenteral nutrition intake  Physical Signs/Symptoms Outcomes: Weight/weight change, Electrolyte and renal profile, GI, Glucose profile    Previous Goal Met:   [] Met              [] Progressing Towards Goal              [x] Not Progressing Towards Goal   Previous Recommendations:   [x] Implemented          [] Not Implemented          [] Not Applicable    LEARNING NEEDS (Diet, Food/Nutrient-Drug Interaction):    [] None Identified   [x] Identified and Education Provided/Documented   [] Identified and Pt declined/was not appropriate     Cultural, Anabaptism, OR Ethnic Dietary Needs:    [x] None Identified   [] Identified and Addressed     [x] Interdisciplinary Care Plan Reviewed/Documented    [x] Discharge Planning: Continue home TF regimen    [] Participated in Interdisciplinary Rounds    NUTRITION RISK:    [x] High              [] Moderate           []  Low  []  Minimal/Uncompromised      Brenda Landeros RDN  Pager [phone]  Weekend Pager [phone]

## 2018-09-12 NOTE — PROGRESS NOTES
Oncology Nursing Communication Tool  7:19 AM  9/12/2018     Bedside and Verbal shift change report given to Micah Barnes RN (incoming nurse) by Alesha Root CNA (outgoing nurse) on [address] [facility] [address]. Report included the following information SBAR, Kardex and MAR. Shift Summary: pt has pain and nausea treated with meds. Suction was changed and labs were drawn      Issues for physician to address: transport        Oncology Shift Note   Admission Date 9/10/2018   Admission Diagnosis nausea, vomiting, dehydration  Intractable nausea and vomiting   Code Status Full Code   Consults IP CONSULT TO RADIATION ONCOLOGY      Cardiac Monitoring [] Yes [x] No      Purposeful Hourly Rounding [x] Yes    Jason Score Total Score: 1   Jason score 3 or > [] Bed Alarm [] Avasys [] 1:1 sitter [] Patient refused (Place signed refusal form in chart)      Pain Managed [x] Yes [] No    Key Pain Meds             oxyCODONE-acetaminophen (PERCOCET) 5-325 mg per tablet  (Taking) Take 1 Tab by mouth every four (4) hours as needed for Pain. Max Daily Amount: 6 Tabs. Influenza Vaccine Received Flu Vaccine for Current Season (usually Sept-March): Not Flu Season           Oxygen needs?  [x] Room air Oxygen @  []1L    []2L    []3L   []4L    []5L   []6L     Use home O2? [] Yes [] No  Perform O2 challenge test using  smartphrase (.oxygenchallenge)      Last bowel movement    bowel movement      Urinary Catheter             LDAs             Venous Access Device right chest port 09/10/18 Upper chest (subclavicular area, right (Active)   Central Line Being Utilized Yes 9/12/2018  3:17 AM   Criteria for Appropriate Use Limited/no vessel suitable for conventional peripheral access 9/12/2018  3:17 AM   Site Assessment Clean, dry, & intact 9/12/2018  3:17 AM   Dressing Status Clean, dry, & intact 9/12/2018  3:17 AM            PEG/Gastrostomy Tube 09/10/18 (Active)   Site Assessment Clean, dry, & intact 9/12/2018  3:17 AM   Dressing Status Clean, dry, & intact 9/12/2018  3:17 AM   G Port Status Clamped 9/12/2018  3:17 AM                   Readmission Risk Assessment Tool Score Low Risk            3       Total Score        3 Has Seen PCP in Last 6 Months (Yes=3, No=0)        Criteria that do not apply:    . Living with Significant Other. Assisted Living. LTAC. SNF. or   Rehab    Patient Length of Stay (>5 days = 3)    IP Visits Last 12 Months (1-3=4, 4=9, >4=11)    Pt.  Coverage (Medicare=5 , Medicaid, or Self-Pay=4)    Charlson Comorbidity Score (Age + Comorbid Conditions)       Expected Length of Stay 3d 0h   Actual Length of Stay [address] [facility]

## 2018-09-12 NOTE — PROGRESS NOTES
[place] [facility] [place] there as well as transfer center. They are on diversion.  They also do not keep waiting list. Will need to call daily for bed availability

## 2018-09-12 NOTE — PROGRESS NOTES
Oncology Nursing Communication Tool  7:12 PM  9/12/2018     Bedside shift change report given to ROCIO Serrano (incoming nurse) by Adrian Madrigal (outgoing nurse) on [address]. Report included the following information SBAR, Kardex, MAR and Recent Results. Shift Summary:       Issues for physician to address: Oncology Shift Note   Admission Date 9/10/2018   Admission Diagnosis nausea, vomiting, dehydration  Intractable nausea and vomiting   Code Status Full Code   Consults IP CONSULT TO RADIATION ONCOLOGY      Cardiac Monitoring [] Yes [] No      Purposeful Hourly Rounding [] Yes    Jason Score Total Score: 1   Jason score 3 or > [] Bed Alarm [] Avasys [] 1:1 sitter [] Patient refused (Place signed refusal form in chart)      Pain Managed [] Yes [] No    Key Pain Meds             oxyCODONE-acetaminophen (PERCOCET) 5-325 mg per tablet  (Taking) Take 1 Tab by mouth every four (4) hours as needed for Pain. Max Daily Amount: 6 Tabs. Influenza Vaccine Received Flu Vaccine for Current Season (usually Sept-March): Not Flu Season           Oxygen needs? [] Room air Oxygen @  []1L    []2L    []3L   []4L    []5L   []6L     Use home O2? [] Yes [] No  Perform O2 challenge test using  smartphrase (.oxygenchallenge)      Last bowel movement    bowel movement      Urinary Catheter             LDAs             Venous Access Device right chest port 09/10/18 Upper chest (subclavicular area, right (Active)   Central Line Being Utilized Yes 9/12/2018  4:20 PM   Criteria for Appropriate Use Limited/no vessel suitable for conventional peripheral access 9/12/2018  4:20 PM   Site Assessment Clean, dry, & intact 9/12/2018  4:20 PM   Date of Last Dressing Change 09/12/18 9/12/2018  4:20 PM   Dressing Status Clean, dry, & intact 9/12/2018  4:20 PM   Dressing Type Disk with Chlorhexadine gluconate (CHG); Transparent 9/12/2018  4:20 PM   Action Taken Dressing changed 9/12/2018  4:20 PM   Positive Blood Return (Medial Site) Yes 9/12/2018  4:20 PM            PEG/Gastrostomy Tube 09/10/18 (Active)   Site Assessment Clean, dry, & intact 9/12/2018  4:20 PM   Dressing Status Clean, dry, & intact 9/12/2018  4:20 PM   G Port Status Clamped 9/12/2018  3:17 AM   Action Taken Retaped 9/12/2018  4:20 PM                   Readmission Risk Assessment Tool Score Low Risk            3       Total Score        3 Has Seen PCP in Last 6 Months (Yes=3, No=0)        Criteria that do not apply:    . Living with Significant Other. Assisted Living. LTAC. SNF. or   Rehab    Patient Length of Stay (>5 days = 3)    IP Visits Last 12 Months (1-3=4, 4=9, >4=11)    Pt.  Coverage (Medicare=5 , Medicaid, or Self-Pay=4)    Charlson Comorbidity Score (Age + Comorbid Conditions)       Expected Length of Stay 3d 0h   Actual Length of Stay 2          Elsa Krabbe

## 2018-09-12 NOTE — PROGRESS NOTES
Problem: Mobility Impaired (Adult and Pediatric)  Goal: *Acute Goals and Plan of Care (Insert Text)  Physical Therapy Goals  Initiated 9/10/2018  1. Patient will move from supine to sit and sit to supine  in bed with independence within 7 day(s). 2.  Patient will transfer from bed to chair and chair to bed with independence using the least restrictive device within 7 day(s). 3.  Patient will perform sit to stand with independence within 7 day(s). 4.  Patient will ambulate with independence for 500 feet with the least restrictive device within 7 day(s). 5.  Patient will ascend/descend 3 stairs with 1 handrail(s) with supervision/set-up within 7 day(s). physical Therapy TREATMENT  Patient: Rosi Hay (43 y.o. male)  Date: 9/12/2018  Diagnosis: Intractable nausea, vomiting, dehydration        Precautions:  falls  Chart, physical therapy assessment, plan of care and goals were reviewed. ASSESSMENT: pt tolerated tx well, 1-2 LOB with no AD, once on RW he had no LOB, no SOB, does well with bed mob and transfers, very motivated, vc's for safety and proper RW use. Progression toward goals:  []    Improving appropriately and progressing toward goals  [x]    Improving slowly and progressing toward goals  []    Not making progress toward goals and plan of care will be adjusted     PLAN:  Patient continues to benefit from skilled intervention to address the above impairments. Continue treatment per established plan of care.   Discharge Recommendations:  Home Health  Further Equipment Recommendations for Discharge:  rolling walker     OBJECTIVE DATA SUMMARY:     Critical Behavior:  Neurologic State: Alert  Orientation Level: Oriented X4    Functional Mobility Training:  Bed Mobility:  Rolling: Modified independent  Supine to Sit: Modified independent  Sit to Supine: Modified independent  Scooting: Modified independent  Level of Assistance: Modified independent  Interventions: Verbal cues     Transfers:  Sit to Stand: Supervision  Stand to Sit: Supervision  Bed to Chair:  (no chair)  Interventions: Verbal cues  Level of Assistance: Supervision   \  Balance:  Sitting: Intact; Without support  Standing: Impaired; Without support  Standing - Static: Fair;Constant support  Standing - Dynamic : Fair     Ambulation/Gait Training:  Distance (ft): 300 Feet (ft)  Assistive Device: Gait belt;Walker, rolling  Ambulation - Level of Assistance: Contact guard assistance  Gait Abnormalities: Decreased step clearance  Right Side Weight Bearing: Full  Left Side Weight Bearing: Full  Base of Support: Widened  Stance:  (equal)  Speed/Elaine: Pace decreased (<100 feet/min)  Step Length: Left shortened;Right shortened  Interventions: Verbal cues; Tactile cues     Pain:  Pain Scale 1: Numeric (0 - 10)  Pain Intensity 1: 0  Pain Location 1: Mouth; Throat  Pain Description 1: Aching  Pain Intervention(s) 1: Medication (see MAR)     Activity Tolerance: good    After treatment:   []    Patient left in no apparent distress sitting up in chair  [x]    Patient left in no apparent distress in bed (no chair)  [x]    Call bell left within reach  [x]    Nursing notified  []    Caregiver present  []    Bed alarm activated    COMMUNICATION/COLLABORATION:   The patients plan of care was discussed with: Registered Nurse    Abbey Triana PTA   Time Calculation: 25 mins

## 2018-09-12 NOTE — PROGRESS NOTES
Oncology Interdisciplinary rounds were held today to discuss patient plan of care and outcomes. The following members were present: Nursing, Case Management, Pharmacy and Dietary.     Actual Length of Stay: 2    DRG GLOS: 3    Expected Length of Stay: 3d 0h                       Plan               Mobility         Discharge Plan   Complete daily radiation until pt is transferred to [facility]  Continue to monitor nausea Up with assistance x1   Daily check for bed for transfer to [facility], awaitng for transfer

## 2018-09-13 LAB
ANION GAP SERPL CALC-SCNC: 12 MMOL/L (ref 5–15)
BUN SERPL-MCNC: 29 MG/DL (ref 6–20)
BUN/CREAT SERPL: 13 (ref 12–20)
CALCIUM SERPL-MCNC: 8 MG/DL (ref 8.5–10.1)
CHLORIDE SERPL-SCNC: 96 MMOL/L (ref 97–108)
CO2 SERPL-SCNC: 26 MMOL/L (ref 21–32)
CREAT SERPL-MCNC: 2.27 MG/DL (ref 0.7–1.3)
GLUCOSE SERPL-MCNC: 86 MG/DL (ref 65–100)
MAGNESIUM SERPL-MCNC: 1.3 MG/DL (ref 1.6–2.4)
POTASSIUM SERPL-SCNC: 3.7 MMOL/L (ref 3.5–5.1)
SODIUM SERPL-SCNC: 134 MMOL/L (ref 136–145)

## 2018-09-13 PROCEDURE — 77412 RADIATION TX DELIVERY LVL 3: CPT

## 2018-09-13 PROCEDURE — 92526 ORAL FUNCTION THERAPY: CPT | Performed by: SPEECH-LANGUAGE PATHOLOGIST

## 2018-09-13 PROCEDURE — 36415 COLL VENOUS BLD VENIPUNCTURE: CPT | Performed by: INTERNAL MEDICINE

## 2018-09-13 PROCEDURE — 74011250637 HC RX REV CODE- 250/637: Performed by: INTERNAL MEDICINE

## 2018-09-13 PROCEDURE — 80048 BASIC METABOLIC PNL TOTAL CA: CPT | Performed by: INTERNAL MEDICINE

## 2018-09-13 PROCEDURE — 74011250636 HC RX REV CODE- 250/636: Performed by: INTERNAL MEDICINE

## 2018-09-13 PROCEDURE — C9113 INJ PANTOPRAZOLE SODIUM, VIA: HCPCS | Performed by: INTERNAL MEDICINE

## 2018-09-13 PROCEDURE — 83735 ASSAY OF MAGNESIUM: CPT | Performed by: INTERNAL MEDICINE

## 2018-09-13 PROCEDURE — 97116 GAIT TRAINING THERAPY: CPT

## 2018-09-13 PROCEDURE — 97530 THERAPEUTIC ACTIVITIES: CPT

## 2018-09-13 PROCEDURE — 74011000250 HC RX REV CODE- 250: Performed by: INTERNAL MEDICINE

## 2018-09-13 PROCEDURE — 65270000015 HC RM PRIVATE ONCOLOGY

## 2018-09-13 RX ORDER — MORPHINE SULFATE 10 MG/ML
3 INJECTION, SOLUTION INTRAMUSCULAR; INTRAVENOUS
Status: DISCONTINUED | OUTPATIENT
Start: 2018-09-13 | End: 2018-09-20

## 2018-09-13 RX ORDER — MAGNESIUM SULFATE HEPTAHYDRATE 40 MG/ML
2 INJECTION, SOLUTION INTRAVENOUS ONCE
Status: COMPLETED | OUTPATIENT
Start: 2018-09-13 | End: 2018-09-16

## 2018-09-13 RX ADMIN — MORPHINE SULFATE 3 MG: 10 INJECTION INTRAVENOUS at 17:05

## 2018-09-13 RX ADMIN — ONDANSETRON 8 MG: 2 INJECTION INTRAMUSCULAR; INTRAVENOUS at 02:01

## 2018-09-13 RX ADMIN — MORPHINE SULFATE 3 MG: 10 INJECTION INTRAVENOUS at 21:20

## 2018-09-13 RX ADMIN — MORPHINE SULFATE 2 MG: 10 INJECTION INTRAVENOUS at 08:37

## 2018-09-13 RX ADMIN — LIDOCAINE HYDROCHLORIDE 15 ML: 20 SOLUTION ORAL; TOPICAL at 13:11

## 2018-09-13 RX ADMIN — ONDANSETRON 8 MG: 2 INJECTION INTRAMUSCULAR; INTRAVENOUS at 21:20

## 2018-09-13 RX ADMIN — MAGNESIUM SULFATE IN WATER 2 G: 40 INJECTION, SOLUTION INTRAVENOUS at 14:43

## 2018-09-13 RX ADMIN — OXYCODONE HYDROCHLORIDE 15 MG: 5 TABLET ORAL at 11:04

## 2018-09-13 RX ADMIN — NYSTATIN 500000 UNITS: 100000 SUSPENSION ORAL at 22:42

## 2018-09-13 RX ADMIN — GABAPENTIN 300 MG: 250 SUSPENSION ORAL at 17:08

## 2018-09-13 RX ADMIN — POLYETHYLENE GLYCOL 3350 17 G: 17 POWDER, FOR SOLUTION ORAL at 11:13

## 2018-09-13 RX ADMIN — PANTOPRAZOLE SODIUM 40 MG: 40 INJECTION, POWDER, FOR SOLUTION INTRAVENOUS at 17:05

## 2018-09-13 RX ADMIN — ENOXAPARIN SODIUM 40 MG: 40 INJECTION, SOLUTION INTRAVENOUS; SUBCUTANEOUS at 11:02

## 2018-09-13 RX ADMIN — NYSTATIN 500000 UNITS: 100000 SUSPENSION ORAL at 17:05

## 2018-09-13 RX ADMIN — GABAPENTIN 300 MG: 250 SUSPENSION ORAL at 00:54

## 2018-09-13 RX ADMIN — GABAPENTIN 300 MG: 250 SUSPENSION ORAL at 11:02

## 2018-09-13 RX ADMIN — MORPHINE SULFATE 2 MG: 10 INJECTION INTRAVENOUS at 02:01

## 2018-09-13 RX ADMIN — ONDANSETRON 8 MG: 2 INJECTION INTRAMUSCULAR; INTRAVENOUS at 13:12

## 2018-09-13 RX ADMIN — NYSTATIN 500000 UNITS: 100000 SUSPENSION ORAL at 11:03

## 2018-09-13 RX ADMIN — LIDOCAINE HYDROCHLORIDE 15 ML: 20 SOLUTION ORAL; TOPICAL at 19:15

## 2018-09-13 RX ADMIN — SODIUM CHLORIDE 125 ML/HR: 900 INJECTION, SOLUTION INTRAVENOUS at 19:47

## 2018-09-13 RX ADMIN — ENOXAPARIN SODIUM 40 MG: 40 INJECTION, SOLUTION INTRAVENOUS; SUBCUTANEOUS at 21:19

## 2018-09-13 NOTE — PROGRESS NOTES
Problem: Dysphagia (Adult)  Goal: *Speech Goal: (INSERT TEXT)  1. Patient will tolerate puree diet with thin liquids without signs/symptoms of aspiration given no cues within 7 day(s). Speech language pathology dysphagia treatment  Patient: Lety Pickens (43 y.o. male)  Date: 9/13/2018  Diagnosis: nausea, vomiting, dehydration  Intractable nausea and vomiting Intractable nausea and vomiting       Precautions:       ASSESSMENT:  Helent seen in follow up after radiation tx. He was interested in trying popsicles, but declined 2 flavors after a few bites because \"my taste buds are gone. \" Swallow with this small amount of liquid appeared less painful than dry swallow. Patient still with intense pain with swallow. Patient pending transfer to another facility: Educated patient to request SLP follow up once he is transferred: I want to make sure he is set up with OP tx after this stay. Progression toward goals:  [x]         Improving appropriately and progressing toward goals  []         Improving slowly and progressing toward goals  []         Not making progress toward goals and plan of care will be adjusted     PLAN:  Recommendations and Planned Interventions:  1. Continue clear liquids: could also do full liquids if this is possible for his nausea. 2. Tube feeds for main nutrition  3. Continues SLP tx to address swallowing as tx progresses and prevent disuse atrophy  Patient continues to benefit from skilled intervention to address the above impairments. Continue treatment per established plan of care. Discharge Recommendations: To another acute hospital, then likely to OP or HH     SUBJECTIVE:   Patient stated I can taste the orange but not the sweet.     OBJECTIVE:   Cognitive and Communication Status:  Neurologic State: Alert  Orientation Level: Oriented X4     Perception: Appears intact  Perseveration: No perseveration noted     Dysphagia Treatment:  Oral Assessment:  Oral Assessment  Labial: No impairment  Dentition: Full; Intact  Lingual: Decreased strength  Mandible:  (DECREASE JAW OPENING)  P.O. Trials:     Vocal quality prior to P.O.: Low volume  Consistency Presented:  (FROZEN THIN LIQUID POPSICE)                 Propulsion: Delayed (# of seconds) (SLOW BECAUSE PATINET IS FEARFUL OF PAIN)  Oral Residue: None     Laryngeal Elevation: Functional  Aspiration Signs/Symptoms:  (DELAYED COUGH LATER AS WE WERE TALING, DID NOT APPEAR RELATED, APTIETN COUGHED UP PHLEGM)  Pharyngeal Phase Characteristics: Painful swallow             Oral Phase Severity: Mild  Pharyngeal Phase Severity : Moderate                               Pain:  Pain Scale 1: Numeric (0 - 10)  Pain Intensity 1: 8  Pain Location 1: Throat  After treatment:   []              Patient left in no apparent distress sitting up in chair  [x]              Patient left in no apparent distress in bed  [x]              Call bell left within reach  []              Nursing notified  [x]              Caregiver present  []              Bed alarm activated    COMMUNICATION/EDUCATION:   Patient was educated regarding His deficit(s) of dysphagia as this relates to His diagnosis of tonsillar CA. He demonstrated Fair understanding as evidenced by alert and particiaptes but distracted pain feeling poorly. The patients plan of care including recommendations, planned interventions, and recommended diet changes were discussed with: Registered Nurse. []              Posted safety precautions in patient's room.     ES Lay  Time Calculation: 10 mins

## 2018-09-13 NOTE — PROGRESS NOTES
Oncology Nursing Communication Tool  7:24 PM  9/13/2018     \"Bedside\" shift change report given to Forrest Bustillo RN (incoming nurse) by Jose Enrique Swift (outgoing nurse) on [facility]. Report included the following information        Shift Summary: radiation 2/13 today, pain management, up to shower, awaiting room at [address] [place] [address] for physician to address: n/a       Oncology Shift Note   Admission Date 9/10/2018   Admission Diagnosis nausea, vomiting, dehydration  Intractable nausea and vomiting   Code Status Full Code   Consults IP CONSULT TO RADIATION ONCOLOGY      Cardiac Monitoring [] Yes [] No      Purposeful Hourly Rounding [] Yes    Jason Score Total Score: 1   Jason score 3 or > [] Bed Alarm [] Avasys [] 1:1 sitter [] Patient refused (Place signed refusal form in chart)      Pain Managed [] Yes [] No    Key Pain Meds             oxyCODONE-acetaminophen (PERCOCET) 5-325 mg per tablet  (Taking) Take 1 Tab by mouth every four (4) hours as needed for Pain. Max Daily Amount: 6 Tabs. Influenza Vaccine Received Flu Vaccine for Current Season (usually Sept-March): Not Flu Season           Oxygen needs?  [] Room air Oxygen @  []1L    []2L    []3L   []4L    []5L   []6L     Use home O2? [] Yes [] No  Perform O2 challenge test using  smartphrase (.oxygenchallenge)      Last bowel movement Last Bowel Movement Date:  (patient cant recall)  bowel movement      Urinary Catheter             LDAs             Venous Access Device right chest port 09/10/18 Upper chest (subclavicular area, right (Active)   Central Line Being Utilized Yes 9/13/2018  3:38 PM   Criteria for Appropriate Use Limited/no vessel suitable for conventional peripheral access 9/13/2018  3:38 PM   Site Assessment Clean, dry, & intact 9/13/2018  3:38 PM   Date of Last Dressing Change 09/12/18 9/13/2018  3:03 AM   Dressing Status Clean, dry, & intact 9/13/2018  3:38 PM   Dressing Type Transparent;Tape;Disk with Chlorhexadine gluconate (CHG) 9/13/2018  3:38 PM   Action Taken Open ports on tubing capped 9/13/2018  3:03 AM   Positive Blood Return (Medial Site) Yes 9/13/2018  3:38 PM            PEG/Gastrostomy Tube 09/10/18 (Active)   Site Assessment Clean, dry, & intact 9/13/2018  5:21 PM   Dressing Status Clean, dry, & intact; Other (comment) 9/13/2018  5:21 PM   G Port Status Infusing 9/13/2018  5:21 PM   Action Taken Retaped 9/12/2018  4:20 PM   Tube Feeding/Formula Options Twocal HN 9/13/2018  3:03 AM   Tube Feeding/Verify Rate (mL/hr) 30 9/13/2018  3:03 AM                   Readmission Risk Assessment Tool Score Low Risk            3       Total Score        3 Has Seen PCP in Last 6 Months (Yes=3, No=0)        Criteria that do not apply:    . Living with Significant Other. Assisted Living. LTAC. SNF. or   Rehab    Patient Length of Stay (>5 days = 3)    IP Visits Last 12 Months (1-3=4, 4=9, >4=11)    Pt.  Coverage (Medicare=5 , Medicaid, or Self-Pay=4)    Charlson Comorbidity Score (Age + Comorbid Conditions)       Expected Length of Stay 3d 0h   Actual Length of Stay [address]

## 2018-09-13 NOTE — PROGRESS NOTES
Came to see patient. He is off the floor having radiation. Per RN and chart he has been eating little or no PO and spitting saliva often. Expect this may not improve today as still receiving radiation. Will follow back as able. Priorityfor the future is that patient should have follow up at next level of care, likely OP SLP tx, to continue swallow rehab, prevent disuse atrophy as his treatment plan continues. Even with PEG for nutrition, swallowing some PO to prevent the above should be a priority once medically able. PROVIDER, PLEASE ORDER SLP [address].      Ollie Pierre M.S., CCC-SLP

## 2018-09-13 NOTE — PROGRESS NOTES
CM completed chart review. CM called to [facility] transfer center and Inpatient Restrictions for transfers are still in place at [facility] and they are unable take Pt this Pt at this time. CM talked to and she indicated that there is a transfer list that the Pt needs to be on. CM gave the information to transfer center. They will talk to Dr. Ana Laura Diaz with Palliative Care and call Dr. Macky Gaucher to discuss. NO BEDS available today. CM will need to call back daily for bed availability to [phone] for this Pt. CM will continue to monitor discharge plan.        Ronaldo Fall

## 2018-09-13 NOTE — PROGRESS NOTES
Hematology Oncology Progress Note       Follow up for: head and neck cancer (left tonsil)    Chart notes reviewed since last visit. Case discussed with following: nursing staff. Dr. Daysi Alcantara indicated MCV is still on idversion. Patient complains of the following: severe soreness of mouth persists with blood tinged mucous on occasion. One episode of emesis on return from XRT today. Additional concerns noted by the staff:     Patient Vitals for the past 24 hrs:   BP Temp Pulse Resp SpO2   09/13/18 1013 149/77 98.4 °F (36.9 °C) 80 20 100 %   09/13/18 0754 148/85 98.8 °F (37.1 °C) 80 18 99 %   09/13/18 0111 147/81 98.9 °F (37.2 °C) 84 18 100 %   09/12/18 1932 152/84 98.8 °F (37.1 °C) 84 18 100 %   09/12/18 1810 - 98.1 °F (36.7 °C) - - -   09/12/18 1553 132/76 98.7 °F (37.1 °C) 80 18 99 %       Review of Systems:  Constitutional  fatigue weight loss. mild distress secondary to pain with swallowing. Allergic/Immunologic No recent allergic reactions   Eyes No significant visual difficulties. No diplopia. ENMT sore throat   Endocrine No hot flashes or night sweats. No cold intolerance, polyuria, or polydipsia   Hematologic/Lymphatic No easy bruising or bleeding. The patient denies any tender or palpable lymph nodes   Breasts No abnormal masses of breast, nipple discharge or pain. Respiratory No dyspnea on exertion, orthopnea, chest pain, cough or hemoptysis. Cardiovascular No anginal chest pain, irregular heart beat, tachycardia, palpitations or orthopnea. Gastrointestinal  nausea,  dysphagia   Genitourinary (M) No hematuria, dysuria, increased frequency, urgency, hesitancy or incontinence. Musculoskeletal No joint pain, swelling or redness. No decreased range of motion. Integumentary No chronic rashes, inflammation, ulcerations, pruritus, petechiae, purpura, ecchymoses, or skin changes.    Neurologic No headache, blurred vision, and no areas of focal weakness or numbness. Psychiatric No insomnia, depression, dayne or mood swings. Physical Examination:  Constitutional Alert, cooperative, oriented. Mood and affect appropriate. Appears close to chronological age. Well nourished. Well developed. Head Normocephalic; no scars   Eyes Conjunctivae and sclerae are clear and without icterus. Pupils are round   ENMT Sinuses are nontender. Posterior mucositis not well visualized due to limited ability to open mouth fully - appears inflammed. Copious mucous    Neck Supple without masses or thyromegaly. No jugular venous distension. Hematologic/Lymphatic No petechiae or purpura. No tender or palpable lymph nodes noted   Respiratory Lungs are clear to auscultation without rhonchi or wheezing. Cardiovascular Regular rate and rhythm of heart    Chest / Line Site Chest is symmetric with no chest wall deformities. Abdomen Non-tender, non-distended, no masses, ascites or hepatosplenomegaly. Good bowel sounds. Musculoskeletal No tenderness or swelling, normal range of motion without obvious weakness. Extremities No visible deformities, no cyanosis, clubbing or edema. Skin No rashes, scars, or lesions suggestive of malignancy. No petechiae, purpura, or ecchymoses. No excoriations. Neurologic No sensory or motor deficits noted grossly. Psychiatric Alert and oriented. Coherent speech. Verbalizes understanding of our discussions today.        Labs:  Recent Results (from the past 24 hour(s))   MAGNESIUM    Collection Time: 09/13/18  3:34 AM   Result Value Ref Range    Magnesium 1.3 (L) 1.6 - 2.4 mg/dL   METABOLIC PANEL, BASIC    Collection Time: 09/13/18  3:34 AM   Result Value Ref Range    Sodium 134 (L) 136 - 145 mmol/L    Potassium 3.7 3.5 - 5.1 mmol/L    Chloride 96 (L) 97 - 108 mmol/L    CO2 26 21 - 32 mmol/L    Anion gap 12 5 - 15 mmol/L    Glucose 86 65 - 100 mg/dL    BUN 29 (H) 6 - 20 MG/DL    Creatinine 2.27 (H) 0.70 - 1.30 MG/DL    BUN/Creatinine ratio 13 12 - 20      GFR est AA 39 (L) >60 ml/min/1.73m2    GFR est non-AA 32 (L) >60 ml/min/1.73m2    Calcium 8.0 (L) 8.5 - 10.1 MG/DL       Assessment and Plan:   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 PEG tube feedings as tolerated. Symptomatic meds - magic mouthwash, viscous lidocaine, morphine as tolerated. Hypokalemia, hypomagnesemia - earlier. K corrected but magnesium still low today so will supplement again. Acute renal failure - creatinine improving with hydration. Will continue at 125 ml/hr  since po intake remains minimal.     [pain control - doing better with  Morphine but will increase dose and frequency to better meet needs. , magic mouthwash, viscous lidocaine.      Nausea - on zofran

## 2018-09-13 NOTE — PROGRESS NOTES
Oncology Interdisciplinary rounds were held today to discuss patient plan of care and outcomes. The following members were present: Nursing, Case Management, Pharmacy and Dietary.     Actual Length of Stay: 3    DRG GLOS: 3    Expected Length of Stay: 3d 0h                       Plan               Mobility         Discharge Plan   Radiation day 3 of 13 Up with assist x1 [facility]

## 2018-09-13 NOTE — PROGRESS NOTES
Patient stated pain level was 8 on the numeric scale RN notified to reassess pain level within the hour.

## 2018-09-13 NOTE — PROGRESS NOTES
Problem: Mobility Impaired (Adult and Pediatric)  Goal: *Acute Goals and Plan of Care (Insert Text)  Physical Therapy Goals  Initiated 9/10/2018  1. Patient will move from supine to sit and sit to supine  in bed with independence within 7 day(s). 2.  Patient will transfer from bed to chair and chair to bed with independence using the least restrictive device within 7 day(s). 3.  Patient will perform sit to stand with independence within 7 day(s). 4.  Patient will ambulate with independence for 500 feet with the least restrictive device within 7 day(s). 5.  Patient will ascend/descend 3 stairs with 1 handrail(s) with supervision/set-up within 7 day(s). physical Therapy TREATMENT  Patient: Ulices Hebert (43 y.o. male)  Date: 9/13/2018  Diagnosis: Intractable nausea, vomiting, dehydration     Precautions:    Chart, physical therapy assessment, plan of care and goals were reviewed. ASSESSMENT: pt progressing well towards goals, no LOB or SOB, acquired chair so pt could be out of bed, does well with bed mob and transfers, good motivation, vc's for safety and proper RW use. Progression toward goals:  [x]    Improving appropriately and progressing toward goals  []    Improving slowly and progressing toward goals  []    Not making progress toward goals and plan of care will be adjusted     PLAN:  Patient continues to benefit from skilled intervention to address the above impairments. Continue treatment per established plan of care.   Discharge Recommendations:  Home Health  Further Equipment Recommendations for Discharge:  rolling walker, possibly none pending progress     OBJECTIVE DATA SUMMARY:     Critical Behavior:  Neurologic State: Alert  Orientation Level: Oriented X4     Functional Mobility Training:  Bed Mobility:  Rolling: Modified independent  Supine to Sit: Modified independent  Sit to Supine: Modified independent  Scooting: Modified independent  Level of Assistance: Modified independent  Interventions: Verbal cues     Transfers:  Sit to Stand: Supervision  Stand to Sit: Supervision  Stand Pivot Transfers: Stand-by assistance  Bed to Chair: Stand-by assistance  Interventions: Verbal cues  Level of Assistance: Stand-by assistance     Balance:  Sitting: Intact; Without support  Standing: Intact; With support  Standing - Static: Good;Constant support  Standing - Dynamic : Good     Ambulation/Gait Training:  Distance (ft): 300 Feet (ft)  Assistive Device: Gait belt;Walker, rolling  Ambulation - Level of Assistance: Contact guard assistance  Gait Abnormalities: Decreased step clearance  Right Side Weight Bearing: Full  Left Side Weight Bearing: Full  Base of Support: Widened  Stance:  (equal)  Speed/Elaine: Pace decreased (<100 feet/min)  Step Length: Left shortened;Right shortened  Interventions: Tactile cues; Verbal cues    Pain:  Pain Scale 1: Numeric (0 - 10)  Pain Intensity 1: 8  Pain Location 1: Throat  Pain Orientation 1: Inner  Pain Description 1: Aching; Sore  Pain Intervention(s) 1: Medication (see MAR)     Activity Tolerance: good    After treatment:   [x]    Patient left in no apparent distress sitting up in chair  []    Patient left in no apparent distress in bed  [x]    Call bell left within reach  [x]    Nursing notified  []    Caregiver present  []    Bed alarm activated    COMMUNICATION/COLLABORATION:   The patients plan of care was discussed with: Registered Nurse    Vanessa Rosen PTA   Time Calculation: 25 mins

## 2018-09-13 NOTE — PROGRESS NOTES
Oncology Nursing Communication Tool  6:39 AM  9/13/2018     Bedside shift change report given to Lara Yates RN (incoming nurse) by Luis Felipe Kellogg CNA (outgoing nurse) on [address] [facility]. Report included the following information SBAR, Kardex and MAR. Shift Summary: Pt had pain in throat, pain meds given      Issues for physician to address: Transfer       Oncology Shift Note   Admission Date 9/10/2018   Admission Diagnosis nausea, vomiting, dehydration  Intractable nausea and vomiting   Code Status Full Code   Consults IP CONSULT TO RADIATION ONCOLOGY      Cardiac Monitoring [] Yes [x] No      Purposeful Hourly Rounding [x] Yes    Jason Score Total Score: 1   Jason score 3 or > [] Bed Alarm [] Avasys [] 1:1 sitter [] Patient refused (Place signed refusal form in chart)      Pain Managed [x] Yes [] No    Key Pain Meds             oxyCODONE-acetaminophen (PERCOCET) 5-325 mg per tablet  (Taking) Take 1 Tab by mouth every four (4) hours as needed for Pain. Max Daily Amount: 6 Tabs. Influenza Vaccine Received Flu Vaccine for Current Season (usually Sept-March): Not Flu Season           Oxygen needs?  [x] Room air Oxygen @  []1L    []2L    []3L   []4L    []5L   []6L     Use home O2? [] Yes [] No  Perform O2 challenge test using  smartphrase (.oxygenchallenge)      Last bowel movement    bowel movement      Urinary Catheter             LDAs             Venous Access Device right chest port 09/10/18 Upper chest (subclavicular area, right (Active)   Central Line Being Utilized Yes 9/13/2018  3:03 AM   Criteria for Appropriate Use Limited/no vessel suitable for conventional peripheral access 9/13/2018  3:03 AM   Site Assessment Clean, dry, & intact 9/13/2018  3:03 AM   Date of Last Dressing Change 09/12/18 9/13/2018  3:03 AM   Dressing Status Clean, dry, & intact 9/13/2018  3:03 AM   Dressing Type Disk with Chlorhexadine gluconate (CHG) 9/13/2018  3:03 AM   Action Taken Open ports on tubing capped 9/13/2018  3:03 AM   Positive Blood Return (Medial Site) Yes 9/13/2018  3:03 AM            PEG/Gastrostomy Tube 09/10/18 (Active)   Site Assessment Clean, dry, & intact 9/13/2018  3:03 AM   Dressing Status Clean, dry, & intact 9/13/2018  3:03 AM   G Port Status Infusing 9/13/2018  3:03 AM   Action Taken Retaped 9/12/2018  4:20 PM   Tube Feeding/Formula Options Twocal HN 9/13/2018  3:03 AM   Tube Feeding/Verify Rate (mL/hr) 30 9/13/2018  3:03 AM                   Readmission Risk Assessment Tool Score Low Risk            3       Total Score        3 Has Seen PCP in Last 6 Months (Yes=3, No=0)        Criteria that do not apply:    . Living with Significant Other. Assisted Living. LTAC. SNF. or   Rehab    Patient Length of Stay (>5 days = 3)    IP Visits Last 12 Months (1-3=4, 4=9, >4=11)    Pt.  Coverage (Medicare=5 , Medicaid, or Self-Pay=4)    Charlson Comorbidity Score (Age + Comorbid Conditions)       Expected Length of Stay 3d 0h   Actual Length of Stay [address] [place], CNA

## 2018-09-14 LAB
ANION GAP SERPL CALC-SCNC: 10 MMOL/L (ref 5–15)
BASOPHILS # BLD: 0 K/UL (ref 0–0.1)
BASOPHILS NFR BLD: 0 % (ref 0–1)
BUN SERPL-MCNC: 22 MG/DL (ref 6–20)
BUN/CREAT SERPL: 11 (ref 12–20)
CALCIUM SERPL-MCNC: 7.7 MG/DL (ref 8.5–10.1)
CHLORIDE SERPL-SCNC: 96 MMOL/L (ref 97–108)
CO2 SERPL-SCNC: 27 MMOL/L (ref 21–32)
CREAT SERPL-MCNC: 2.03 MG/DL (ref 0.7–1.3)
DIFFERENTIAL METHOD BLD: ABNORMAL
EOSINOPHIL # BLD: 0 K/UL (ref 0–0.4)
EOSINOPHIL NFR BLD: 1 % (ref 0–7)
ERYTHROCYTE [DISTWIDTH] IN BLOOD BY AUTOMATED COUNT: 12.6 % (ref 11.5–14.5)
GLUCOSE SERPL-MCNC: 89 MG/DL (ref 65–100)
HCT VFR BLD AUTO: 30 % (ref 36.6–50.3)
HGB BLD-MCNC: 10.5 G/DL (ref 12.1–17)
IMM GRANULOCYTES # BLD: 0 K/UL (ref 0–0.04)
IMM GRANULOCYTES NFR BLD AUTO: 0 % (ref 0–0.5)
LYMPHOCYTES # BLD: 0.5 K/UL (ref 0.8–3.5)
LYMPHOCYTES NFR BLD: 23 % (ref 12–49)
MAGNESIUM SERPL-MCNC: 1.7 MG/DL (ref 1.6–2.4)
MCH RBC QN AUTO: 28.8 PG (ref 26–34)
MCHC RBC AUTO-ENTMCNC: 35 G/DL (ref 30–36.5)
MCV RBC AUTO: 82.4 FL (ref 80–99)
MONOCYTES # BLD: 0.5 K/UL (ref 0–1)
MONOCYTES NFR BLD: 22 % (ref 5–13)
NEUTS SEG # BLD: 1.3 K/UL (ref 1.8–8)
NEUTS SEG NFR BLD: 54 % (ref 32–75)
NRBC # BLD: 0 K/UL (ref 0–0.01)
NRBC BLD-RTO: 0 PER 100 WBC
PLATELET # BLD AUTO: 131 K/UL (ref 150–400)
PMV BLD AUTO: 9.5 FL (ref 8.9–12.9)
POTASSIUM SERPL-SCNC: 3.3 MMOL/L (ref 3.5–5.1)
RBC # BLD AUTO: 3.64 M/UL (ref 4.1–5.7)
RBC MORPH BLD: ABNORMAL
SODIUM SERPL-SCNC: 133 MMOL/L (ref 136–145)
WBC # BLD AUTO: 2.3 K/UL (ref 4.1–11.1)

## 2018-09-14 PROCEDURE — 74011250637 HC RX REV CODE- 250/637: Performed by: INTERNAL MEDICINE

## 2018-09-14 PROCEDURE — 74011000250 HC RX REV CODE- 250: Performed by: INTERNAL MEDICINE

## 2018-09-14 PROCEDURE — 74011250636 HC RX REV CODE- 250/636: Performed by: INTERNAL MEDICINE

## 2018-09-14 PROCEDURE — C9113 INJ PANTOPRAZOLE SODIUM, VIA: HCPCS | Performed by: INTERNAL MEDICINE

## 2018-09-14 PROCEDURE — 36415 COLL VENOUS BLD VENIPUNCTURE: CPT | Performed by: INTERNAL MEDICINE

## 2018-09-14 PROCEDURE — 83735 ASSAY OF MAGNESIUM: CPT | Performed by: INTERNAL MEDICINE

## 2018-09-14 PROCEDURE — 65270000015 HC RM PRIVATE ONCOLOGY

## 2018-09-14 PROCEDURE — 80048 BASIC METABOLIC PNL TOTAL CA: CPT | Performed by: INTERNAL MEDICINE

## 2018-09-14 PROCEDURE — 85025 COMPLETE CBC W/AUTO DIFF WBC: CPT | Performed by: INTERNAL MEDICINE

## 2018-09-14 RX ORDER — MAGNESIUM SULFATE HEPTAHYDRATE 40 MG/ML
2 INJECTION, SOLUTION INTRAVENOUS ONCE
Status: COMPLETED | OUTPATIENT
Start: 2018-09-14 | End: 2018-09-16

## 2018-09-14 RX ORDER — POTASSIUM CHLORIDE 7.45 MG/ML
10 INJECTION INTRAVENOUS
Status: COMPLETED | OUTPATIENT
Start: 2018-09-14 | End: 2018-09-16

## 2018-09-14 RX ADMIN — GABAPENTIN 300 MG: 250 SUSPENSION ORAL at 18:34

## 2018-09-14 RX ADMIN — MORPHINE SULFATE 3 MG: 10 INJECTION INTRAVENOUS at 18:32

## 2018-09-14 RX ADMIN — LIDOCAINE HYDROCHLORIDE 15 ML: 20 SOLUTION ORAL; TOPICAL at 14:46

## 2018-09-14 RX ADMIN — MORPHINE SULFATE 3 MG: 10 INJECTION INTRAVENOUS at 02:39

## 2018-09-14 RX ADMIN — GABAPENTIN 300 MG: 250 SUSPENSION ORAL at 23:31

## 2018-09-14 RX ADMIN — OXYCODONE HYDROCHLORIDE 10 MG: 5 TABLET ORAL at 10:14

## 2018-09-14 RX ADMIN — LIDOCAINE HYDROCHLORIDE 5 ML: 20 SOLUTION ORAL; TOPICAL at 14:46

## 2018-09-14 RX ADMIN — POTASSIUM CHLORIDE 10 MEQ: 10 INJECTION, SOLUTION INTRAVENOUS at 12:00

## 2018-09-14 RX ADMIN — NYSTATIN 500000 UNITS: 100000 SUSPENSION ORAL at 22:20

## 2018-09-14 RX ADMIN — POTASSIUM CHLORIDE 10 MEQ: 10 INJECTION, SOLUTION INTRAVENOUS at 11:12

## 2018-09-14 RX ADMIN — NYSTATIN 500000 UNITS: 100000 SUSPENSION ORAL at 08:29

## 2018-09-14 RX ADMIN — ONDANSETRON 8 MG: 2 INJECTION INTRAMUSCULAR; INTRAVENOUS at 12:34

## 2018-09-14 RX ADMIN — POTASSIUM CHLORIDE 10 MEQ: 10 INJECTION, SOLUTION INTRAVENOUS at 12:32

## 2018-09-14 RX ADMIN — GABAPENTIN 300 MG: 250 SUSPENSION ORAL at 08:33

## 2018-09-14 RX ADMIN — NYSTATIN 500000 UNITS: 100000 SUSPENSION ORAL at 18:31

## 2018-09-14 RX ADMIN — MORPHINE SULFATE 3 MG: 10 INJECTION INTRAVENOUS at 14:46

## 2018-09-14 RX ADMIN — NYSTATIN 500000 UNITS: 100000 SUSPENSION ORAL at 14:46

## 2018-09-14 RX ADMIN — MORPHINE SULFATE 3 MG: 10 INJECTION INTRAVENOUS at 07:35

## 2018-09-14 RX ADMIN — LIDOCAINE HYDROCHLORIDE 15 ML: 20 SOLUTION ORAL; TOPICAL at 08:33

## 2018-09-14 RX ADMIN — GABAPENTIN 300 MG: 250 SUSPENSION ORAL at 00:43

## 2018-09-14 RX ADMIN — PANTOPRAZOLE SODIUM 40 MG: 40 INJECTION, POWDER, FOR SOLUTION INTRAVENOUS at 18:31

## 2018-09-14 RX ADMIN — LIDOCAINE HYDROCHLORIDE 15 ML: 20 SOLUTION ORAL; TOPICAL at 18:35

## 2018-09-14 RX ADMIN — SODIUM CHLORIDE 125 ML/HR: 900 INJECTION, SOLUTION INTRAVENOUS at 18:14

## 2018-09-14 RX ADMIN — POLYETHYLENE GLYCOL 3350 17 G: 17 POWDER, FOR SOLUTION ORAL at 08:37

## 2018-09-14 RX ADMIN — POTASSIUM CHLORIDE 10 MEQ: 10 INJECTION, SOLUTION INTRAVENOUS at 08:32

## 2018-09-14 RX ADMIN — MAGNESIUM SULFATE HEPTAHYDRATE 2 G: 40 INJECTION, SOLUTION INTRAVENOUS at 10:11

## 2018-09-14 RX ADMIN — MORPHINE SULFATE 3 MG: 10 INJECTION INTRAVENOUS at 23:31

## 2018-09-14 RX ADMIN — LIDOCAINE HYDROCHLORIDE 5 ML: 20 SOLUTION ORAL; TOPICAL at 22:25

## 2018-09-14 RX ADMIN — PROCHLORPERAZINE EDISYLATE 10 MG: 5 INJECTION INTRAMUSCULAR; INTRAVENOUS at 18:31

## 2018-09-14 NOTE — PROGRESS NOTES
CM called [address] [phone] - to check on bed availability. Patient has been accepted for transfer, but hospital is on critical diversion. Transfer Center to call nursing staff when bed becomes available.     Caroline Barrera, RN, BSN, [facility]   - Medical Oncology  [phone]

## 2018-09-14 NOTE — PROGRESS NOTES
Hematology Oncology Progress Note       Follow up for: head and neck cancer (left tonsil)    Chart notes reviewed since last visit. Case discussed with following: nursing staff. Dr. Jeremy Eid indicated [facility] is still on diversion yesterday - patient is on list for transfer when bed available. Patient complains of the following: severe soreness of mouth persists with worsening blood tinged secretions - now streaking entirety of the suction catheter and its tubing. 2 bouts of emesis earlier - got scared and asked for TF to be turned off. Additional concerns noted by the staff:     Patient Vitals for the past 24 hrs:   BP Temp Pulse Resp SpO2   09/14/18 0732 154/88 99.1 °F (37.3 °C) 76 16 98 %   09/13/18 2327 150/87 98.1 °F (36.7 °C) 81 16 100 %   09/13/18 1951 145/86 98.5 °F (36.9 °C) 78 16 100 %   09/13/18 1448 141/82 98.5 °F (36.9 °C) 87 16 100 %   09/13/18 1144 146/86 98 °F (36.7 °C) 91 16 98 %   09/13/18 1013 149/77 98.4 °F (36.9 °C) 80 20 100 %       Review of Systems:  Constitutional  fatigue weight loss. mild distress secondary to pain with swallowing. Allergic/Immunologic No recent allergic reactions   Eyes No significant visual difficulties. No diplopia. ENMT sore throat   Endocrine No hot flashes or night sweats. No cold intolerance, polyuria, or polydipsia   Hematologic/Lymphatic No easy bruising or bleeding. The patient denies any tender or palpable lymph nodes   Breasts No abnormal masses of breast, nipple discharge or pain. Respiratory No dyspnea on exertion, orthopnea, chest pain, cough or hemoptysis. Cardiovascular No anginal chest pain, irregular heart beat, tachycardia, palpitations or orthopnea. Gastrointestinal  nausea,  dysphagia   Genitourinary (M) No hematuria, dysuria, increased frequency, urgency, hesitancy or incontinence. Musculoskeletal No joint pain, swelling or redness. No decreased range of motion.    Integumentary No chronic rashes, inflammation, ulcerations, pruritus, petechiae, purpura, ecchymoses, or skin changes. Neurologic No headache, blurred vision, and no areas of focal weakness or numbness. Psychiatric No insomnia, depression, dayne or mood swings. Physical Examination:  Constitutional Alert, cooperative, oriented. Mood and affect appropriate. Appears close to chronological age. Well nourished. Well developed. Head Normocephalic; no scars   Eyes Conjunctivae and sclerae are clear and without icterus. Pupils are round   ENMT Sinuses are nontender. Posterior mucositis not well visualized due to limited ability to open mouth fully - appears inflammed. Copious mucous    Neck Supple without masses or thyromegaly. No jugular venous distension. Hematologic/Lymphatic No petechiae or purpura. No tender or palpable lymph nodes noted   Respiratory Lungs are clear to auscultation without rhonchi or wheezing. Cardiovascular Regular rate and rhythm of heart    Chest / Line Site Chest is symmetric with no chest wall deformities. Abdomen Non-tender, non-distended, no masses, ascites or hepatosplenomegaly. Good bowel sounds. Musculoskeletal No tenderness or swelling, normal range of motion without obvious weakness. Extremities No visible deformities, no cyanosis, clubbing or edema. Skin No rashes, scars, or lesions suggestive of malignancy. No petechiae, purpura, or ecchymoses. No excoriations. Neurologic No sensory or motor deficits noted grossly. Psychiatric Alert and oriented. Coherent speech. Verbalizes understanding of our discussions today.        Labs:  Recent Results (from the past 24 hour(s))   MAGNESIUM    Collection Time: 09/14/18  2:42 AM   Result Value Ref Range    Magnesium 1.7 1.6 - 2.4 mg/dL   METABOLIC PANEL, BASIC    Collection Time: 09/14/18  2:42 AM   Result Value Ref Range    Sodium 133 (L) 136 - 145 mmol/L    Potassium 3.3 (L) 3.5 - 5.1 mmol/L    Chloride 96 (L) 97 - 108 mmol/L    CO2 27 21 - 32 mmol/L    Anion gap 10 5 - 15 mmol/L    Glucose 89 65 - 100 mg/dL    BUN 22 (H) 6 - 20 MG/DL    Creatinine 2.03 (H) 0.70 - 1.30 MG/DL    BUN/Creatinine ratio 11 (L) 12 - 20      GFR est AA 44 (L) >60 ml/min/1.73m2    GFR est non-AA 36 (L) >60 ml/min/1.73m2    Calcium 7.7 (L) 8.5 - 10.1 MG/DL   CBC WITH AUTOMATED DIFF    Collection Time: 09/14/18  2:42 AM   Result Value Ref Range    WBC 2.3 (L) 4.1 - 11.1 K/uL    RBC 3.64 (L) 4.10 - 5.70 M/uL    HGB 10.5 (L) 12.1 - 17.0 g/dL    HCT 30.0 (L) 36.6 - 50.3 %    MCV 82.4 80.0 - 99.0 FL    MCH 28.8 26.0 - 34.0 PG    MCHC 35.0 30.0 - 36.5 g/dL    RDW 12.6 11.5 - 14.5 %    PLATELET 585 (L) 779 - 400 K/uL    MPV 9.5 8.9 - 12.9 FL    NRBC 0.0 0  WBC    ABSOLUTE NRBC 0.00 0.00 - 0.01 K/uL    NEUTROPHILS 54 32 - 75 %    LYMPHOCYTES 23 12 - 49 %    MONOCYTES 22 (H) 5 - 13 %    EOSINOPHILS 1 0 - 7 %    BASOPHILS 0 0 - 1 %    IMMATURE GRANULOCYTES 0 0.0 - 0.5 %    ABS. NEUTROPHILS 1.3 (L) 1.8 - 8.0 K/UL    ABS. LYMPHOCYTES 0.5 (L) 0.8 - 3.5 K/UL    ABS. MONOCYTES 0.5 0.0 - 1.0 K/UL    ABS. EOSINOPHILS 0.0 0.0 - 0.4 K/UL    ABS. BASOPHILS 0.0 0.0 - 0.1 K/UL    ABS. IMM. GRANS. 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 PEG tube feedings as tolerated. Symptomatic meds - magic mouthwash, viscous lidocaine, morphine as tolerated. More blood tinged secretions (still mostly saliva with blood and does not appear to be hematemesis per se). Have discussed with Rad Onc = will hold XRT today and thus he will have today, tomorrow and Sunday off to try to heal. Will d/c enoxparin which was ordered bid in view of his size and see if this helps as well. Hypokalemia, hypomagnesemia - potassium and magnesium still low today so will supplement again. Acute renal failure - creatinine improving with hydration.  Will continue at 125 ml/hr  since po intake remains minimal.     pain control - doing better with  Morphine, magic mouthwash, viscous lidocaine. Nausea - on zofran    Will d/c enoxaparin for now but when bloody secretions subside, will need to consider resuming. SCDs for DVT prophylaxis for now.

## 2018-09-14 NOTE — PROGRESS NOTES
0730: pt rang nurse bell. This nurse answered the bell, and the patient showed that the suction tubing was full of blood from his mouth. Will notify provider. [id number]: Lisette's office was called. They stated that Dr. Catalina Villalobos is rounding today. Will let her know of the patient's sputum when she arrives.

## 2018-09-14 NOTE — PROGRESS NOTES
Nutrition Assessment:    INTERVENTIONS/RECOMMENDATIONS:   Resume TF if pt is agreeable   · TwoCal @ 20 ml/hr increasing by 10 ml q 8 hrs to goal of 50 ml/hr + 150 ml water flush q 3 hrs  · Provides: 2400 kcal, 100 g pro and 2040 ml free water    ASSESSMENT:   Chart reviewed and pt discussed with RN. Pt was on continuous TF all yesterday, TwoCal @ 30 ml/hr (unbale to reach goal of 50 ml/hr). He had 2 episodes of emesis late last night at which time he asked to discontinue TF. Pt was fearful to initiate continuous TF due to nausea and vomiting, now I am worried that he will not let us restart them. Will give the day off of feeding and visit with him in the morning. No recent bowel movement documented and flowsheet note mentions pt is uncertain of his last one. Could this be contributing to his nausea and vomiting? He has blood tinged secretion    Diet Order: Clear liquids  % Eaten:  No data found. Pertinent Medications: [x]Reviewed: zofran, PPI, KCl  Pertinent Labs: [x]Reviewed: K+ 3.3,   Food Allergies: [x]NKFA  []Other   Last BM:  unsure  Edema:      []RUE   []LUE   []RLE   []LLE      Pressure Ulcer:      [] Stage I   [] Stage II   [] Stage III   [] Stage IV      Anthropometrics: Height:   Weight: 123.7 kg (272 lb 11.3 oz)    IBW (%IBW):   ( ) UBW (%UBW):   (  %)    BMI: Body mass index is 40.27 kg/(m^2). This BMI is indicative of:  []Underweight   []Normal   []Overweight   [] Obesity   [x] Extreme Obesity (BMI>40)  Last Weight Metrics:  Weight Loss Metrics 9/11/2018 8/29/2018 7/19/2018 6/17/2018 5/30/2018 4/24/2018 4/2/2018   Today's Wt 272 lb 11.3 oz 286 lb 8 oz 313 lb 8 oz 343 lb 343 lb 333 lb 335 lb   BMI 40.27 kg/m2 42.31 kg/m2 46.3 kg/m2 50.65 kg/m2 50.65 kg/m2 49.18 kg/m2 49.47 kg/m2       Estimated Nutrition Needs (Based on): 0607 Kcals/day (MSJL: 2180 x 1.2) , 105 g (0.8 g/kg) Protein  Carbohydrate:  At Least 130 g/day  Fluids: 2615 mL/day or per primary team    NUTRITION DIAGNOSES:   Problem:  Less than optimal enteral nutrition      Etiology: related to pt refusing TF     Signs/Symptoms: as evidenced by minimal TF intake since admission    Previous Nutrition Dx:  [] Resolved  [] Unresolved           [x] Progressing    NUTRITION INTERVENTIONS:  Meals/Snacks: General/healthful diet Enteral/Parenteral Nutrition: Initiate enteral nutrition Supplements: Commercial supplement              GOAL:   tolerate TF in 1-3 days    NUTRITION MONITORING AND EVALUATION      Food/Nutrient Intake Outcomes: Total energy intake, Enteral/parenteral nutrition intake  Physical Signs/Symptoms Outcomes: Weight/weight change, Electrolyte and renal profile, GI, Glucose profile    Previous Goal Met:   [] Met              [x] Progressing Towards Goal              [] Not Progressing Towards Goal   Previous Recommendations:   [x] Implemented          [] Not Implemented          [] Not Applicable    LEARNING NEEDS (Diet, Food/Nutrient-Drug Interaction):    [x] None Identified   [] Identified and Education Provided/Documented   [] Identified and Pt declined/was not appropriate     Cultural, Shinto, OR Ethnic Dietary Needs:    [x] None Identified   [] Identified and Addressed     [x] Interdisciplinary Care Plan Reviewed/Documented    [x] Discharge Planning: Bolus 240 ml of TwoCal q 4 hrs until goal volume of 1200 ml per day is reached (5 boluses total).  60 ml water flush before and after bolus  Provides: 2400 kcal, 100 g pro and 1440 ml free water   [] Participated in Interdisciplinary Rounds    NUTRITION RISK:    [x] High              [] Moderate           []  Low  []  Minimal/Uncompromised      Geremias Pandey RDN  Pager [phone]  Weekend Pager [phone]

## 2018-09-14 NOTE — PROGRESS NOTES
Oncology Nursing Communication Tool  6:28 AM  9/14/2018     Bedside and Verbal shift change report given to [address] [facility], RN (incoming nurse) by Kyara March CNA (outgoing nurse) on [address]. Report included the following information SBAR, Kardex and MAR. Shift Summary: Pt rested, pain and nausea were controlled, still no bed for transfer at [address] for physician to address: transfer       Oncology Shift Note   Admission Date 9/10/2018   Admission Diagnosis nausea, vomiting, dehydration  Intractable nausea and vomiting   Code Status Full Code   Consults IP CONSULT TO RADIATION ONCOLOGY      Cardiac Monitoring [] Yes [x] No      Purposeful Hourly Rounding [x] Yes    Jason Score Total Score: 1   Jason score 3 or > [] Bed Alarm [] Avasys [] 1:1 sitter [] Patient refused (Place signed refusal form in chart)      Pain Managed [x] Yes [] No    Key Pain Meds             oxyCODONE-acetaminophen (PERCOCET) 5-325 mg per tablet  (Taking) Take 1 Tab by mouth every four (4) hours as needed for Pain. Max Daily Amount: 6 Tabs. Influenza Vaccine Received Flu Vaccine for Current Season (usually Sept-March): Not Flu Season           Oxygen needs?  [x] Room air Oxygen @  []1L    []2L    []3L   []4L    []5L   []6L     Use home O2? [] Yes [] No  Perform O2 challenge test using  smartphrase (.oxygenchallenge)      Last bowel movement Last Bowel Movement Date:  (patient cant recall)  bowel movement      Urinary Catheter             LDAs             Venous Access Device right chest port 09/10/18 Upper chest (subclavicular area, right (Active)   Central Line Being Utilized Yes 9/14/2018  3:28 AM   Criteria for Appropriate Use Limited/no vessel suitable for conventional peripheral access 9/14/2018  3:28 AM   Site Assessment Clean, dry, & intact 9/14/2018  3:28 AM   Date of Last Dressing Change 09/12/18 9/13/2018  8:34 PM   Dressing Status Clean, dry, & intact 9/14/2018  3:28 AM   Dressing Type Transparent 9/14/2018  3:28 AM   Action Taken Open ports on tubing capped 9/14/2018  3:28 AM   Positive Blood Return (Medial Site) Yes 9/14/2018  3:28 AM            PEG/Gastrostomy Tube 09/10/18 (Active)   Site Assessment Clean, dry, & intact 9/14/2018  3:28 AM   Dressing Status Clean, dry, & intact 9/14/2018  3:28 AM   G Port Status Clamped 9/14/2018  3:28 AM   Action Taken Retaped 9/12/2018  4:20 PM   Tube Feeding/Formula Options Twocal HN 9/13/2018  8:34 PM   Tube Feeding/Verify Rate (mL/hr) 30 9/13/2018  3:03 AM                   Readmission Risk Assessment Tool Score Low Risk            3       Total Score        3 Has Seen PCP in Last 6 Months (Yes=3, No=0)        Criteria that do not apply:    . Living with Significant Other. Assisted Living. LTAC. SNF. or   Rehab    Patient Length of Stay (>5 days = 3)    IP Visits Last 12 Months (1-3=4, 4=9, >4=11)    Pt.  Coverage (Medicare=5 , Medicaid, or Self-Pay=4)    Charlson Comorbidity Score (Age + Comorbid Conditions)       Expected Length of Stay 3d 0h   Actual Length of Stay [address] [facility], [facility]

## 2018-09-14 NOTE — PROGRESS NOTES
Speech path  Pt refused any po when offered. He is suctioning bloody secretions orally. His oropharynx looks very red. Transfer to [facility] at some point when they can accept him.    Benjamin Akhtar, SLP

## 2018-09-14 NOTE — PROGRESS NOTES
Oncology Nursing Communication Tool  7:04 PM  9/14/2018     \"Bedside\" shift change report given to [facility] RN (incoming nurse) by Adelina Livingston (outgoing nurse) on [address]. Report included the following information       Shift Summary: pain management, increasing blood in sputum, lovenox d/c, no radiation today due to bleeding, awaiting a bed at [address] [place] [address], bleeding resolved after no lovenox and no radiation      Issues for physician to address: none at this time       Oncology Shift Note   Admission Date 9/10/2018   Admission Diagnosis nausea, vomiting, dehydration  Intractable nausea and vomiting   Code Status Full Code   Consults IP CONSULT TO RADIATION ONCOLOGY      Cardiac Monitoring [] Yes [] No      Purposeful Hourly Rounding [] Yes    Jason Score Total Score: 1   Jason score 3 or > [] Bed Alarm [] Avasys [] 1:1 sitter [] Patient refused (Place signed refusal form in chart)      Pain Managed [] Yes [] No    Key Pain Meds             oxyCODONE-acetaminophen (PERCOCET) 5-325 mg per tablet  (Taking) Take 1 Tab by mouth every four (4) hours as needed for Pain. Max Daily Amount: 6 Tabs. Influenza Vaccine Received Flu Vaccine for Current Season (usually Sept-March): Not Flu Season           Oxygen needs?  [] Room air Oxygen @  []1L    []2L    []3L   []4L    []5L   []6L     Use home O2? [] Yes [] No  Perform O2 challenge test using  smartphrase (.oxygenchallenge)      Last bowel movement Last Bowel Movement Date:  (patient cant recall)  bowel movement      Urinary Catheter             LDAs             Venous Access Device right chest port 09/10/18 Upper chest (subclavicular area, right (Active)   Central Line Being Utilized Yes 9/14/2018  3:30 PM   Criteria for Appropriate Use Limited/no vessel suitable for conventional peripheral access 9/14/2018  3:30 PM   Site Assessment Clean, dry, & intact 9/14/2018  3:30 PM   Date of Last Dressing Change 09/12/18 9/13/2018  8:34 PM   Dressing Status Clean, dry, & intact 9/14/2018  3:30 PM   Dressing Type Transparent;Disk with Chlorhexadine gluconate (CHG) 9/14/2018  3:30 PM   Action Taken Open ports on tubing capped 9/14/2018  3:28 AM   Positive Blood Return (Medial Site) Yes 9/14/2018  3:30 PM            PEG/Gastrostomy Tube 09/10/18 (Active)   Site Assessment Clean, dry, & intact 9/14/2018  3:30 PM   Dressing Status Clean, dry, & intact 9/14/2018  3:30 PM   G Port Status Clamped 9/14/2018  3:30 PM   Action Taken Retaped 9/12/2018  4:20 PM   Tube Feeding/Formula Options Twocal HN 9/14/2018  8:04 AM   Tube Feeding/Verify Rate (mL/hr) 30 9/14/2018  8:04 AM   Intake (ml) 0 ml 9/14/2018  8:04 AM                   Readmission Risk Assessment Tool Score Low Risk            3       Total Score        3 Has Seen PCP in Last 6 Months (Yes=3, No=0)        Criteria that do not apply:    . Living with Significant Other. Assisted Living. LTAC. SNF. or   Rehab    Patient Length of Stay (>5 days = 3)    IP Visits Last 12 Months (1-3=4, 4=9, >4=11)    Pt.  Coverage (Medicare=5 , Medicaid, or Self-Pay=4)    Charlson Comorbidity Score (Age + Comorbid Conditions)       Expected Length of Stay 2d 7h   Actual Length of Stay [address]

## 2018-09-15 LAB
ANION GAP SERPL CALC-SCNC: 11 MMOL/L (ref 5–15)
BUN SERPL-MCNC: 15 MG/DL (ref 6–20)
BUN/CREAT SERPL: 8 (ref 12–20)
CALCIUM SERPL-MCNC: 7.9 MG/DL (ref 8.5–10.1)
CHLORIDE SERPL-SCNC: 100 MMOL/L (ref 97–108)
CO2 SERPL-SCNC: 26 MMOL/L (ref 21–32)
CREAT SERPL-MCNC: 1.8 MG/DL (ref 0.7–1.3)
GLUCOSE SERPL-MCNC: 81 MG/DL (ref 65–100)
MAGNESIUM SERPL-MCNC: 1.7 MG/DL (ref 1.6–2.4)
POTASSIUM SERPL-SCNC: 3.8 MMOL/L (ref 3.5–5.1)
SODIUM SERPL-SCNC: 137 MMOL/L (ref 136–145)

## 2018-09-15 PROCEDURE — 36415 COLL VENOUS BLD VENIPUNCTURE: CPT | Performed by: INTERNAL MEDICINE

## 2018-09-15 PROCEDURE — 74011000250 HC RX REV CODE- 250: Performed by: INTERNAL MEDICINE

## 2018-09-15 PROCEDURE — 74011250636 HC RX REV CODE- 250/636: Performed by: INTERNAL MEDICINE

## 2018-09-15 PROCEDURE — 80048 BASIC METABOLIC PNL TOTAL CA: CPT | Performed by: INTERNAL MEDICINE

## 2018-09-15 PROCEDURE — 83735 ASSAY OF MAGNESIUM: CPT | Performed by: INTERNAL MEDICINE

## 2018-09-15 PROCEDURE — 74011250637 HC RX REV CODE- 250/637: Performed by: INTERNAL MEDICINE

## 2018-09-15 PROCEDURE — C9113 INJ PANTOPRAZOLE SODIUM, VIA: HCPCS | Performed by: INTERNAL MEDICINE

## 2018-09-15 PROCEDURE — 65270000015 HC RM PRIVATE ONCOLOGY

## 2018-09-15 RX ADMIN — LIDOCAINE HYDROCHLORIDE 15 ML: 20 SOLUTION ORAL; TOPICAL at 20:08

## 2018-09-15 RX ADMIN — GABAPENTIN 300 MG: 250 SUSPENSION ORAL at 09:32

## 2018-09-15 RX ADMIN — NYSTATIN 500000 UNITS: 100000 SUSPENSION ORAL at 09:33

## 2018-09-15 RX ADMIN — MORPHINE SULFATE 3 MG: 10 INJECTION INTRAVENOUS at 14:20

## 2018-09-15 RX ADMIN — LIDOCAINE HYDROCHLORIDE 5 ML: 20 SOLUTION ORAL; TOPICAL at 17:20

## 2018-09-15 RX ADMIN — POLYETHYLENE GLYCOL 3350 17 G: 17 POWDER, FOR SOLUTION ORAL at 09:34

## 2018-09-15 RX ADMIN — NYSTATIN 500000 UNITS: 100000 SUSPENSION ORAL at 17:20

## 2018-09-15 RX ADMIN — GABAPENTIN 300 MG: 250 SUSPENSION ORAL at 17:20

## 2018-09-15 RX ADMIN — LIDOCAINE HYDROCHLORIDE 5 ML: 20 SOLUTION ORAL; TOPICAL at 14:10

## 2018-09-15 RX ADMIN — LIDOCAINE HYDROCHLORIDE 5 ML: 20 SOLUTION ORAL; TOPICAL at 22:34

## 2018-09-15 RX ADMIN — MORPHINE SULFATE 3 MG: 10 INJECTION INTRAVENOUS at 05:53

## 2018-09-15 RX ADMIN — SODIUM CHLORIDE 125 ML/HR: 900 INJECTION, SOLUTION INTRAVENOUS at 03:00

## 2018-09-15 RX ADMIN — MORPHINE SULFATE 3 MG: 10 INJECTION INTRAVENOUS at 20:09

## 2018-09-15 RX ADMIN — NYSTATIN 500000 UNITS: 100000 SUSPENSION ORAL at 14:10

## 2018-09-15 RX ADMIN — OXYCODONE HYDROCHLORIDE 10 MG: 5 TABLET ORAL at 17:44

## 2018-09-15 RX ADMIN — PANTOPRAZOLE SODIUM 40 MG: 40 INJECTION, POWDER, FOR SOLUTION INTRAVENOUS at 17:20

## 2018-09-15 RX ADMIN — NYSTATIN 500000 UNITS: 100000 SUSPENSION ORAL at 22:35

## 2018-09-15 RX ADMIN — LIDOCAINE HYDROCHLORIDE 5 ML: 20 SOLUTION ORAL; TOPICAL at 09:33

## 2018-09-15 NOTE — PROGRESS NOTES
Hematology Oncology Progress Note       Follow up for: head and neck cancer (left tonsil)    Chart notes reviewed since last visit. Case discussed with following: nursing staff. Dr. Opal Adrian indicated [facility] is still on diversion yesterday - patient is on list for transfer when bed available. Patient complains of the following: severe soreness of mouth persists with worsening blood tinged secretions -   On TF      Patient Vitals for the past 24 hrs:   BP Temp Pulse Resp SpO2   09/15/18 0700 124/74 98.3 °F (36.8 °C) 82 18 96 %   09/14/18 2315 (!) 152/94 98.5 °F (36.9 °C) 83 16 99 %   09/14/18 2023 128/63 98.3 °F (36.8 °C) 78 16 96 %   09/14/18 1639 174/48 99.1 °F (37.3 °C) 83 16 99 %       Review of Systems:  Constitutional  fatigue weight loss. mild distress secondary to pain with swallowing. Eyes No significant visual difficulties. No diplopia. ENMT sore throat   Endocrine No hot flashes or night sweats. No cold intolerance, polyuria, or polydipsia   Hematologic/Lymphatic No easy bruising or bleeding. The patient denies any tender or palpable lymph nodes   Breasts No abnormal masses of breast, nipple discharge or pain. Respiratory No dyspnea on exertion, orthopnea, chest pain, cough or hemoptysis. Cardiovascular No anginal chest pain, irregular heart beat, tachycardia, palpitations or orthopnea. Gastrointestinal  nausea,  dysphagia   Genitourinary (M) No hematuria, dysuria, increased frequency, urgency, hesitancy or incontinence. Musculoskeletal No joint pain, swelling or redness. No decreased range of motion. Integumentary No chronic rashes, inflammation, ulcerations, pruritus, petechiae, purpura, ecchymoses, or skin changes. Neurologic No headache, blurred vision, and no areas of focal weakness or numbness. Psychiatric No insomnia, depression, dayne or mood swings. Physical Examination:  Constitutional Alert, cooperative, oriented.  Mood and affect appropriate. Head Normocephalic; no scars   Eyes Conjunctivae and sclerae are clear and without icterus. ENMT Sinuses are nontender. Posterior mucositis not well visualized due to limited ability to open mouth fully - appears inflammed. Neck Supple without masses or thyromegaly. Hematologic/Lymphatic No petechiae or purpura. Respiratory Aerating well, no distress       Chest / Line Site Chest is symmetric with no chest wall deformities. Abdomen Non-tender, non-distended,   PEG unremearkable   Musculoskeletal No tenderness or swelling, normal range of motion without obvious weakness. Extremities No visible deformities, no cyanosis, clubbing or edema. Skin No rashes, scars, or lesions suggestive of malignancy. No petechiae, purpura, or ecchymoses. No excoriations. Neurologic No sensory or motor deficits noted grossly. Psychiatric Alert and oriented. Coherent speech. Verbalizes understanding of our discussions today. Labs:  Recent Results (from the past 24 hour(s))   MAGNESIUM    Collection Time: 09/15/18  5:54 AM   Result Value Ref Range    Magnesium 1.7 1.6 - 2.4 mg/dL   METABOLIC PANEL, BASIC    Collection Time: 09/15/18  5:54 AM   Result Value Ref Range    Sodium 137 136 - 145 mmol/L    Potassium 3.8 3.5 - 5.1 mmol/L    Chloride 100 97 - 108 mmol/L    CO2 26 21 - 32 mmol/L    Anion gap 11 5 - 15 mmol/L    Glucose 81 65 - 100 mg/dL    BUN 15 6 - 20 MG/DL    Creatinine 1.80 (H) 0.70 - 1.30 MG/DL    BUN/Creatinine ratio 8 (L) 12 - 20      GFR est AA 50 (L) >60 ml/min/1.73m2    GFR est non-AA 42 (L) >60 ml/min/1.73m2    Calcium 7.9 (L) 8.5 - 10.1 MG/DL       Assessment and Plan:    left tonsillar carcinoma     S/p chemo /RT    Severe mucositis     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 PEG tube feedings as tolerated. Symptomatic meds - magic mouthwash, viscous lidocaine, morphine as tolerated.   Hypokalemia, hypomagnesemia - potassium and magnesiumAcute renal failure - creatinine improving with hydration. Will continue at 125 ml/hr  since po intake remains minimal.     pain control - doing better with  Morphine, magic mouthwash, viscous lidocaine.      Nausea - on zofran

## 2018-09-15 NOTE — PROGRESS NOTES
Report received from off going nurse, Pt. Sitting in bed, HOB elevated 45 degrees, in stable condition, no complaints of pain or discomfort. NS infusing R port at 125 ml/hr. No feedings infusing at this time. Pt. Stated that the feeding started that when he received the feeding earlier, it made him regurgitate. 3365 Feeding started at 30 ml/hr. No residual at this time. Will continue to monitor . 1800 Feeding has been infusing all day. Pt. Tolerating well.

## 2018-09-16 LAB
ANION GAP SERPL CALC-SCNC: 9 MMOL/L (ref 5–15)
BUN SERPL-MCNC: 11 MG/DL (ref 6–20)
BUN/CREAT SERPL: 7 (ref 12–20)
CALCIUM SERPL-MCNC: 7.6 MG/DL (ref 8.5–10.1)
CHLORIDE SERPL-SCNC: 99 MMOL/L (ref 97–108)
CO2 SERPL-SCNC: 28 MMOL/L (ref 21–32)
CREAT SERPL-MCNC: 1.52 MG/DL (ref 0.7–1.3)
GLUCOSE SERPL-MCNC: 84 MG/DL (ref 65–100)
MAGNESIUM SERPL-MCNC: 1.4 MG/DL (ref 1.6–2.4)
POTASSIUM SERPL-SCNC: 3.4 MMOL/L (ref 3.5–5.1)
SODIUM SERPL-SCNC: 136 MMOL/L (ref 136–145)

## 2018-09-16 PROCEDURE — 65270000015 HC RM PRIVATE ONCOLOGY

## 2018-09-16 PROCEDURE — 74011000250 HC RX REV CODE- 250: Performed by: INTERNAL MEDICINE

## 2018-09-16 PROCEDURE — 74011250637 HC RX REV CODE- 250/637: Performed by: INTERNAL MEDICINE

## 2018-09-16 PROCEDURE — 74011250636 HC RX REV CODE- 250/636: Performed by: INTERNAL MEDICINE

## 2018-09-16 PROCEDURE — C9113 INJ PANTOPRAZOLE SODIUM, VIA: HCPCS | Performed by: INTERNAL MEDICINE

## 2018-09-16 PROCEDURE — 80048 BASIC METABOLIC PNL TOTAL CA: CPT | Performed by: INTERNAL MEDICINE

## 2018-09-16 PROCEDURE — 36415 COLL VENOUS BLD VENIPUNCTURE: CPT | Performed by: INTERNAL MEDICINE

## 2018-09-16 PROCEDURE — 83735 ASSAY OF MAGNESIUM: CPT | Performed by: INTERNAL MEDICINE

## 2018-09-16 RX ORDER — FENTANYL 100 UG/H
1 PATCH TRANSDERMAL
Status: DISCONTINUED | OUTPATIENT
Start: 2018-09-16 | End: 2018-09-25 | Stop reason: HOSPADM

## 2018-09-16 RX ADMIN — ONDANSETRON 8 MG: 2 INJECTION INTRAMUSCULAR; INTRAVENOUS at 09:47

## 2018-09-16 RX ADMIN — MORPHINE SULFATE 3 MG: 10 INJECTION INTRAVENOUS at 08:52

## 2018-09-16 RX ADMIN — SODIUM CHLORIDE 125 ML/HR: 900 INJECTION, SOLUTION INTRAVENOUS at 02:26

## 2018-09-16 RX ADMIN — SODIUM CHLORIDE 125 ML/HR: 900 INJECTION, SOLUTION INTRAVENOUS at 11:04

## 2018-09-16 RX ADMIN — NYSTATIN 500000 UNITS: 100000 SUSPENSION ORAL at 12:34

## 2018-09-16 RX ADMIN — OXYCODONE HYDROCHLORIDE 15 MG: 5 TABLET ORAL at 00:57

## 2018-09-16 RX ADMIN — LIDOCAINE HYDROCHLORIDE 15 ML: 20 SOLUTION ORAL; TOPICAL at 14:28

## 2018-09-16 RX ADMIN — PANTOPRAZOLE SODIUM 40 MG: 40 INJECTION, POWDER, FOR SOLUTION INTRAVENOUS at 17:14

## 2018-09-16 RX ADMIN — GABAPENTIN 300 MG: 250 SUSPENSION ORAL at 17:14

## 2018-09-16 RX ADMIN — LIDOCAINE HYDROCHLORIDE 15 ML: 20 SOLUTION ORAL; TOPICAL at 08:52

## 2018-09-16 RX ADMIN — MORPHINE SULFATE 3 MG: 10 INJECTION INTRAVENOUS at 05:21

## 2018-09-16 RX ADMIN — LIDOCAINE HYDROCHLORIDE 5 ML: 20 SOLUTION ORAL; TOPICAL at 22:57

## 2018-09-16 RX ADMIN — GABAPENTIN 300 MG: 250 SUSPENSION ORAL at 00:58

## 2018-09-16 RX ADMIN — MORPHINE SULFATE 3 MG: 10 INJECTION INTRAVENOUS at 17:15

## 2018-09-16 RX ADMIN — MORPHINE SULFATE 3 MG: 10 INJECTION INTRAVENOUS at 14:34

## 2018-09-16 RX ADMIN — GABAPENTIN 300 MG: 250 SUSPENSION ORAL at 09:02

## 2018-09-16 RX ADMIN — OXYCODONE HYDROCHLORIDE 15 MG: 5 TABLET ORAL at 11:11

## 2018-09-16 RX ADMIN — PROCHLORPERAZINE EDISYLATE 10 MG: 5 INJECTION INTRAMUSCULAR; INTRAVENOUS at 17:14

## 2018-09-16 RX ADMIN — GABAPENTIN 300 MG: 250 SUSPENSION ORAL at 23:06

## 2018-09-16 RX ADMIN — LIDOCAINE HYDROCHLORIDE 5 ML: 20 SOLUTION ORAL; TOPICAL at 12:34

## 2018-09-16 RX ADMIN — LIDOCAINE HYDROCHLORIDE 5 ML: 20 SOLUTION ORAL; TOPICAL at 17:14

## 2018-09-16 RX ADMIN — NYSTATIN 500000 UNITS: 100000 SUSPENSION ORAL at 17:14

## 2018-09-16 RX ADMIN — MORPHINE SULFATE 3 MG: 10 INJECTION INTRAVENOUS at 23:03

## 2018-09-16 RX ADMIN — NYSTATIN 500000 UNITS: 100000 SUSPENSION ORAL at 22:57

## 2018-09-16 NOTE — PROGRESS NOTES
Hematology Oncology Progress Note       Follow up for: head and neck cancer (left tonsil)        Patient complains of the following:     severe soreness of mouth and neck discomfort  On TF      Patient Vitals for the past 24 hrs:   BP Temp Pulse Resp SpO2   09/16/18 0816 164/75 97.7 °F (36.5 °C) 78 18 100 %   09/15/18 2334 162/81 98.3 °F (36.8 °C) 72 18 100 %   09/15/18 1902 118/88 99.4 °F (37.4 °C) 95 18 98 %   09/15/18 1529 146/69 98.7 °F (37.1 °C) 72 18 100 %       Review of Systems:  Constitutional  fatigue         Eyes No significant visual difficulties. No diplopia. ENMT sore throat   Endocrine No hot flashes or night sweats. Hematologic/Lymphatic No easy bruising or bleeding. Breasts No abnormal masses of breast, nipple discharge or pain. Respiratory No dyspnea on exertion, orthopnea, chest pain, cough or hemoptysis. Cardiovascular No anginal chest pain, irregular heart beat, tachycardia, palpitations or orthopnea. Gastrointestinal  nausea,  dysphagia   Genitourinary (M) No hematuria, dysuria, increased frequency, urgency, hesitancy or incontinence. Musculoskeletal No joint pain, swelling or redness. N   Integumentary No chronic rashes, inflammation, ulcerations,    Neurologic No headache, blurred vision, and no areas of focal weakness or numbness. Psychiatric No insomnia, depression, dayne or mood swings. Physical Examination:  Constitutional Alert, cooperative, oriented. Mood and affect appropriate. Head Normocephalic; no scars   Eyes Conjunctivae and sclerae are clear and without icterus. ENMT Mucositis    Neck Supple without masses or thyromegaly. Hematologic/Lymphatic No petechiae or purpura. Respiratory Aerating well, no distress       Chest / Line Site Chest is symmetric with no chest wall deformities. Abdomen Non-tender, non-distended,   PEG unremearkable   Musculoskeletal No tenderness or swelling, normal range of motion without obvious weakness. Extremities No visible deformities, no cyanosis, clubbing or edema. Skin No rashes, scars, . Neurologic No sensory or motor deficits noted grossly. Psychiatric Alert and oriented. Coherent speech. Verbalizes understanding of our discussions today. Labs:  Recent Results (from the past 24 hour(s))   MAGNESIUM    Collection Time: 09/16/18  5:20 AM   Result Value Ref Range    Magnesium 1.4 (L) 1.6 - 2.4 mg/dL   METABOLIC PANEL, BASIC    Collection Time: 09/16/18  5:20 AM   Result Value Ref Range    Sodium 136 136 - 145 mmol/L    Potassium 3.4 (L) 3.5 - 5.1 mmol/L    Chloride 99 97 - 108 mmol/L    CO2 28 21 - 32 mmol/L    Anion gap 9 5 - 15 mmol/L    Glucose 84 65 - 100 mg/dL    BUN 11 6 - 20 MG/DL    Creatinine 1.52 (H) 0.70 - 1.30 MG/DL    BUN/Creatinine ratio 7 (L) 12 - 20      GFR est AA >60 >60 ml/min/1.73m2    GFR est non-AA 51 (L) >60 ml/min/1.73m2    Calcium 7.6 (L) 8.5 - 10.1 MG/DL       Assessment and Plan:    left tonsillar carcinoma     S/p chemo /RT  S/p C2 of cisplatin  Currently on hold  D.w [facility] , at present no beds    Severe mucositis     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 PEG tube feedings as tolerated. Symptomatic meds - magic mouthwash, viscous lidocaine, morphine as tolerated. pain control - doing better with  Morphine, magic mouthwash, viscous lidocaine.      Nausea - on zofran

## 2018-09-16 NOTE — ROUTINE PROCESS
Oncology Nursing Communication Tool  7:52 AM  9/16/2018     Bedside shift change report given to Alexis Anderson RN (incoming nurse) by Suze Cyr RN (outgoing nurse) on [facility]. Report included the following information SBAR, Kardex, Intake/Output, MAR and Recent Results. Shift Summary: pt med for pain as needed overnight. Pt up to BR with standby assist x1. Continues to have mod-large amt of oral secretions/prod cough & sxs self with yankauer. Per pt request tube feeding stopped & peg tube clamped at 2030 & pt did not want restarted until later this am. Pt awaiting transfer to [facility] when bed available. Transfer center did call last night to report no bed available yet. Issues for physician to address: labs. Encourage tube feeds. Oncology Shift Note   Admission Date 9/10/2018   Admission Diagnosis nausea, vomiting, dehydration  Intractable nausea and vomiting   Code Status Full Code   Consults IP CONSULT TO RADIATION ONCOLOGY      Cardiac Monitoring [] Yes [x] No      Purposeful Hourly Rounding [x] Yes    Jason Score Total Score: 3   Jason score 3 or > [] Bed Alarm [] Avasys [] 1:1 sitter [] Patient refused (Place signed refusal form in chart)      Pain Managed [x] Yes [] No    Key Pain Meds             oxyCODONE-acetaminophen (PERCOCET) 5-325 mg per tablet  (Taking) Take 1 Tab by mouth every four (4) hours as needed for Pain. Max Daily Amount: 6 Tabs. Influenza Vaccine Received Flu Vaccine for Current Season (usually Sept-March): Not Flu Season           Oxygen needs?  [x] Room air Oxygen @  []1L    []2L    []3L   []4L    []5L   []6L     Use home O2? [] Yes [x] No  Perform O2 challenge test using  smartphrase (.oxygenchallenge)      Last bowel movement Last Bowel Movement Date: 09/14/18  bowel movement      Urinary Catheter             LDAs             Venous Access Device right chest port 09/10/18 Upper chest (subclavicular area, right (Active)   [facility] Being Utilized Yes 9/16/2018  2:27 AM   Criteria for Appropriate Use Irritant/vesicant medication 9/16/2018  2:27 AM   Site Assessment Clean, dry, & intact 9/16/2018  2:27 AM   Date of Last Dressing Change 09/12/18 9/16/2018  2:27 AM   Dressing Status Clean, dry, & intact 9/16/2018  2:27 AM   Dressing Type Disk with Chlorhexadine gluconate (CHG); Transparent 9/16/2018  2:27 AM   Action Taken Blood drawn 9/16/2018  2:27 AM   Positive Blood Return (Medial Site) Yes 9/16/2018  2:27 AM   Action Taken (Medial Site) Flushed 9/16/2018  2:27 AM            PEG/Gastrostomy Tube 09/10/18 (Active)   Site Assessment Clean, dry, & intact 9/16/2018  2:27 AM   Dressing Status Clean, dry, & intact 9/16/2018  2:27 AM   G Port Status Clamped 9/16/2018  2:27 AM   Action Taken Other (comment) 9/15/2018  8:16 PM   Tube Feeding/Formula Options Twocal HN 9/14/2018  8:04 AM   Tube Feeding/Verify Rate (mL/hr) 30 9/14/2018  8:04 AM   Intake (ml) 0 ml 9/14/2018  8:04 AM                   Readmission Risk Assessment Tool Score Low Risk            6       Total Score        3 Has Seen PCP in Last 6 Months (Yes=3, No=0)    3 Patient Length of Stay (>5 days = 3)        Criteria that do not apply:    . Living with Significant Other. Assisted Living. LTAC. SNF. or   Rehab    IP Visits Last 12 Months (1-3=4, 4=9, >4=11)    Pt.  Coverage (Medicare=5 , Medicaid, or Self-Pay=4)    Charlson Comorbidity Score (Age + Comorbid Conditions)       Expected Length of Stay 2d 7h   Actual Length of Stay [address], RN

## 2018-09-16 NOTE — PROGRESS NOTES
Oncology Nursing Communication Tool  7:22 PM  9/16/2018     Bedside shift change report given to Sara Sifuentes RN (incoming nurse) by Kirti Weiner (outgoing nurse) on [facility]. Report included the following information SBAR, Kardex, MAR and Recent Results. Shift Summary: Pain controlled with medication throughout shift; tube feed refused throughout the day but encouraged patient to try during evening shift      Issues for physician to address: Oncology Shift Note   Admission Date 9/10/2018   Admission Diagnosis nausea, vomiting, dehydration  Intractable nausea and vomiting   Code Status Full Code   Consults IP CONSULT TO RADIATION ONCOLOGY      Cardiac Monitoring [] Yes [] No      Purposeful Hourly Rounding [] Yes    Jason Score Total Score: 3   Jason score 3 or > [] Bed Alarm [] Avasys [] 1:1 sitter [] Patient refused (Place signed refusal form in chart)      Pain Managed [] Yes [] No    Key Pain Meds             oxyCODONE-acetaminophen (PERCOCET) 5-325 mg per tablet  (Taking) Take 1 Tab by mouth every four (4) hours as needed for Pain. Max Daily Amount: 6 Tabs. Influenza Vaccine Received Flu Vaccine for Current Season (usually Sept-March): Not Flu Season           Oxygen needs?  [] Room air Oxygen @  []1L    []2L    []3L   []4L    []5L   []6L     Use home O2? [] Yes [] No  Perform O2 challenge test using  smartphrase (.oxygenchallenge)      Last bowel movement Last Bowel Movement Date: 09/14/18  bowel movement      Urinary Catheter             LDAs             Venous Access Device right chest port 09/10/18 Upper chest (subclavicular area, right (Active)   Central Line Being Utilized Yes 9/16/2018  4:39 PM   Criteria for Appropriate Use Irritant/vesicant medication 9/16/2018  4:39 PM   Site Assessment Clean, dry, & intact 9/16/2018  4:39 PM   Date of Last Dressing Change 09/12/18 9/16/2018  4:39 PM   Dressing Status Clean, dry, & intact 9/16/2018  4:39 PM   Dressing Type Disk with Chlorhexadine gluconate (CHG); Transparent 9/16/2018  4:39 PM   Action Taken Blood drawn 9/16/2018  2:27 AM   Positive Blood Return (Medial Site) Yes 9/16/2018  4:39 PM   Action Taken (Medial Site) Flushed 9/16/2018  2:27 AM            PEG/Gastrostomy Tube 09/10/18 (Active)   Site Assessment Clean, dry, & intact 9/16/2018  4:39 PM   Dressing Status Clean, dry, & intact 9/16/2018  4:39 PM   G Port Status Clamped 9/16/2018  4:39 PM   Action Taken Other (comment) 9/15/2018  8:16 PM   Tube Feeding/Formula Options Twocal HN 9/14/2018  8:04 AM   Tube Feeding/Verify Rate (mL/hr) 30 9/14/2018  8:04 AM   Intake (ml) 0 ml 9/14/2018  8:04 AM                   Readmission Risk Assessment Tool Score Low Risk            6       Total Score        3 Has Seen PCP in Last 6 Months (Yes=3, No=0)    3 Patient Length of Stay (>5 days = 3)        Criteria that do not apply:    . Living with Significant Other. Assisted Living. LTAC. SNF. or   Rehab    IP Visits Last 12 Months (1-3=4, 4=9, >4=11)    Pt.  Coverage (Medicare=5 , Medicaid, or Self-Pay=4)    Charlson Comorbidity Score (Age + Comorbid Conditions)       Expected Length of Stay 2d 7h   Actual Length of Stay 6          Candance Factor

## 2018-09-16 NOTE — PROGRESS NOTES
Spiritual Care Assessment/Progress Note  [facility]      NAME: Mikey Stapleton      MRN: [id number]  AGE: 43 y.o.  SEX: male  Zoroastrian Affiliation: Church   Language: English     9/16/2018     Total Time (in minutes): 11     Spiritual Assessment begun in [facility] ONCOL[facility]Y through conversation with:         [x]Patient        [] Family    [] Friend(s)        Reason for Consult: Initial/Spiritual assessment, patient floor     Spiritual beliefs: (Please include comment if needed)     [] Identifies with a kelly tradition:         [] Supported by a kelly community:            [] Claims no spiritual orientation:           [] Seeking spiritual identity:                [x] Adheres to an individual form of spirituality:           [] Not able to assess:                           Identified resources for coping:      [x] Prayer                               [] Music                  [] Guided Imagery     [] Family/friends                 [] Pet visits     [] Devotional reading                         [] Unknown     [] Other:                                           Interventions offered during this visit: (See comments for more details)    Patient Interventions: Affirmation of kelly, Iconic (affirming the presence of God/Higher Power), Initial/Spiritual assessment, patient floor, Prayer (assurance of)           Plan of Care:     [] Support spiritual and/or cultural needs    [] Support AMD and/or advance care planning process      [] Support grieving process   [] Coordinate Rites and/or Rituals    [] Coordination with community clergy   [x] No spiritual needs identified at this time   [] Detailed Plan of Care below (See Comments)  [] Make referral to Music Therapy  [] Make referral to Pet Therapy     [] Make referral to Addiction services  [] Make referral to [facility]  [] Make referral to Spiritual Care Partner  [] No future visits requested        [x] Follow up visits as needed Comments:   Initial visit on Oncology unit for spiritual assessment. No visitors present. Patient was speaking on the phone so declined visit at this time. He did ask to be kept in prayer which I assured him I would do so. Advised him of  availability.   Jayna Jorge MPS, [address] [place] [address] Paging Service  [phone])

## 2018-09-17 LAB — MAGNESIUM SERPL-MCNC: 1.2 MG/DL (ref 1.6–2.4)

## 2018-09-17 PROCEDURE — 65270000015 HC RM PRIVATE ONCOLOGY

## 2018-09-17 PROCEDURE — 77336 RADIATION PHYSICS CONSULT: CPT

## 2018-09-17 PROCEDURE — 36415 COLL VENOUS BLD VENIPUNCTURE: CPT | Performed by: INTERNAL MEDICINE

## 2018-09-17 PROCEDURE — 74011250636 HC RX REV CODE- 250/636: Performed by: INTERNAL MEDICINE

## 2018-09-17 PROCEDURE — 74011000250 HC RX REV CODE- 250: Performed by: INTERNAL MEDICINE

## 2018-09-17 PROCEDURE — C9113 INJ PANTOPRAZOLE SODIUM, VIA: HCPCS | Performed by: INTERNAL MEDICINE

## 2018-09-17 PROCEDURE — 83735 ASSAY OF MAGNESIUM: CPT | Performed by: INTERNAL MEDICINE

## 2018-09-17 PROCEDURE — 74011250637 HC RX REV CODE- 250/637: Performed by: INTERNAL MEDICINE

## 2018-09-17 PROCEDURE — 90471 IMMUNIZATION ADMIN: CPT

## 2018-09-17 PROCEDURE — 90686 IIV4 VACC NO PRSV 0.5 ML IM: CPT | Performed by: INTERNAL MEDICINE

## 2018-09-17 PROCEDURE — 97116 GAIT TRAINING THERAPY: CPT

## 2018-09-17 RX ORDER — MAGNESIUM SULFATE HEPTAHYDRATE 40 MG/ML
2 INJECTION, SOLUTION INTRAVENOUS ONCE
Status: COMPLETED | OUTPATIENT
Start: 2018-09-17 | End: 2018-09-17

## 2018-09-17 RX ADMIN — GABAPENTIN 300 MG: 250 SUSPENSION ORAL at 18:00

## 2018-09-17 RX ADMIN — MORPHINE SULFATE 3 MG: 10 INJECTION INTRAVENOUS at 04:03

## 2018-09-17 RX ADMIN — LIDOCAINE HYDROCHLORIDE 15 ML: 20 SOLUTION ORAL; TOPICAL at 04:02

## 2018-09-17 RX ADMIN — ONDANSETRON 8 MG: 2 INJECTION INTRAMUSCULAR; INTRAVENOUS at 08:40

## 2018-09-17 RX ADMIN — MORPHINE SULFATE 3 MG: 10 INJECTION INTRAVENOUS at 08:40

## 2018-09-17 RX ADMIN — MORPHINE SULFATE 3 MG: 10 INJECTION INTRAVENOUS at 16:02

## 2018-09-17 RX ADMIN — LIDOCAINE HYDROCHLORIDE 5 ML: 20 SOLUTION ORAL; TOPICAL at 18:00

## 2018-09-17 RX ADMIN — NYSTATIN 500000 UNITS: 100000 SUSPENSION ORAL at 13:14

## 2018-09-17 RX ADMIN — GABAPENTIN 300 MG: 250 SUSPENSION ORAL at 23:02

## 2018-09-17 RX ADMIN — NYSTATIN 500000 UNITS: 100000 SUSPENSION ORAL at 18:00

## 2018-09-17 RX ADMIN — LIDOCAINE HYDROCHLORIDE 5 ML: 20 SOLUTION ORAL; TOPICAL at 13:14

## 2018-09-17 RX ADMIN — LIDOCAINE HYDROCHLORIDE 5 ML: 20 SOLUTION ORAL; TOPICAL at 20:33

## 2018-09-17 RX ADMIN — SODIUM CHLORIDE 125 ML/HR: 900 INJECTION, SOLUTION INTRAVENOUS at 02:32

## 2018-09-17 RX ADMIN — PROCHLORPERAZINE EDISYLATE 10 MG: 5 INJECTION INTRAMUSCULAR; INTRAVENOUS at 00:24

## 2018-09-17 RX ADMIN — OXYCODONE HYDROCHLORIDE 15 MG: 5 TABLET ORAL at 18:07

## 2018-09-17 RX ADMIN — LIDOCAINE HYDROCHLORIDE 5 ML: 20 SOLUTION ORAL; TOPICAL at 08:39

## 2018-09-17 RX ADMIN — OXYCODONE HYDROCHLORIDE 15 MG: 5 TABLET ORAL at 23:03

## 2018-09-17 RX ADMIN — NYSTATIN 500000 UNITS: 100000 SUSPENSION ORAL at 08:39

## 2018-09-17 RX ADMIN — INFLUENZA VIRUS VACCINE 0.5 ML: 15; 15; 15; 15 SUSPENSION INTRAMUSCULAR at 13:20

## 2018-09-17 RX ADMIN — OXYCODONE HYDROCHLORIDE 15 MG: 5 TABLET ORAL at 10:09

## 2018-09-17 RX ADMIN — PANTOPRAZOLE SODIUM 40 MG: 40 INJECTION, POWDER, FOR SOLUTION INTRAVENOUS at 18:00

## 2018-09-17 RX ADMIN — MORPHINE SULFATE 3 MG: 10 INJECTION INTRAVENOUS at 13:17

## 2018-09-17 RX ADMIN — GABAPENTIN 300 MG: 250 SUSPENSION ORAL at 08:39

## 2018-09-17 RX ADMIN — LIDOCAINE HYDROCHLORIDE 15 ML: 20 SOLUTION ORAL; TOPICAL at 20:34

## 2018-09-17 RX ADMIN — MORPHINE SULFATE 3 MG: 10 INJECTION INTRAVENOUS at 20:34

## 2018-09-17 RX ADMIN — MAGNESIUM SULFATE HEPTAHYDRATE 2 G: 40 INJECTION, SOLUTION INTRAVENOUS at 09:54

## 2018-09-17 RX ADMIN — NYSTATIN 500000 UNITS: 100000 SUSPENSION ORAL at 20:33

## 2018-09-17 RX ADMIN — SODIUM CHLORIDE 125 ML/HR: 900 INJECTION, SOLUTION INTRAVENOUS at 20:34

## 2018-09-17 RX ADMIN — LIDOCAINE HYDROCHLORIDE 15 ML: 20 SOLUTION ORAL; TOPICAL at 16:01

## 2018-09-17 RX ADMIN — OXYCODONE HYDROCHLORIDE 15 MG: 5 TABLET ORAL at 00:29
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
## 2018-09-17 NOTE — PROGRESS NOTES
[address] reports to CM that patient has a bed available for transfer.  has requested [facility] stretcher transport for EMTALA transfer. Nursing working on completing EMTALA paperwork. Accepting MD is Dr. Moyer Horse - patient going to [facility] Room 67-B. PCS placed on chart. Nursing to call report to [phone]. Care Management Interventions  PCP Verified by CM: Yes  Palliative Care Criteria Met (RRAT>21 & CHF Dx)?: No  Mode of Transport at Discharge: BLS ([facility] EMTALA transfer to [facility])  Transition of Care Consult (CM Consult):  Other (Transfer to [facility])  MyChart Signup: No  Discharge Durable Medical Equipment: No  Physical Therapy Consult: No  Occupational Therapy Consult: No  Speech Therapy Consult: No  Confirm Follow Up Transport: Family  Plan discussed with Pt/Family/Caregiver: Yes  Freedom of Choice Offered: Yes  Discharge Location  Discharge Placement: Transferred to higher level of care ([address] [facility] [address] transfer)    Daron Kapadia, RN, BSN, [address] [place] [address] Oncology  [phone]

## 2018-09-17 NOTE — PROGRESS NOTES
Spoke to patient and family member. He continues with painful swallow and took one sips while I was in room, resulting in painful grimace and coughing and suction needed. Encouraged him to continue swallowing as able even if just a few sips a day. Will need swallow follow up once XRT is completed. Will need SLP follow up after transfer at [address] [place] [address].          Kelly King M.S., [address] [facility]  Speech-Language Pathologist

## 2018-09-17 NOTE — PROGRESS NOTES
Oncology Interdisciplinary rounds were held today to discuss patient plan of care and outcomes. The following members were present: Nursing, Case Management, Pharmacy and Dietary.     Actual Length of Stay: 7    DRG GLOS: 2.3    Expected Length of Stay: 2d 7h                       Plan               Mobility         Discharge Plan   Radiation Oncology tomorrow 9/18  Continue tube feeds Up with assistance EMTALA to [facility] when bed available

## 2018-09-17 NOTE — ROUTINE PROCESS
Oncology Nursing Communication Tool  7:00 AM  9/17/2018     Bedside shift change report given to Jono Li RN (incoming nurse) by Juan Oleary RN (outgoing nurse) on [facility]. Report included the following information SBAR, Kardex, MAR and Recent Results. Shift Summary: pt med for pain & nausea as needed overnight. Pt has tolerated tube feeding overnight. Restarted at 2025. He continues to orally sx himself with pink tinged/tan secretions. Pt still waiting for transfer to [facility] when bed available. PAC needle due for change if not discharged/transferred today. Issues for physician to address: none         Oncology Shift Note   Admission Date 9/10/2018   Admission Diagnosis nausea, vomiting, dehydration  Intractable nausea and vomiting   Code Status Full Code   Consults IP CONSULT TO RADIATION ONCOLOGY      Cardiac Monitoring [] Yes [x] No      Purposeful Hourly Rounding [x] Yes    Jason Score Total Score: 3   Jason score 3 or > [] Bed Alarm [] Avasys [] 1:1 sitter [] Patient refused (Place signed refusal form in chart)      Pain Managed [x] Yes [] No    Key Pain Meds             oxyCODONE-acetaminophen (PERCOCET) 5-325 mg per tablet  (Taking) Take 1 Tab by mouth every four (4) hours as needed for Pain. Max Daily Amount: 6 Tabs. Influenza Vaccine Received Flu Vaccine for Current Season (usually Sept-March): Not Flu Season           Oxygen needs?  [x] Room air Oxygen @  []1L    []2L    []3L   []4L    []5L   []6L     Use home O2? [] Yes [x] No  Perform O2 challenge test using  smartphrase (.oxygenchallenge)      Last bowel movement Last Bowel Movement Date: 09/14/18  bowel movement      Urinary Catheter             LDAs             Venous Access Device right chest port 09/10/18 Upper chest (subclavicular area, right (Active)   Central Line Being Utilized Yes 9/17/2018 12:29 AM   Criteria for Appropriate Use Irritant/vesicant medication 9/17/2018 12:29 AM   Site Assessment Clean, dry, & intact 9/17/2018 12:29 AM   Date of Last Dressing Change 09/12/18 9/17/2018 12:29 AM   Dressing Status Clean, dry, & intact 9/17/2018 12:29 AM   Dressing Type Disk with Chlorhexadine gluconate (CHG); Transparent 9/17/2018 12:29 AM   Action Taken Blood drawn 9/16/2018  2:27 AM   Date Accessed (Medial Site) 09/10/18 9/17/2018 12:29 AM   Positive Blood Return (Medial Site) Yes 9/17/2018 12:29 AM   Action Taken (Medial Site) Flushed; Infusing 9/17/2018 12:29 AM            PEG/Gastrostomy Tube 09/10/18 (Active)   Site Assessment Clean, dry, & intact 9/17/2018 12:29 AM   Dressing Status Clean, dry, & intact 9/17/2018 12:29 AM   G Port Status Infusing 9/17/2018 12:29 AM   Action Taken Feed set changed;Placement verified (comment) 9/16/2018  8:25 PM   Tube Feeding/Formula Options Twocal HN 9/16/2018  8:25 PM   Tube Feeding/Verify Rate (mL/hr) 30 9/17/2018 12:29 AM   Water Flush Volume (mL) 150 mL 9/17/2018 12:29 AM   Intake (ml) 160 ml 9/16/2018 10:57 PM                   Readmission Risk Assessment Tool Score Low Risk            6       Total Score        3 Has Seen PCP in Last 6 Months (Yes=3, No=0)    3 Patient Length of Stay (>5 days = 3)        Criteria that do not apply:    . Living with Significant Other. Assisted Living. LTAC. SNF. or   Rehab    IP Visits Last 12 Months (1-3=4, 4=9, >4=11)    Pt.  Coverage (Medicare=5 , Medicaid, or Self-Pay=4)    Charlson Comorbidity Score (Age + Comorbid Conditions)       Expected Length of Stay 2d 7h   Actual Length of Stay [facility]

## 2018-09-17 NOTE — PROGRESS NOTES
Problem: Mobility Impaired (Adult and Pediatric)  Goal: *Acute Goals and Plan of Care (Insert Text)  Physical Therapy Goals  Initiated 9/10/2018  1. Patient will move from supine to sit and sit to supine  in bed with independence within 7 day(s). Met 9/17/18. 2.  Patient will transfer from bed to chair and chair to bed with independence using the least restrictive device within 7 day(s). Met 9/17/18. 3.  Patient will perform sit to stand with independence within 7 day(s). Met 9/17/18. 4.  Patient will ambulate with independence for 500 feet with the least restrictive device within 7 day(s). Met 9/17/18. 5.  Patient will ascend/descend 3 stairs with 1 handrail(s) with supervision/set-up within 7 day(s). physical Therapy TREATMENT/DISCHARGE  Patient: Keron Segura (43 y.o. male)  Date: 9/17/2018  Diagnosis: nausea, vomiting, dehydration  Intractable nausea and vomiting Intractable nausea and vomiting       Precautions: Fall  Chart, physical therapy assessment, plan of care and goals were reviewed. ASSESSMENT:  Patient was lying in bed when PT arrived. He agreed to therapy and was cleared by nursing. He reports walking down the denton with RN for shower this morning and reported no problems. Currently he demonstrates independent skill with bed mobility, transfers and ambulation for 650 feet using RW. He needed no standing rests and reported only mild fatigue at the end of the gait session. Recommend  PT for home safety and a rolling walker for home use upon discharge. No further skilled need in acute care for PT - encouraged to ambulate with nursing staff 3-4 x day to tolerance. Progression toward goals:  [x]      Improving appropriately and progressing toward goals  []      Improving slowly and progressing toward goals  []      Not making progress toward goals and plan of care will be adjusted     PLAN:  Patient will be discharged from physical therapy at this time.   Rationale for discharge:  [] Goals Achieved  [] Plateau Reached  [] Patient not participating in therapy  [] Other:  Discharge Recommendations:  Home Health PT  Further Equipment Recommendations for Discharge:  rolling walker     SUBJECTIVE:   Patient stated I walked down the denton for a shower this morning with the nurse and walker.     OBJECTIVE DATA SUMMARY:   Critical Behavior:  Neurologic State: Alert  Orientation Level: Oriented X4        Functional Mobility Training:  Bed Mobility:  Rolling: Independent  Supine to Sit: Independent  Sit to Supine: Independent  Scooting: Independent        Transfers:  Sit to Stand: Independent  Stand to Sit: Independent        Bed to Chair: Independent                    Balance:  Sitting: Intact  Standing: Intact  Standing - Static: Good;Occassional  Standing - Dynamic : Good  Ambulation/Gait Training:  Distance (ft): 650 Feet (ft)  Assistive Device: Gait belt;Walker, rolling  Ambulation - Level of Assistance: Modified independent        Gait Abnormalities: Path deviations              Speed/Elaine: Pace decreased (<100 feet/min)           Interventions: Safety awareness training        Functional Measure:  Barthel Index:    Bathin  Bladder: 10  Bowels: 10  Groomin  Dressing: 10  Feeding: 10  Mobility: 15  Stairs: 10  Toilet Use: 10  Transfer (Bed to Chair and Back): 15  Total: 100       Barthel and G-code impairment scale:  Percentage of impairment CH  0% CI  1-19% CJ  20-39% CK  40-59% CL  60-79% CM  80-99% CN  100%   Barthel Score 0-100 100 99-80 79-60 59-40 20-39 1-19   0   Barthel Score 0-20 20 17-19 13-16 9-12 5-8 1-4 0      The Barthel ADL Index: Guidelines  1. The index should be used as a record of what a patient does, not as a record of what a patient could do. 2. The main aim is to establish degree of independence from any help, physical or verbal, however minor and for whatever reason. 3. The need for supervision renders the patient not independent.   4. A patient's performance should be established using the best available evidence. Asking the patient, friends/relatives and nurses are the usual sources, but direct observation and common sense are also important. However direct testing is not needed. 5. Usually the patient's performance over the preceding 24-48 hours is important, but occasionally longer periods will be relevant. 6. Middle categories imply that the patient supplies over 50 per cent of the effort. 7. Use of aids to be independent is allowed. Casey Concepcion., Barthel, D.W. (2028). Functional evaluation: the Barthel Index. [facility] (14)2. KECIA Eaton, Tiffanie Kim., Izzy Maldonado., [place], [address] (1999). Measuring the change indisability after inpatient rehabilitation; comparison of the responsiveness of the Barthel Index and Functional [place]anco Measure. Journal of Neurology, Neurosurgery, and Psychiatry, 66(4), 750-388. Marisa Contreras, N.J.A, GREG Huitron.J.HOA, & Dejah Eden M.A. (2004.) Assessment of post-stroke quality of life in cost-effectiveness studies: The usefulness of the Barthel Index and the EuroQoL-5D. Quality of Life Research, 13, 267-54         G codes: In compliance with CMSs Claims Based Outcome Reporting, the following G-code set was chosen for this patient based on their primary functional limitation being treated: The outcome measure chosen to determine the severity of the functional limitation was the Barthel with a score of 100/100 which was correlated with the impairment scale. ? Mobility - Walking and Moving Around:     - CURRENT STATUS: CH - 0% impaired, limited or restricted    - GOAL STATUS: CH - 0% impaired, limited or restricted    - D/C STATUS:  CH - 0% impaired, limited or restricted     Pain:  Pain Scale 1: Numeric (0 - 10)  Pain Intensity 1: 9  Pain Location 1: Mouth; Throat        Pain Intervention(s) 1: Medication (see MAR)  Activity Tolerance:   Excellent  Please refer to the flowsheet for vital signs taken during this treatment.   After treatment:   [] Patient left in no apparent distress sitting up in chair  [x] Patient left in no apparent distress in bed  [x] Call bell left within reach  [x] Nursing notified  [] Caregiver present  [] Bed alarm activated    COMMUNICATION/COLLABORATION:   The patients plan of care was discussed with: Registered Nurse and     Shannon Lassiter, PT   Time Calculation: 16 mins

## 2018-09-17 NOTE — PROGRESS NOTES
Nutrition Assessment:    INTERVENTIONS/RECOMMENDATIONS:   EN:  RD will continue to monitor TFs and tolerance. TwoCal @ goal 50 ml/hr will provide daily approx. 2400 kcal, 100 g pro and 2040 ml free water. ASSESSMENT:   9/17:  Chart reviewed; pt's tube feedings were resumed - TwoCal @ 30 ml/hr + 150 ml free water. Pt may be transferred to [address] [place] [address] within the next 24 hours. 9/14: Chart reviewed and pt discussed with RN. Pt was on continuous TF all yesterday, TwoCal @ 30 ml/hr (unbale to reach goal of 50 ml/hr). He had 2 episodes of emesis late last night at which time he asked to discontinue TF. Pt was fearful to initiate continuous TF due to nausea and vomiting, now I am worried that he will not let us restart them. Will give the day off of feeding and visit with him in the morning. No recent bowel movement documented and flowsheet note mentions pt is uncertain of his last one. Could this be contributing to his nausea and vomiting? He has blood tinged secretion    Diet Order: Clear liquids  % Eaten:  No data found. Pertinent Medications: [x] Reviewed []Other:  Pertinent Labs: [x]Reviewed  []Other:  Food Allergies: [x]None []Other:     Last BM: 9/16 loose   []Active     [x]Hyperactive  []Hypoactive       [] Absent  BS  Skin:    [x] Intact   [] Incision  [] Breakdown   []Edema   []Other:     Anthropometrics: Height: 5' 9\" (175.3 cm) Weight: 123.7 kg (272 lb 11.3 oz)    IBW (%IBW):   ( ) UBW (%UBW):   (  %)    BMI: Body mass index is 40.27 kg/(m^2).     This BMI is indicative of:  []Underweight   []Normal   []Overweight   [] Obesity   [x] Extreme Obesity (BMI>40)  Last Weight Metrics:  Weight Loss Metrics 9/11/2018 8/29/2018 7/19/2018 6/17/2018 5/30/2018 4/24/2018 4/2/2018   Today's Wt 272 lb 11.3 oz 286 lb 8 oz 313 lb 8 oz 343 lb 343 lb 333 lb 335 lb   BMI 40.27 kg/m2 42.31 kg/m2 46.3 kg/m2 50.65 kg/m2 50.65 kg/m2 49.18 kg/m2 49.47 kg/m2       Estimated Nutrition Needs (Based on): 1471 Kcals/day (MSJL: 2180 x 1.2) , 105 g (0.8 g/kg) Protein  Carbohydrate: At Least 130 g/day  Fluids: 2000 mL/day     NUTRITION DIAGNOSES:   Problem:  Less than optimal enteral nutrition      Etiology: related to pt refusing TF     Signs/Symptoms: as evidenced by minimal TF intake since admission    Previous Nutrition Dx:  [] Resolved  [] Unresolved           [x] Progressing    NUTRITION INTERVENTIONS:  Meals/Snacks: General/healthful diet Enteral/Parenteral Nutrition: Initiate enteral nutrition Supplements: Commercial supplement              GOAL:   tolerate TF in 1-3 days    NUTRITION MONITORING AND EVALUATION   Food/Nutrient Intake Outcomes:  Total energy intake, Enteral/parenteral nutrition intake  Physical Signs/Symptoms Outcomes: Weight/weight change, Electrolyte and renal profile, GI, Glucose profile    Previous Goal Met:   [] Met              [x] Progressing Towards Goal              [] Not Progressing Towards Goal   Previous Recommendations:   [x] Implemented          [] Not Implemented          [] Not Applicable    LEARNING NEEDS (Diet, Food/Nutrient-Drug Interaction):    [x] None Identified   [] Identified and Education Provided/Documented   [] Identified and Pt declined/was not appropriate     Cultural, Sabianism, OR Ethnic Dietary Needs:    [x] None Identified   [] Identified and Addressed     [x] Interdisciplinary Care Plan Reviewed/Documented    [x] Discharge Planning:  Pt with possible transfer to [facility] within the next 24 hours; will need to continue TFs (currently receiving TwoCal at 30 ml/hr with a goal rate of 50 ml/hr) + 150 ml free water q3h   [] Participated in Interdisciplinary Rounds    NUTRITION RISK:    [x] Patient At Nutritional Risk     [] Patient Not At [address] [place] [address]  Pager [phone]  Weekend Pager [phone]

## 2018-09-17 NOTE — PROGRESS NOTES
Oncology Nursing Communication Tool  7:43 PM  9/17/2018     Bedside shift change report given to ROCIO Sosa (incoming nurse) by Melissa Zhang (outgoing nurse) on [address] [facility]. Report included the following information SBAR, Kardex, MAR and Recent Results. Shift Summary:       Issues for physician to address: Oncology Shift Note   Admission Date 9/10/2018   Admission Diagnosis nausea, vomiting, dehydration  Intractable nausea and vomiting   Code Status Full Code   Consults IP CONSULT TO RADIATION ONCOLOGY      Cardiac Monitoring [] Yes [] No      Purposeful Hourly Rounding [] Yes    Jason Score Total Score: 3   Jason score 3 or > [] Bed Alarm [] Avasys [] 1:1 sitter [] Patient refused (Place signed refusal form in chart)      Pain Managed [] Yes [] No    Key Pain Meds             oxyCODONE-acetaminophen (PERCOCET) 5-325 mg per tablet  (Taking) Take 1 Tab by mouth every four (4) hours as needed for Pain. Max Daily Amount: 6 Tabs. Influenza Vaccine Received Flu Vaccine for Current Season (usually Sept-March): No    Patient/Guardian Refused (Notify MD): No      Oxygen needs? [] Room air Oxygen @  []1L    []2L    []3L   []4L    []5L   []6L     Use home O2? [] Yes [] No  Perform O2 challenge test using  smartphrase (.oxygenchallenge)      Last bowel movement Last Bowel Movement Date: 09/16/18  bowel movement      Urinary Catheter             LDAs             Venous Access Device right chest port 09/10/18 Upper chest (subclavicular area, right (Active)   Central Line Being Utilized Yes 9/17/2018  7:08 PM   Criteria for Appropriate Use Irritant/vesicant medication 9/17/2018  4:07 PM   Site Assessment Clean, dry, & intact 9/17/2018  4:07 PM   Date of Last Dressing Change 09/12/18 9/17/2018  4:07 PM   Dressing Status Clean, dry, & intact 9/17/2018  4:07 PM   Dressing Type Disk with Chlorhexadine gluconate (CHG); Transparent 9/17/2018  4:07 PM   Action Taken Blood drawn 9/16/2018 2: 27 AM   Date Accessed (Medial Site) 09/10/18 9/17/2018 11:07 AM   Positive Blood Return (Medial Site) Yes 9/17/2018  4:07 PM   Action Taken (Medial Site) Flushed; Infusing 9/17/2018 12:29 AM            PEG/Gastrostomy Tube 09/10/18 (Active)   Site Assessment Clean, dry, & intact 9/17/2018  7:08 PM   Dressing Status Clean, dry, & intact 9/17/2018  4:07 PM   G Port Status Infusing 9/17/2018 11:07 AM   Action Taken Feed set changed 9/17/2018  6:14 PM   Tube Feeding/Formula Options Twocal HN 9/17/2018  4:07 PM   Tube Feeding/Verify Rate (mL/hr) 40 9/17/2018  6:14 PM   Water Flush Volume (mL) 150 mL 9/17/2018 11:07 AM   Intake (ml) 495 ml 9/17/2018  6:35 AM                   Readmission Risk Assessment Tool Score Low Risk            6       Total Score        3 Has Seen PCP in Last 6 Months (Yes=3, No=0)    3 Patient Length of Stay (>5 days = 3)        Criteria that do not apply:    . Living with Significant Other. Assisted Living. LTAC. SNF. or   Rehab    IP Visits Last 12 Months (1-3=4, 4=9, >4=11)    Pt.  Coverage (Medicare=5 , Medicaid, or Self-Pay=4)    Charlson Comorbidity Score (Age + Comorbid Conditions)       Expected Length of Stay 2d 7h   Actual Length of Stay [place] 64

## 2018-09-17 NOTE — PROGRESS NOTES
Hematology Oncology Progress Note       Follow up for: head and neck cancer (left tonsil)        Patient complains of the following:     severe soreness of mouth and neck discomfort, but better  On TF      Patient Vitals for the past 24 hrs:   BP Temp Pulse Resp SpO2   09/17/18 0724 144/81 98 °F (36.7 °C) 75 18 100 %   09/16/18 2257 148/85 98.7 °F (37.1 °C) 85 18 100 %   09/16/18 2019 (!) 162/91 97.3 °F (36.3 °C) 81 18 100 %   09/16/18 1623 164/79 98.5 °F (36.9 °C) 64 18 99 %       Review of Systems:negative except as above    Gen NAD  OP mucositis  CV reg  Lungs clear   abd benign  Ext no edema      Labs:  Recent Results (from the past 24 hour(s))   MAGNESIUM    Collection Time: 09/17/18  3:59 AM   Result Value Ref Range    Magnesium 1.2 (L) 1.6 - 2.4 mg/dL       Assessment and Plan:    left tonsillar carcinoma     S/p chemo /RT  S/p C2 of cisplatin  Currently on hold  Transfer to [facility] when bed available  Severe mucositis     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 PEG tube feedings as tolerated. Symptomatic meds - magic mouthwash, viscous lidocaine, morphine as tolerated. pain control - doing better with  Morphine, magic mouthwash, viscous lidocaine. Nausea - on zofran    Hypomagnesemia  -replete            Palliative care fellow told me they have a bed. Hopefully will be able to be transferred today.

## 2018-09-17 NOTE — PROGRESS NOTES
CM spoke to [facility] - patient still unable to transfer due to lack of bed availability. Dr. Burt Hernandez to communicate with Palliative Care MD at [facility].     Hero Dhaliwal RN, BSN, ACM   - Medical Oncology  [phone]

## 2018-09-18 LAB
ALBUMIN SERPL-MCNC: 2.5 G/DL (ref 3.5–5)
ALBUMIN/GLOB SERPL: 0.7 {RATIO} (ref 1.1–2.2)
ALP SERPL-CCNC: 44 U/L (ref 45–117)
ALT SERPL-CCNC: 24 U/L (ref 12–78)
ANION GAP SERPL CALC-SCNC: 8 MMOL/L (ref 5–15)
AST SERPL-CCNC: 13 U/L (ref 15–37)
BASOPHILS # BLD: 0 K/UL (ref 0–0.1)
BASOPHILS NFR BLD: 0 % (ref 0–1)
BILIRUB SERPL-MCNC: 0.3 MG/DL (ref 0.2–1)
BLASTS NFR BLD MANUAL: 0 %
BUN SERPL-MCNC: 8 MG/DL (ref 6–20)
BUN/CREAT SERPL: 5 (ref 12–20)
CALCIUM SERPL-MCNC: 7.1 MG/DL (ref 8.5–10.1)
CHLORIDE SERPL-SCNC: 100 MMOL/L (ref 97–108)
CO2 SERPL-SCNC: 29 MMOL/L (ref 21–32)
CREAT SERPL-MCNC: 1.47 MG/DL (ref 0.7–1.3)
DIFFERENTIAL METHOD BLD: ABNORMAL
EOSINOPHIL # BLD: 0 K/UL (ref 0–0.4)
EOSINOPHIL NFR BLD: 0 % (ref 0–7)
ERYTHROCYTE [DISTWIDTH] IN BLOOD BY AUTOMATED COUNT: 12.6 % (ref 11.5–14.5)
GLOBULIN SER CALC-MCNC: 3.7 G/DL (ref 2–4)
GLUCOSE SERPL-MCNC: 87 MG/DL (ref 65–100)
HCT VFR BLD AUTO: 25.1 % (ref 36.6–50.3)
HGB BLD-MCNC: 8.7 G/DL (ref 12.1–17)
IMM GRANULOCYTES # BLD: 0 K/UL
IMM GRANULOCYTES NFR BLD AUTO: 0 %
LYMPHOCYTES # BLD: 0.3 K/UL (ref 0.8–3.5)
LYMPHOCYTES NFR BLD: 12 % (ref 12–49)
MAGNESIUM SERPL-MCNC: 1.5 MG/DL (ref 1.6–2.4)
MCH RBC QN AUTO: 28.7 PG (ref 26–34)
MCHC RBC AUTO-ENTMCNC: 34.7 G/DL (ref 30–36.5)
MCV RBC AUTO: 82.8 FL (ref 80–99)
METAMYELOCYTES NFR BLD MANUAL: 0 %
MONOCYTES # BLD: 0.1 K/UL (ref 0–1)
MONOCYTES NFR BLD: 4 % (ref 5–13)
MYELOCYTES NFR BLD MANUAL: 0 %
NEUTS BAND NFR BLD MANUAL: 0 % (ref 0–6)
NEUTS SEG # BLD: 2.1 K/UL (ref 1.8–8)
NEUTS SEG NFR BLD: 84 % (ref 32–75)
NRBC # BLD: 0 K/UL (ref 0–0.01)
NRBC BLD-RTO: 0 PER 100 WBC
OTHER CELLS NFR BLD MANUAL: 0 %
PLATELET # BLD AUTO: 118 K/UL (ref 150–400)
PMV BLD AUTO: 9 FL (ref 8.9–12.9)
POTASSIUM SERPL-SCNC: 3.1 MMOL/L (ref 3.5–5.1)
PROMYELOCYTES NFR BLD MANUAL: 0 %
PROT SERPL-MCNC: 6.2 G/DL (ref 6.4–8.2)
RBC # BLD AUTO: 3.03 M/UL (ref 4.1–5.7)
RBC MORPH BLD: ABNORMAL
SODIUM SERPL-SCNC: 137 MMOL/L (ref 136–145)
WBC # BLD AUTO: 2.5 K/UL (ref 4.1–11.1)
WBC MORPH BLD: ABNORMAL

## 2018-09-18 PROCEDURE — 85027 COMPLETE CBC AUTOMATED: CPT | Performed by: INTERNAL MEDICINE

## 2018-09-18 PROCEDURE — 77030018798 HC PMP KT ENTRL FED COVD -A

## 2018-09-18 PROCEDURE — 80053 COMPREHEN METABOLIC PANEL: CPT | Performed by: INTERNAL MEDICINE

## 2018-09-18 PROCEDURE — 83735 ASSAY OF MAGNESIUM: CPT | Performed by: INTERNAL MEDICINE

## 2018-09-18 PROCEDURE — 74011250637 HC RX REV CODE- 250/637: Performed by: INTERNAL MEDICINE

## 2018-09-18 PROCEDURE — 74011250636 HC RX REV CODE- 250/636: Performed by: INTERNAL MEDICINE

## 2018-09-18 PROCEDURE — 77030003560 HC NDL HUBR BARD -A

## 2018-09-18 PROCEDURE — 77386 HC IMRT TRMT DLVR COMPL: CPT

## 2018-09-18 PROCEDURE — C9113 INJ PANTOPRAZOLE SODIUM, VIA: HCPCS | Performed by: INTERNAL MEDICINE

## 2018-09-18 PROCEDURE — 65270000015 HC RM PRIVATE ONCOLOGY

## 2018-09-18 PROCEDURE — 36415 COLL VENOUS BLD VENIPUNCTURE: CPT | Performed by: INTERNAL MEDICINE

## 2018-09-18 PROCEDURE — 74011000250 HC RX REV CODE- 250: Performed by: INTERNAL MEDICINE

## 2018-09-18 RX ORDER — MAGNESIUM SULFATE HEPTAHYDRATE 40 MG/ML
4 INJECTION, SOLUTION INTRAVENOUS ONCE
Status: COMPLETED | OUTPATIENT
Start: 2018-09-18 | End: 2018-09-20

## 2018-09-18 RX ORDER — MAGNESIUM SULFATE 4 G/50ML
4 INJECTION INTRAVENOUS ONCE
Status: DISCONTINUED | OUTPATIENT
Start: 2018-09-18 | End: 2018-09-18 | Stop reason: SDUPTHER

## 2018-09-18 RX ORDER — POTASSIUM CHLORIDE 7.45 MG/ML
10 INJECTION INTRAVENOUS
Status: COMPLETED | OUTPATIENT
Start: 2018-09-18 | End: 2018-09-20

## 2018-09-18 RX ADMIN — OXYCODONE HYDROCHLORIDE 15 MG: 5 TABLET ORAL at 14:50

## 2018-09-18 RX ADMIN — MORPHINE SULFATE 3 MG: 10 INJECTION INTRAVENOUS at 02:03

## 2018-09-18 RX ADMIN — NYSTATIN 500000 UNITS: 100000 SUSPENSION ORAL at 13:00

## 2018-09-18 RX ADMIN — LIDOCAINE HYDROCHLORIDE 15 ML: 20 SOLUTION ORAL; TOPICAL at 17:35

## 2018-09-18 RX ADMIN — POTASSIUM CHLORIDE 10 MEQ: 10 INJECTION, SOLUTION INTRAVENOUS at 12:00

## 2018-09-18 RX ADMIN — ONDANSETRON 8 MG: 2 INJECTION INTRAMUSCULAR; INTRAVENOUS at 17:35

## 2018-09-18 RX ADMIN — MORPHINE SULFATE 3 MG: 10 INJECTION INTRAVENOUS at 13:00

## 2018-09-18 RX ADMIN — OXYCODONE HYDROCHLORIDE 15 MG: 5 TABLET ORAL at 08:32

## 2018-09-18 RX ADMIN — GABAPENTIN 300 MG: 250 SUSPENSION ORAL at 22:35

## 2018-09-18 RX ADMIN — POTASSIUM CHLORIDE 10 MEQ: 10 INJECTION, SOLUTION INTRAVENOUS at 14:00

## 2018-09-18 RX ADMIN — LIDOCAINE HYDROCHLORIDE 5 ML: 20 SOLUTION ORAL; TOPICAL at 14:14

## 2018-09-18 RX ADMIN — NYSTATIN 500000 UNITS: 100000 SUSPENSION ORAL at 17:22

## 2018-09-18 RX ADMIN — MORPHINE SULFATE 3 MG: 10 INJECTION INTRAVENOUS at 05:21

## 2018-09-18 RX ADMIN — NYSTATIN 500000 UNITS: 100000 SUSPENSION ORAL at 22:28

## 2018-09-18 RX ADMIN — ONDANSETRON 8 MG: 2 INJECTION INTRAMUSCULAR; INTRAVENOUS at 08:28

## 2018-09-18 RX ADMIN — SODIUM CHLORIDE 125 ML/HR: 900 INJECTION, SOLUTION INTRAVENOUS at 10:26

## 2018-09-18 RX ADMIN — PANTOPRAZOLE SODIUM 40 MG: 40 INJECTION, POWDER, FOR SOLUTION INTRAVENOUS at 17:22

## 2018-09-18 RX ADMIN — NYSTATIN 500000 UNITS: 100000 SUSPENSION ORAL at 08:44

## 2018-09-18 RX ADMIN — LIDOCAINE HYDROCHLORIDE 5 ML: 20 SOLUTION ORAL; TOPICAL at 22:28

## 2018-09-18 RX ADMIN — POTASSIUM CHLORIDE 10 MEQ: 10 INJECTION, SOLUTION INTRAVENOUS at 14:33

## 2018-09-18 RX ADMIN — SODIUM CHLORIDE 125 ML/HR: 900 INJECTION, SOLUTION INTRAVENOUS at 05:22

## 2018-09-18 RX ADMIN — GABAPENTIN 300 MG: 250 SUSPENSION ORAL at 10:48

## 2018-09-18 RX ADMIN — MORPHINE SULFATE 3 MG: 10 INJECTION INTRAVENOUS at 20:57

## 2018-09-18 RX ADMIN — OXYCODONE HYDROCHLORIDE 15 MG: 5 TABLET ORAL at 22:35

## 2018-09-18 RX ADMIN — LIDOCAINE HYDROCHLORIDE 5 ML: 20 SOLUTION ORAL; TOPICAL at 10:53

## 2018-09-18 RX ADMIN — GABAPENTIN 300 MG: 250 SUSPENSION ORAL at 17:35

## 2018-09-18 RX ADMIN — ONDANSETRON 8 MG: 2 INJECTION INTRAMUSCULAR; INTRAVENOUS at 22:31

## 2018-09-18 RX ADMIN — MORPHINE SULFATE 3 MG: 10 INJECTION INTRAVENOUS at 17:35

## 2018-09-18 RX ADMIN — MAGNESIUM SULFATE IN WATER 4 G: 40 INJECTION, SOLUTION INTRAVENOUS at 11:11

## 2018-09-18 RX ADMIN — POTASSIUM CHLORIDE 10 MEQ: 10 INJECTION, SOLUTION INTRAVENOUS at 17:00

## 2018-09-18 NOTE — PROGRESS NOTES
Hematology Oncology Progress Note       Follow up for: head and neck cancer (left tonsil)        Patient complains of the following:     S: Mouth pain still severe but a little improved overall, not taking anything by mouth. Had xrt today. Patient Vitals for the past 24 hrs:   BP Temp Pulse Resp SpO2 Height   09/18/18 0804 140/75 98 °F (36.7 °C) 78 18 98 % -   09/17/18 2252 146/89 99.1 °F (37.3 °C) 73 18 98 % -   09/17/18 1849 138/70 98.1 °F (36.7 °C) 79 18 99 % -   09/17/18 1616 - - - - - 5' 9\" (1.753 m)   09/17/18 1549 (!) 159/96 98.5 °F (36.9 °C) 74 18 99 % -       Review of Systems:negative except as above    Gen NAD  OP mucositis  CV reg  Lungs clear   abd benign  Ext no edema      Labs:  Recent Results (from the past 24 hour(s))   CBC WITH MANUAL DIFF    Collection Time: 09/18/18  5:23 AM   Result Value Ref Range    WBC 2.5 (L) 4.1 - 11.1 K/uL    RBC 3.03 (L) 4.10 - 5.70 M/uL    HGB 8.7 (L) 12.1 - 17.0 g/dL    HCT 25.1 (L) 36.6 - 50.3 %    MCV 82.8 80.0 - 99.0 FL    MCH 28.7 26.0 - 34.0 PG    MCHC 34.7 30.0 - 36.5 g/dL    RDW 12.6 11.5 - 14.5 %    PLATELET 182 (L) 873 - 400 K/uL    MPV 9.0 8.9 - 12.9 FL    NRBC 0.0 0  WBC    ABSOLUTE NRBC 0.00 0.00 - 0.01 K/uL    NEUTROPHILS 84 (H) 32 - 75 %    BAND NEUTROPHILS 0 0 - 6 %    LYMPHOCYTES 12 12 - 49 %    MONOCYTES 4 (L) 5 - 13 %    EOSINOPHILS 0 0 - 7 %    BASOPHILS 0 0 - 1 %    METAMYELOCYTES 0 0 %    MYELOCYTES 0 0 %    PROMYELOCYTES 0 0 %    BLASTS 0 0 %    OTHER CELL 0 0      IMMATURE GRANULOCYTES 0 %    ABS. NEUTROPHILS 2.1 1.8 - 8.0 K/UL    ABS. LYMPHOCYTES 0.3 (L) 0.8 - 3.5 K/UL    ABS. MONOCYTES 0.1 0.0 - 1.0 K/UL    ABS. EOSINOPHILS 0.0 0.0 - 0.4 K/UL    ABS. BASOPHILS 0.0 0.0 - 0.1 K/UL    ABS. IMM.  GRANS. 0.0 K/UL    DF MANUAL      RBC COMMENTS NORMOCYTIC, NORMOCHROMIC      WBC COMMENTS FEW     METABOLIC PANEL, COMPREHENSIVE    Collection Time: 09/18/18  5:23 AM   Result Value Ref Range    Sodium 137 136 - 145 mmol/L    Potassium 3.1 (L) 3.5 - 5.1 mmol/L    Chloride 100 97 - 108 mmol/L    CO2 29 21 - 32 mmol/L    Anion gap 8 5 - 15 mmol/L    Glucose 87 65 - 100 mg/dL    BUN 8 6 - 20 MG/DL    Creatinine 1.47 (H) 0.70 - 1.30 MG/DL    BUN/Creatinine ratio 5 (L) 12 - 20      GFR est AA >60 >60 ml/min/1.73m2    GFR est non-AA 53 (L) >60 ml/min/1.73m2    Calcium 7.1 (L) 8.5 - 10.1 MG/DL    Bilirubin, total 0.3 0.2 - 1.0 MG/DL    ALT (SGPT) 24 12 - 78 U/L    AST (SGOT) 13 (L) 15 - 37 U/L    Alk. phosphatase 44 (L) 45 - 117 U/L    Protein, total 6.2 (L) 6.4 - 8.2 g/dL    Albumin 2.5 (L) 3.5 - 5.0 g/dL    Globulin 3.7 2.0 - 4.0 g/dL    A-G Ratio 0.7 (L) 1.1 - 2.2     MAGNESIUM    Collection Time: 09/18/18  5:23 AM   Result Value Ref Range    Magnesium 1.5 (L) 1.6 - 2.4 mg/dL       Assessment and Plan:    left tonsillar carcinoma     S/p chemo /RT  S/p C2 of cisplatin  Currently on hold  Transfer to [facility] when bed available  Severe mucositis     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 PEG tube feedings as tolerated. Symptomatic meds - magic mouthwash, viscous lidocaine, morphine as tolerated. pain control - doing better with  Morphine, magic mouthwash, viscous lidocaine.      Nausea - on zofran    Hypomagnesemia from Mag wasting due to Cisplatin:  -replete with 4 grams today    Hypokalemia:  - give 4 K runs today for K=3.1

## 2018-09-18 NOTE — DISCHARGE SUMMARY
[facility] DISCHARGE SUMMARY Bandar Eason 
MR#: [id number] : 1975 ACCOUNT #: [de-identified] ADMIT DATE: 09/10/2018 DISCHARGE DATE:  
 
ADMISSION DIAGNOSES:  Mucositis, chemotherapy-induced nausea and vomiting. ADMISSION NOTE:  Please see admission note from 09/10/2018. HOSPITAL COURSE:  Patient has been admitted to the hospital for severe mucositis. He continues on fluids, PEG tube feeding, symptomatic meds. We are having replete his magnesium and potassium while he has been here. We now have a bed at [facility] and he will be discharged there for further treatment. His chemo and radiation therapy are on hold for right now. He will need to follow up with Dr. Jackie Rogers once discharged. DISCHARGE CONDITION:  Stable. DISPOSITION:  To [facility]. MEDICATIONS:  Please see [facility]. MD DYLAN Anaya/JOSE LUIS 
D: 2018 12:25    
T: 2018 20:34 
JOB #: [id number]

## 2018-09-18 NOTE — PROGRESS NOTES
Nutrition Assessment:    INTERVENTIONS/RECOMMENDATIONS:   EN:  Continue to progress TwoCal TFs towards goal rate of 50 ml/hr + continue 150 ml free water q3h. At goal TF will provide daily approx. 2400 kcals/100 g protein/2040 ml free water. Pt reports that he does not usually eat any of the clear liquids but hesitant to cancel. Will continue for now as desired. ASSESSMENT:   9/18:  Chart reviewed; visited with pt at bedside and checked the rate of the TF. TwoCal currently infusing at 40 ml/hr + 150 ml free water q3h. Pt reports tolerating and feeling better. Less nausea. Pt/family did not have any questions/concerns related to nutrition. The mother commented \"thanks for everything; this hospital has been great\". TwoCal @ 40 ml/hr provides daily approx. 1920 kcals/80g protein/1872 ml free water. Plans are to continue to progress towards goal of 50 ml/hr. Pt will be transferred to [facility] once bed available. 9/17:  Chart reviewed; pt's tube feedings were resumed - TwoCal @ 30 ml/hr + 150 ml free water. Pt may be transferred to [address] [place] [address] within the next 24 hours.      9/14: Chart reviewed and pt discussed with RN. Pt was on continuous TF all yesterday, TwoCal @ 30 ml/hr (unbale to reach goal of 50 ml/hr). He had 2 episodes of emesis late last night at which time he asked to discontinue TF. Pt was fearful to initiate continuous TF due to nausea and vomiting, now I am worried that he will not let us restart them. Will give the day off of feeding and visit with him in the morning. No recent bowel movement documented and flowsheet note mentions pt is uncertain of his last one. Could this be contributing to his nausea and vomiting?  He has blood tinged secretion    Diet Order: Clear liquids  % Eaten:  No data found.     Pertinent Medications: [x] Reviewed []Other:  Pertinent Labs: [x]Reviewed  []Other:  Food Allergies: [x]None []Other:     Last BM: 9/16 loose   [x]Active     []Hyperactive  []Hypoactive [] Absent  BS  Skin:    [x] Intact   [] Incision  [] Breakdown   []Edema   []Other:    Anthropometrics: Height: 5' 9\" (175.3 cm) Weight: 123.7 kg (272 lb 11.3 oz)    IBW (%IBW):   ( ) UBW (%UBW):   (  %)    BMI: Body mass index is 40.27 kg/(m^2). This BMI is indicative of:  []Underweight   []Normal   []Overweight   [] Obesity   [x] Extreme Obesity (BMI>40)  Last Weight Metrics:  Weight Loss Metrics 9/11/2018 8/29/2018 7/19/2018 6/17/2018 5/30/2018 4/24/2018 4/2/2018   Today's Wt 272 lb 11.3 oz 286 lb 8 oz 313 lb 8 oz 343 lb 343 lb 333 lb 335 lb   BMI 40.27 kg/m2 42.31 kg/m2 46.3 kg/m2 50.65 kg/m2 50.65 kg/m2 49.18 kg/m2 49.47 kg/m2       Estimated Nutrition Needs (Based on): 0906 Kcals/day (MSJL: 2180 x 1.2) , 105 g (0.8 g/kg) Protein  Carbohydrate: At Least 130 g/day  Fluids: 2000 mL/day    NUTRITION DIAGNOSES:   Problem:  Less than optimal enteral nutrition      Etiology: related to pt refusing TF     Signs/Symptoms: as evidenced by minimal TF intake since admission    Previous Nutrition Dx:  [] Resolved  [] Unresolved           [x] Progressing    NUTRITION INTERVENTIONS:  Meals/Snacks: General/healthful diet Enteral/Parenteral Nutrition: Initiate enteral nutrition Supplements: Commercial supplement              GOAL:   tolerate TF in 1-3 days    NUTRITION MONITORING AND EVALUATION      Food/Nutrient Intake Outcomes:  Total energy intake, Enteral/parenteral nutrition intake  Physical Signs/Symptoms Outcomes: Weight/weight change, Electrolyte and renal profile, GI, Glucose profile    Previous Goal Met:   [] Met              [x] Progressing Towards Goal              [] Not Progressing Towards Goal   Previous Recommendations:   [x] Implemented          [] Not Implemented          [] Not Applicable    LEARNING NEEDS (Diet, Food/Nutrient-Drug Interaction):    [x] None Identified   [] Identified and Education Provided/Documented   [] Identified and Pt declined/was not appropriate     Cultural, Rastafari, OR Ethnic Dietary Needs:    [x] None Identified   [] Identified and Addressed     [x] Interdisciplinary Care Plan Reviewed/Documented    [x] Discharge Planning:  Continue TwoCal @ goal 50 ml/hr + 150 ml free water q3h   [] Participated in Interdisciplinary Rounds    NUTRITION RISK:    [x] Patient At Nutritional Risk    [] Patient Not At [address] [place] [address], [address] [place] [address]  Pager [phone]  Weekend Pager [phone]

## 2018-09-18 NOTE — PROGRESS NOTES
Problem: Discharge Planning  Goal: *Discharge to safe environment  Outcome: Progressing Towards Goal  [place] [facility] [place]: [place] - Risk of  Goal: *Absence of Falls  Document Jason Fall Risk and appropriate interventions in the flowsheet.    Fall Risk Interventions:  Mobility Interventions: Communicate number of staff needed for ambulation/transfer         Medication Interventions: Patient to call before getting OOB    Elimination Interventions: Call light in reach, Urinal in reach

## 2018-09-18 NOTE — PROGRESS NOTES
Oncology Interdisciplinary rounds were held today to discuss patient plan of care and outcomes. The following members were present: Nursing, Case Management, Pharmacy and Dietary.     Actual Length of Stay: 8    DRG GLOS: 2.3    Expected Length of Stay: 2d 7h                       Plan               Mobility         Discharge Plan   Waiting on [facility] bed  Up with assistance  pT/oT EMTalA to [facility]

## 2018-09-18 NOTE — ADT AUTH CERT NOTES
Vomiting - Care Day 7 (9/16/2018) by Kimber Singh RN        Review Status Review Entered       Completed 9/17/2018       Details              Care Day: 7 Care Date: 9/16/2018 Level of Care: Inpatient Floor       Guideline Day 2        Clinical Status       (X) * Hemodynamic stability       (X) * Vomiting absent or controlled       9/17/2018 3:35 PM EDT by Sravan York         zofran 8mg IV PRN x 1,compazine 10mg IV PRN x 1,              ( ) * Electrolyte levels adequate       (X) * Life-threatening causes of vomiting excluded       9/17/2018 3:35 PM EDT by Sravan York         none noted              (X) * Pain absent or managed       9/17/2018 3:35 PM EDT by Sravan York         morphine 3mg IV PRN x 5,              ( ) * Discharge plans and education understood              Activity       (X) * Ambulatory       9/17/2018 3:35 PM EDT by Sravan York         amb with assist                     Routes       (X) * Oral hydration       9/17/2018 3:35 PM EDT by Sravan York         clear liquid tube feeding with tray              (X) * Liquid or advanced diet       9/17/2018 3:35 PM EDT by Sravan York         clear liquid tube feeding with tray              (X) Oral medications       9/17/2018 3:35 PM EDT by Sravan York         neurontin 300mg PO q8h, lidocaine 2% PO PRN x 2, magic mouthwash PO QID, nystatin 500,000u QID, oxycodone 15mg gtube PRN x 2,                     Medications       (X) Possible antiemetics       9/17/2018 3:35 PM EDT by Sravan York         zofran 8mg IV PRN x 1,compazine 10mg IV PRN x 1,                     9/17/2018 3:35 PM EDT by Sravan York       Subject: Additional Clinical Information       9/16/18VS: 97.3, 81, 18, 162/91, 100%Meds: NS 125ml/hr IV cont, fentanyl patch q72h,   protonix 40mg IV QD, Plan:   IVF, IV antiemetics, IV pain meds, CBC/CMP daily, xfr to [facility] when bed available, SCDs, I/O, speech therapy, HemeOnc: severe soreness of mouth and neck discomfortOn TF  left tonsillar carcinoma    S/p chemo /RTS/p C2 of cisplatinCurrently [facility]  Severe mucositis    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 PEG tube feedings as tolerated.  Symptomatic meds - magic mouthwash, viscous lidocaine, morphine as tolerated.    pain control - doing better with   Morphine, magic mouthwash, viscous lidocaine.    Nausea - on zofran                                   * Milestone                  Vomiting - Care Day 6 (9/15/2018) by Smita Cortes RN        Review Status Review Entered       Completed 9/17/2018       Details              Care Day: 6 Care Date: 9/15/2018 Level of Care: Inpatient Floor       Guideline Day 2        Clinical Status       (X) * Hemodynamic stability       9/17/2018 2:41 PM EDT by Venita Bang         VS: 99.4, 95, 18, 118/88, 98%              (X) * Vomiting absent or controlled       9/17/2018 2:41 PM EDT by Venita Bang         none noted              ( ) * Electrolyte levels adequate       (X) * Life-threatening causes of vomiting excluded       9/17/2018 2:41 PM EDT by Venita Bang         none noted              (X) * Pain absent or managed       9/17/2018 2:41 PM EDT by Venita Bang         morphine 3mg IV PRN x 3,              ( ) * Discharge plans and education understood              Activity       (X) * Ambulatory       9/17/2018 2:41 PM EDT by Christopher partida with assist,                     Routes       (X) * Oral hydration       9/17/2018 2:41 PM EDT by Venita Bang         clear liquid diet tube feeding with tray              (X) * Liquid or advanced diet       9/17/2018 2:41 PM EDT by Venita Bang         clear liquid diet tube feeding with tray              (X) Oral medications       9/17/2018 2:41 PM EDT by Venita Bang         neurontin 300mg PO q8h, lidocaine 2% 15ml PO PRN x 1, magic mouthwash PO QID, nystatin 500,000u PO QID, oxycodone 10mg PRN gtube x 1,miralax gtube QD,                     9/17/2018 2:41 PM EDT by Mehreen Leonard       Subject: Additional Clinical Information       9/15/18VS: 99.4, 95, 18, 118/88, 98%Abnl labs: creat 1.80, Ca 7.9, Meds: NS 125ml/hr IV cont,   protonix 40mg IV QD,PLan:   IVF, IV pain meds, SCDs, I/O, speech therapy, [facility] to [facility] when bed avail, HemeOnc: left tonsillar carcinoma   S/p chemo /RT  Severe mucositis    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 PEG tube feedings as tolerated. Symptomatic meds - magic mouthwash, viscous lidocaine, morphine as tolerated. Hypokalemia, hypomagnesemia - potassium and magnesiumAcute renal failure - creatinine improving with hydration.  Will continue at 125 ml/hr   since po intake remains minimal.    pain control - doing better with   Morphine, magic mouthwash, viscous lidocaine.    Nausea - on zofran                                   * Milestone

## 2018-09-19 LAB
ALBUMIN SERPL-MCNC: 2.7 G/DL (ref 3.5–5)
ALBUMIN/GLOB SERPL: 0.7 {RATIO} (ref 1.1–2.2)
ALP SERPL-CCNC: 47 U/L (ref 45–117)
ALT SERPL-CCNC: 24 U/L (ref 12–78)
ANION GAP SERPL CALC-SCNC: 4 MMOL/L (ref 5–15)
AST SERPL-CCNC: 12 U/L (ref 15–37)
BASOPHILS # BLD: 0 K/UL (ref 0–0.1)
BASOPHILS NFR BLD: 0 % (ref 0–1)
BILIRUB SERPL-MCNC: 0.3 MG/DL (ref 0.2–1)
BLASTS NFR BLD MANUAL: 0 %
BUN SERPL-MCNC: 8 MG/DL (ref 6–20)
BUN/CREAT SERPL: 5 (ref 12–20)
CALCIUM SERPL-MCNC: 7.4 MG/DL (ref 8.5–10.1)
CHLORIDE SERPL-SCNC: 100 MMOL/L (ref 97–108)
CO2 SERPL-SCNC: 31 MMOL/L (ref 21–32)
CREAT SERPL-MCNC: 1.47 MG/DL (ref 0.7–1.3)
DIFFERENTIAL METHOD BLD: ABNORMAL
EOSINOPHIL # BLD: 0.1 K/UL (ref 0–0.4)
EOSINOPHIL NFR BLD: 2 % (ref 0–7)
ERYTHROCYTE [DISTWIDTH] IN BLOOD BY AUTOMATED COUNT: 12.8 % (ref 11.5–14.5)
GLOBULIN SER CALC-MCNC: 3.7 G/DL (ref 2–4)
GLUCOSE SERPL-MCNC: 87 MG/DL (ref 65–100)
HCT VFR BLD AUTO: 25.4 % (ref 36.6–50.3)
HGB BLD-MCNC: 8.8 G/DL (ref 12.1–17)
IMM GRANULOCYTES # BLD: 0 K/UL
IMM GRANULOCYTES NFR BLD AUTO: 0 %
LYMPHOCYTES # BLD: 0.1 K/UL (ref 0.8–3.5)
LYMPHOCYTES NFR BLD: 4 % (ref 12–49)
MAGNESIUM SERPL-MCNC: 1.8 MG/DL (ref 1.6–2.4)
MCH RBC QN AUTO: 28.8 PG (ref 26–34)
MCHC RBC AUTO-ENTMCNC: 34.6 G/DL (ref 30–36.5)
MCV RBC AUTO: 83 FL (ref 80–99)
METAMYELOCYTES NFR BLD MANUAL: 0 %
MONOCYTES # BLD: 0.1 K/UL (ref 0–1)
MONOCYTES NFR BLD: 4 % (ref 5–13)
MYELOCYTES NFR BLD MANUAL: 0 %
NEUTS BAND NFR BLD MANUAL: 2 % (ref 0–6)
NEUTS SEG # BLD: 2.3 K/UL (ref 1.8–8)
NEUTS SEG NFR BLD: 88 % (ref 32–75)
NRBC # BLD: 0 K/UL (ref 0–0.01)
NRBC BLD-RTO: 0 PER 100 WBC
OTHER CELLS NFR BLD MANUAL: 0 %
PLATELET # BLD AUTO: 134 K/UL (ref 150–400)
PMV BLD AUTO: 8.8 FL (ref 8.9–12.9)
POTASSIUM SERPL-SCNC: 3.3 MMOL/L (ref 3.5–5.1)
PROMYELOCYTES NFR BLD MANUAL: 0 %
PROT SERPL-MCNC: 6.4 G/DL (ref 6.4–8.2)
RBC # BLD AUTO: 3.06 M/UL (ref 4.1–5.7)
RBC MORPH BLD: ABNORMAL
SODIUM SERPL-SCNC: 135 MMOL/L (ref 136–145)
TOTAL CELLS COUNTED SPEC: 50
WBC # BLD AUTO: 2.6 K/UL (ref 4.1–11.1)

## 2018-09-19 PROCEDURE — 85027 COMPLETE CBC AUTOMATED: CPT | Performed by: INTERNAL MEDICINE

## 2018-09-19 PROCEDURE — 74011250636 HC RX REV CODE- 250/636: Performed by: INTERNAL MEDICINE

## 2018-09-19 PROCEDURE — 83735 ASSAY OF MAGNESIUM: CPT | Performed by: INTERNAL MEDICINE

## 2018-09-19 PROCEDURE — 74011250637 HC RX REV CODE- 250/637: Performed by: INTERNAL MEDICINE

## 2018-09-19 PROCEDURE — 65270000015 HC RM PRIVATE ONCOLOGY

## 2018-09-19 PROCEDURE — 74011000250 HC RX REV CODE- 250: Performed by: INTERNAL MEDICINE

## 2018-09-19 PROCEDURE — C9113 INJ PANTOPRAZOLE SODIUM, VIA: HCPCS | Performed by: INTERNAL MEDICINE

## 2018-09-19 PROCEDURE — 77386 HC IMRT TRMT DLVR COMPL: CPT

## 2018-09-19 PROCEDURE — 77030018798 HC PMP KT ENTRL FED COVD -A

## 2018-09-19 PROCEDURE — 36415 COLL VENOUS BLD VENIPUNCTURE: CPT | Performed by: INTERNAL MEDICINE

## 2018-09-19 PROCEDURE — 80053 COMPREHEN METABOLIC PANEL: CPT | Performed by: INTERNAL MEDICINE

## 2018-09-19 RX ORDER — POTASSIUM CHLORIDE 7.45 MG/ML
10 INJECTION INTRAVENOUS
Status: COMPLETED | OUTPATIENT
Start: 2018-09-19 | End: 2018-09-20

## 2018-09-19 RX ADMIN — ONDANSETRON 8 MG: 2 INJECTION INTRAMUSCULAR; INTRAVENOUS at 17:26

## 2018-09-19 RX ADMIN — OXYCODONE HYDROCHLORIDE 15 MG: 5 TABLET ORAL at 23:03

## 2018-09-19 RX ADMIN — POTASSIUM CHLORIDE 10 MEQ: 10 INJECTION, SOLUTION INTRAVENOUS at 14:12

## 2018-09-19 RX ADMIN — MORPHINE SULFATE 3 MG: 10 INJECTION INTRAVENOUS at 05:50

## 2018-09-19 RX ADMIN — POTASSIUM CHLORIDE 10 MEQ: 10 INJECTION, SOLUTION INTRAVENOUS at 11:18

## 2018-09-19 RX ADMIN — ONDANSETRON 8 MG: 2 INJECTION INTRAMUSCULAR; INTRAVENOUS at 08:37

## 2018-09-19 RX ADMIN — GABAPENTIN 300 MG: 250 SUSPENSION ORAL at 23:03

## 2018-09-19 RX ADMIN — MORPHINE SULFATE 3 MG: 10 INJECTION INTRAVENOUS at 14:12

## 2018-09-19 RX ADMIN — OXYCODONE HYDROCHLORIDE 15 MG: 5 TABLET ORAL at 17:26

## 2018-09-19 RX ADMIN — MORPHINE SULFATE 3 MG: 10 INJECTION INTRAVENOUS at 02:40

## 2018-09-19 RX ADMIN — SODIUM CHLORIDE 125 ML/HR: 900 INJECTION, SOLUTION INTRAVENOUS at 00:20

## 2018-09-19 RX ADMIN — MORPHINE SULFATE 3 MG: 10 INJECTION INTRAVENOUS at 08:37

## 2018-09-19 RX ADMIN — LIDOCAINE HYDROCHLORIDE 5 ML: 20 SOLUTION ORAL; TOPICAL at 17:19

## 2018-09-19 RX ADMIN — LIDOCAINE HYDROCHLORIDE 5 ML: 20 SOLUTION ORAL; TOPICAL at 21:30

## 2018-09-19 RX ADMIN — NYSTATIN 500000 UNITS: 100000 SUSPENSION ORAL at 17:20

## 2018-09-19 RX ADMIN — GABAPENTIN 300 MG: 250 SUSPENSION ORAL at 08:22

## 2018-09-19 RX ADMIN — POTASSIUM CHLORIDE 10 MEQ: 10 INJECTION, SOLUTION INTRAVENOUS at 09:56

## 2018-09-19 RX ADMIN — NYSTATIN 500000 UNITS: 100000 SUSPENSION ORAL at 08:22

## 2018-09-19 RX ADMIN — GABAPENTIN 300 MG: 250 SUSPENSION ORAL at 15:31

## 2018-09-19 RX ADMIN — PANTOPRAZOLE SODIUM 40 MG: 40 INJECTION, POWDER, FOR SOLUTION INTRAVENOUS at 17:19

## 2018-09-19 RX ADMIN — OXYCODONE HYDROCHLORIDE 10 MG: 5 TABLET ORAL at 17:35

## 2018-09-19 RX ADMIN — LIDOCAINE HYDROCHLORIDE 5 ML: 20 SOLUTION ORAL; TOPICAL at 14:11

## 2018-09-19 RX ADMIN — MORPHINE SULFATE 3 MG: 10 INJECTION INTRAVENOUS at 21:30

## 2018-09-19 RX ADMIN — SODIUM CHLORIDE 125 ML/HR: 900 INJECTION, SOLUTION INTRAVENOUS at 21:36

## 2018-09-19 RX ADMIN — LIDOCAINE HYDROCHLORIDE 15 ML: 20 SOLUTION ORAL; TOPICAL at 17:26

## 2018-09-19 RX ADMIN — LIDOCAINE HYDROCHLORIDE 5 ML: 20 SOLUTION ORAL; TOPICAL at 08:22

## 2018-09-19 RX ADMIN — POTASSIUM CHLORIDE 10 MEQ: 10 INJECTION, SOLUTION INTRAVENOUS at 08:19

## 2018-09-19 RX ADMIN — ONDANSETRON 8 MG: 2 INJECTION INTRAMUSCULAR; INTRAVENOUS at 22:55

## 2018-09-19 RX ADMIN — NYSTATIN 500000 UNITS: 100000 SUSPENSION ORAL at 14:11

## 2018-09-19 RX ADMIN — NYSTATIN 500000 UNITS: 100000 SUSPENSION ORAL at 21:30

## 2018-09-19 NOTE — PROGRESS NOTES
Oncology Nursing Communication Tool  8:08 PM  9/18/2018     Bedside shift change report given to Kilo Leon RN (incoming nurse) by Zaynab Anderson (outgoing nurse) on [address] [facility]. Report included the following information SBAR, Kardex and MAR. Shift Summary: heparin drip, pain management, increase in swelling in neck during the shift       Issues for physician to address: N/A       Oncology Shift Note   Admission Date 9/10/2018   Admission Diagnosis nausea, vomiting, dehydration  Intractable nausea and vomiting   Code Status Full Code   Consults IP CONSULT TO RADIATION ONCOLOGY      Cardiac Monitoring [] Yes [] No      Purposeful Hourly Rounding [] Yes    Jason Score Total Score: 3   Jason score 3 or > [] Bed Alarm [] Avasys [] 1:1 sitter [] Patient refused (Place signed refusal form in chart)      Pain Managed [] Yes [] No    Key Pain Meds             oxyCODONE-acetaminophen (PERCOCET) 5-325 mg per tablet  (Taking) Take 1 Tab by mouth every four (4) hours as needed for Pain. Max Daily Amount: 6 Tabs. Influenza Vaccine Received Flu Vaccine for Current Season (usually Sept-March): No    Patient/Guardian Refused (Notify MD): No      Oxygen needs?  [] Room air Oxygen @  []1L    []2L    []3L   []4L    []5L   []6L     Use home O2? [] Yes [] No  Perform O2 challenge test using  smartphrase (.oxygenchallenge)      Last bowel movement Last Bowel Movement Date: 09/16/18  bowel movement      Urinary Catheter             LDAs             Venous Access Device right chest port 09/10/18 Upper chest (subclavicular area, right (Active)   Central Line Being Utilized Yes 9/18/2018  6:59 PM   Criteria for Appropriate Use Limited/no vessel suitable for conventional peripheral access 9/18/2018  1:35 PM   Site Assessment Clean, dry, & intact 9/18/2018  1:35 PM   Date of Last Dressing Change 09/12/18 9/18/2018  3:00 AM   Dressing Status Clean, dry, & intact 9/18/2018  1:35 PM   Dressing Type Disk with Chlorhexadine gluconate (CHG) 9/18/2018  1:35 PM   Action Taken Blood drawn 9/16/2018  2:27 AM   Date Accessed (Medial Site) 09/10/18 9/18/2018  3:00 AM   Positive Blood Return (Medial Site) Yes 9/18/2018  3:00 AM   Action Taken (Medial Site) Flushed; Infusing 9/18/2018  3:00 AM   Alcohol Cap Used Yes 9/18/2018  3:00 AM            PEG/Gastrostomy Tube 09/10/18 (Active)   Site Assessment Clean, dry, & intact 9/18/2018  6:59 PM   Dressing Status Clean, dry, & intact 9/18/2018  8:00 AM   G Port Status Clamped 9/18/2018  3:00 AM   Action Taken Feed set changed 9/17/2018  6:14 PM   Gastric Residual (mL) 0 ml 9/18/2018  3:00 AM   Tube Feeding/Formula Options Twocal HN 9/17/2018  4:07 PM   Tube Feeding/Verify Rate (mL/hr) 40 9/17/2018  6:14 PM   Water Flush Volume (mL) 120 mL 9/17/2018 11:03 PM   Intake (ml) 120 ml 9/17/2018 11:03 PM                   Readmission Risk Assessment Tool Score Low Risk            6       Total Score        3 Has Seen PCP in Last 6 Months (Yes=3, No=0)    3 Patient Length of Stay (>5 days = 3)        Criteria that do not apply:    . Living with Significant Other. Assisted Living. LTAC. SNF. or   Rehab    IP Visits Last 12 Months (1-3=4, 4=9, >4=11)    Pt.  Coverage (Medicare=5 , Medicaid, or Self-Pay=4)    Charlson Comorbidity Score (Age + Comorbid Conditions)       Expected Length of Stay 2d 7h   Actual Length of Stay [address] [facility]

## 2018-09-19 NOTE — CONSULTS
Palliative Medicine Consult  Bill: [phone] ([phone])    Patient Name: Nita Nageotte  YOB: 1975    Received consult  Was unable to see patient today as he was leaving for Radiation  Will follow up tomorrow morning to work with patient on Pain Management reg, and attempt to wean off Viviana Morton NP

## 2018-09-19 NOTE — PROGRESS NOTES
Spoke to patient as he was ambulating in hallway. He continues with significant nausea and reports that this, rather than swallow pain, is main deterrent to PO. I encouraged him to continue small amount of PO even if painful, but not if it is causing vomiting. Will follow up as he is medically able.        Ambrose Staples M.S., [address] [facility]  Speech-Language Pathologist

## 2018-09-19 NOTE — PROGRESS NOTES
Oncology Nursing Communication Tool  0715 AM  9/19/2018     Bedside shift change report given to Dany Eid RN (incoming nurse) by Brian Thakur RN (outgoing nurse) on [address] [facility]. Report included the following information SBAR. Shift Summary: PRN morhpine and rupa for pain, zofran for nausea. Issues for physician to address: [facility] bed placement         Oncology Shift Note   Admission Date 9/10/2018   Admission Diagnosis nausea, vomiting, dehydration  Intractable nausea and vomiting   Code Status Full Code   Consults IP CONSULT TO RADIATION ONCOLOGY      Cardiac Monitoring [] Yes [] No      Purposeful Hourly Rounding [] Yes    Jason Score Total Score: 3   Jason score 3 or > [] Bed Alarm [] Avasys [] 1:1 sitter [] Patient refused (Place signed refusal form in chart)      Pain Managed [] Yes [] No    Key Pain Meds             oxyCODONE-acetaminophen (PERCOCET) 5-325 mg per tablet  (Taking) Take 1 Tab by mouth every four (4) hours as needed for Pain. Max Daily Amount: 6 Tabs. Influenza Vaccine Received Flu Vaccine for Current Season (usually Sept-March): No    Patient/Guardian Refused (Notify MD): No      Oxygen needs?  [] Room air Oxygen @  []1L    []2L    []3L   []4L    []5L   []6L     Use home O2? [] Yes [] No  Perform O2 challenge test using  smartphrase (.oxygenchallenge)      Last bowel movement Last Bowel Movement Date: 09/16/18  bowel movement      Urinary Catheter             LDAs             Venous Access Device right chest port 09/10/18 Upper chest (subclavicular area, right (Active)   Central Line Being Utilized Yes 9/19/2018  3:00 AM   Criteria for Appropriate Use Limited/no vessel suitable for conventional peripheral access 9/19/2018  3:00 AM   Site Assessment Clean, dry, & intact 9/19/2018  3:00 AM   Date of Last Dressing Change 09/12/18 9/18/2018  3:00 AM   Dressing Status Clean, dry, & intact 9/19/2018  3:00 AM   Dressing Type Disk with Chlorhexadine gluconate (CHG);Tape;Transparent 9/19/2018  3:00 AM   Action Taken Blood drawn 9/16/2018  2:27 AM   Date Accessed (Medial Site) 09/10/18 9/18/2018  3:00 AM   Positive Blood Return (Medial Site) Yes 9/19/2018  3:00 AM   Action Taken (Medial Site) Flushed; Infusing 9/19/2018  3:00 AM   Alcohol Cap Used Yes 9/19/2018  3:00 AM            PEG/Gastrostomy Tube 09/10/18 (Active)   Site Assessment Clean, dry, & intact 9/19/2018  3:00 AM   Dressing Status Clean, dry, & intact 9/19/2018  3:00 AM   G Port Status Clamped 9/19/2018  3:00 AM   Action Taken Feed set changed 9/17/2018  6:14 PM   Gastric Residual (mL) 0 ml 9/19/2018  3:00 AM   Tube Feeding/Formula Options Twocal HN 9/18/2018  6:59 PM   Tube Feeding/Verify Rate (mL/hr) 0 9/18/2018 10:35 PM   Water Flush Volume (mL) 120 mL 9/18/2018 10:35 PM   Intake (ml) 120 ml 9/18/2018 10:35 PM                   Readmission Risk Assessment Tool Score Low Risk            6       Total Score        3 Has Seen PCP in Last 6 Months (Yes=3, No=0)    3 Patient Length of Stay (>5 days = 3)        Criteria that do not apply:    . Living with Significant Other. Assisted Living. LTAC. SNF. or   Rehab    IP Visits Last 12 Months (1-3=4, 4=9, >4=11)    Pt.  Coverage (Medicare=5 , Medicaid, or Self-Pay=4)    Charlson Comorbidity Score (Age + Comorbid Conditions)       Expected Length of Stay 2d 7h   Actual Length of Stay 9          Negar Stallings RN

## 2018-09-19 NOTE — PROGRESS NOTES
CM called [address] for bed availability - transfer center currently working on bed status and insurance transfers. Patient needing to stay within [facility] due to medical management/MD.    Transfer Center to call back when bed available.     Kiesrten Jensen, RN, BSN, ACM   - Medical Oncology  [phone]

## 2018-09-20 LAB
ALBUMIN SERPL-MCNC: 2.7 G/DL (ref 3.5–5)
ALBUMIN/GLOB SERPL: 0.8 {RATIO} (ref 1.1–2.2)
ALP SERPL-CCNC: 48 U/L (ref 45–117)
ALT SERPL-CCNC: 22 U/L (ref 12–78)
ANION GAP SERPL CALC-SCNC: 10 MMOL/L (ref 5–15)
AST SERPL-CCNC: 15 U/L (ref 15–37)
BASOPHILS # BLD: 0 K/UL (ref 0–0.1)
BASOPHILS NFR BLD: 0 % (ref 0–1)
BILIRUB SERPL-MCNC: 0.3 MG/DL (ref 0.2–1)
BLASTS NFR BLD MANUAL: 0 %
BUN SERPL-MCNC: 9 MG/DL (ref 6–20)
BUN/CREAT SERPL: 6 (ref 12–20)
CALCIUM SERPL-MCNC: 7.6 MG/DL (ref 8.5–10.1)
CHLORIDE SERPL-SCNC: 100 MMOL/L (ref 97–108)
CO2 SERPL-SCNC: 27 MMOL/L (ref 21–32)
CREAT SERPL-MCNC: 1.51 MG/DL (ref 0.7–1.3)
DIFFERENTIAL METHOD BLD: ABNORMAL
EOSINOPHIL # BLD: 0 K/UL (ref 0–0.4)
EOSINOPHIL NFR BLD: 2 % (ref 0–7)
ERYTHROCYTE [DISTWIDTH] IN BLOOD BY AUTOMATED COUNT: 12.9 % (ref 11.5–14.5)
GLOBULIN SER CALC-MCNC: 3.6 G/DL (ref 2–4)
GLUCOSE SERPL-MCNC: 82 MG/DL (ref 65–100)
HCT VFR BLD AUTO: 24.7 % (ref 36.6–50.3)
HGB BLD-MCNC: 8.3 G/DL (ref 12.1–17)
IMM GRANULOCYTES # BLD: 0 K/UL
IMM GRANULOCYTES NFR BLD AUTO: 0 %
LYMPHOCYTES # BLD: 0.4 K/UL (ref 0.8–3.5)
LYMPHOCYTES NFR BLD: 21 % (ref 12–49)
MAGNESIUM SERPL-MCNC: 1.5 MG/DL (ref 1.6–2.4)
MCH RBC QN AUTO: 28.3 PG (ref 26–34)
MCHC RBC AUTO-ENTMCNC: 33.6 G/DL (ref 30–36.5)
MCV RBC AUTO: 84.3 FL (ref 80–99)
METAMYELOCYTES NFR BLD MANUAL: 0 %
MONOCYTES # BLD: 0.4 K/UL (ref 0–1)
MONOCYTES NFR BLD: 19 % (ref 5–13)
MYELOCYTES NFR BLD MANUAL: 1 %
NEUTS BAND NFR BLD MANUAL: 0 % (ref 0–6)
NEUTS SEG # BLD: 1.1 K/UL (ref 1.8–8)
NEUTS SEG NFR BLD: 57 % (ref 32–75)
NRBC # BLD: 0 K/UL (ref 0–0.01)
NRBC BLD-RTO: 0 PER 100 WBC
OTHER CELLS NFR BLD MANUAL: 0 %
PLATELET # BLD AUTO: 142 K/UL (ref 150–400)
PMV BLD AUTO: 9 FL (ref 8.9–12.9)
POTASSIUM SERPL-SCNC: 3.3 MMOL/L (ref 3.5–5.1)
PROMYELOCYTES NFR BLD MANUAL: 0 %
PROT SERPL-MCNC: 6.3 G/DL (ref 6.4–8.2)
RBC # BLD AUTO: 2.93 M/UL (ref 4.1–5.7)
RBC MORPH BLD: ABNORMAL
SODIUM SERPL-SCNC: 137 MMOL/L (ref 136–145)
WBC # BLD AUTO: 2 K/UL (ref 4.1–11.1)

## 2018-09-20 PROCEDURE — 74011250636 HC RX REV CODE- 250/636: Performed by: INTERNAL MEDICINE

## 2018-09-20 PROCEDURE — 77386 HC IMRT TRMT DLVR COMPL: CPT

## 2018-09-20 PROCEDURE — 65270000015 HC RM PRIVATE ONCOLOGY

## 2018-09-20 PROCEDURE — 36415 COLL VENOUS BLD VENIPUNCTURE: CPT | Performed by: INTERNAL MEDICINE

## 2018-09-20 PROCEDURE — 83735 ASSAY OF MAGNESIUM: CPT | Performed by: INTERNAL MEDICINE

## 2018-09-20 PROCEDURE — 74011250637 HC RX REV CODE- 250/637: Performed by: INTERNAL MEDICINE

## 2018-09-20 PROCEDURE — C9113 INJ PANTOPRAZOLE SODIUM, VIA: HCPCS | Performed by: INTERNAL MEDICINE

## 2018-09-20 PROCEDURE — 80053 COMPREHEN METABOLIC PANEL: CPT | Performed by: INTERNAL MEDICINE

## 2018-09-20 PROCEDURE — 74011250637 HC RX REV CODE- 250/637: Performed by: NURSE PRACTITIONER

## 2018-09-20 PROCEDURE — 77014 HC CT SCAN FOR THERAPY GUIDE: CPT

## 2018-09-20 PROCEDURE — 76450000000

## 2018-09-20 PROCEDURE — 85027 COMPLETE CBC AUTOMATED: CPT | Performed by: INTERNAL MEDICINE

## 2018-09-20 PROCEDURE — 74011250636 HC RX REV CODE- 250/636: Performed by: NURSE PRACTITIONER

## 2018-09-20 RX ORDER — OXYCODONE HCL 20 MG/ML
20 CONCENTRATE, ORAL ORAL
Status: DISCONTINUED | OUTPATIENT
Start: 2018-09-20 | End: 2018-09-25 | Stop reason: HOSPADM

## 2018-09-20 RX ORDER — POTASSIUM CHLORIDE 7.45 MG/ML
10 INJECTION INTRAVENOUS
Status: DISPENSED | OUTPATIENT
Start: 2018-09-20 | End: 2018-09-20

## 2018-09-20 RX ORDER — POTASSIUM CHLORIDE 7.45 MG/ML
10 INJECTION INTRAVENOUS
Status: COMPLETED | OUTPATIENT
Start: 2018-09-20 | End: 2018-09-24

## 2018-09-20 RX ORDER — MAGNESIUM SULFATE HEPTAHYDRATE 40 MG/ML
2 INJECTION, SOLUTION INTRAVENOUS ONCE
Status: COMPLETED | OUTPATIENT
Start: 2018-09-20 | End: 2018-09-20

## 2018-09-20 RX ORDER — MORPHINE SULFATE 10 MG/ML
3 INJECTION, SOLUTION INTRAMUSCULAR; INTRAVENOUS
Status: DISCONTINUED | OUTPATIENT
Start: 2018-09-20 | End: 2018-09-25 | Stop reason: HOSPADM

## 2018-09-20 RX ADMIN — PANTOPRAZOLE SODIUM 40 MG: 40 INJECTION, POWDER, FOR SOLUTION INTRAVENOUS at 17:26

## 2018-09-20 RX ADMIN — NYSTATIN 500000 UNITS: 100000 SUSPENSION ORAL at 21:17

## 2018-09-20 RX ADMIN — LIDOCAINE HYDROCHLORIDE 5 ML: 20 SOLUTION ORAL; TOPICAL at 17:32

## 2018-09-20 RX ADMIN — GABAPENTIN 300 MG: 250 SUSPENSION ORAL at 13:07

## 2018-09-20 RX ADMIN — MORPHINE SULFATE 3 MG: 10 INJECTION INTRAVENOUS at 10:51

## 2018-09-20 RX ADMIN — MORPHINE SULFATE 3 MG: 10 INJECTION INTRAVENOUS at 01:44

## 2018-09-20 RX ADMIN — POTASSIUM CHLORIDE 10 MEQ: 10 INJECTION, SOLUTION INTRAVENOUS at 12:10

## 2018-09-20 RX ADMIN — MORPHINE SULFATE 3 MG: 10 INJECTION INTRAVENOUS at 08:16

## 2018-09-20 RX ADMIN — POTASSIUM CHLORIDE 10 MEQ: 10 INJECTION, SOLUTION INTRAVENOUS at 14:31

## 2018-09-20 RX ADMIN — MAGNESIUM SULFATE HEPTAHYDRATE 2 G: 40 INJECTION, SOLUTION INTRAVENOUS at 10:57

## 2018-09-20 RX ADMIN — NYSTATIN 500000 UNITS: 100000 SUSPENSION ORAL at 11:08

## 2018-09-20 RX ADMIN — OXYCODONE HYDROCHLORIDE 20 MG: 100 SOLUTION ORAL at 17:25

## 2018-09-20 RX ADMIN — OXYCODONE HYDROCHLORIDE 20 MG: 100 SOLUTION ORAL at 13:17

## 2018-09-20 RX ADMIN — LIDOCAINE HYDROCHLORIDE 5 ML: 20 SOLUTION ORAL; TOPICAL at 21:17

## 2018-09-20 RX ADMIN — NYSTATIN 500000 UNITS: 100000 SUSPENSION ORAL at 13:10

## 2018-09-20 RX ADMIN — LIDOCAINE HYDROCHLORIDE 5 ML: 20 SOLUTION ORAL; TOPICAL at 11:08

## 2018-09-20 RX ADMIN — POTASSIUM CHLORIDE 10 MEQ: 10 INJECTION, SOLUTION INTRAVENOUS at 16:12

## 2018-09-20 RX ADMIN — POTASSIUM CHLORIDE 10 MEQ: 10 INJECTION, SOLUTION INTRAVENOUS at 17:32

## 2018-09-20 RX ADMIN — OXYCODONE HYDROCHLORIDE 20 MG: 100 SOLUTION ORAL at 21:20

## 2018-09-20 RX ADMIN — GABAPENTIN 300 MG: 250 SUSPENSION ORAL at 21:19

## 2018-09-20 RX ADMIN — NYSTATIN 500000 UNITS: 100000 SUSPENSION ORAL at 17:25

## 2018-09-20 RX ADMIN — GABAPENTIN 300 MG: 250 SUSPENSION ORAL at 17:25

## 2018-09-20 RX ADMIN — SODIUM CHLORIDE 125 ML/HR: 900 INJECTION, SOLUTION INTRAVENOUS at 06:35

## 2018-09-20 RX ADMIN — ONDANSETRON 8 MG: 2 INJECTION INTRAMUSCULAR; INTRAVENOUS at 08:16

## 2018-09-20 RX ADMIN — LIDOCAINE HYDROCHLORIDE 5 ML: 20 SOLUTION ORAL; TOPICAL at 14:32

## 2018-09-20 NOTE — PROGRESS NOTES
Hematology Oncology Progress Note       Follow up for: head and neck cancer (left tonsil)        Patient complains of the following:     S: Mouth pain still severe but a little improved overall, not taking anything by mouth      Patient Vitals for the past 24 hrs:   BP Temp Pulse Resp SpO2   09/20/18 0750 138/70 98.3 °F (36.8 °C) 88 - -   09/19/18 2244 131/86 98.5 °F (36.9 °C) 74 18 100 %   09/19/18 1942 126/69 98.2 °F (36.8 °C) 84 18 99 %   09/19/18 1548 121/86 98.4 °F (36.9 °C) 78 18 98 %       Review of Systems:negative except as above    Gen NAD  OP mucositis  CV reg  Lungs clear   abd benign  Ext no edema      Labs:  Recent Results (from the past 24 hour(s))   CBC WITH MANUAL DIFF    Collection Time: 09/20/18  3:38 AM   Result Value Ref Range    WBC 2.0 (L) 4.1 - 11.1 K/uL    RBC 2.93 (L) 4.10 - 5.70 M/uL    HGB 8.3 (L) 12.1 - 17.0 g/dL    HCT 24.7 (L) 36.6 - 50.3 %    MCV 84.3 80.0 - 99.0 FL    MCH 28.3 26.0 - 34.0 PG    MCHC 33.6 30.0 - 36.5 g/dL    RDW 12.9 11.5 - 14.5 %    PLATELET 784 (L) 106 - 400 K/uL    MPV 9.0 8.9 - 12.9 FL    NRBC 0.0 0  WBC    ABSOLUTE NRBC 0.00 0.00 - 0.01 K/uL    NEUTROPHILS PENDING %    LYMPHOCYTES PENDING %    MONOCYTES PENDING %    EOSINOPHILS PENDING %    BASOPHILS PENDING %    IMMATURE GRANULOCYTES PENDING %    BAND NEUTROPHILS PENDING %    PROMYELOCYTES PENDING %    MYELOCYTES PENDING %    METAMYELOCYTES PENDING %    BLASTS PENDING %    OTHER CELL PENDING     ABS. NEUTROPHILS PENDING K/UL    ABS. LYMPHOCYTES PENDING K/UL    ABS. MONOCYTES PENDING K/UL    ABS. EOSINOPHILS PENDING K/UL    ABS. BASOPHILS PENDING K/UL    ABS. IMM. GRANS.  PENDING K/UL    RBC COMMENTS PENDING     DIFFERENTIAL PENDING    METABOLIC PANEL, COMPREHENSIVE    Collection Time: 09/20/18  3:38 AM   Result Value Ref Range    Sodium 137 136 - 145 mmol/L    Potassium 3.3 (L) 3.5 - 5.1 mmol/L    Chloride 100 97 - 108 mmol/L    CO2 27 21 - 32 mmol/L    Anion gap 10 5 - 15 mmol/L    Glucose 82 65 - 100 mg/dL    BUN 9 6 - 20 MG/DL    Creatinine 1.51 (H) 0.70 - 1.30 MG/DL    BUN/Creatinine ratio 6 (L) 12 - 20      GFR est AA >60 >60 ml/min/1.73m2    GFR est non-AA 51 (L) >60 ml/min/1.73m2    Calcium 7.6 (L) 8.5 - 10.1 MG/DL    Bilirubin, total 0.3 0.2 - 1.0 MG/DL    ALT (SGPT) 22 12 - 78 U/L    AST (SGOT) 15 15 - 37 U/L    Alk. phosphatase 48 45 - 117 U/L    Protein, total 6.3 (L) 6.4 - 8.2 g/dL    Albumin 2.7 (L) 3.5 - 5.0 g/dL    Globulin 3.6 2.0 - 4.0 g/dL    A-G Ratio 0.8 (L) 1.1 - 2.2     MAGNESIUM    Collection Time: 09/20/18  3:38 AM   Result Value Ref Range    Magnesium 1.5 (L) 1.6 - 2.4 mg/dL       Assessment and Plan:    left tonsillar carcinoma     S/p chemo /RT  S/p C2 of cisplatin  Currently on hold  Getting XRT  Transfer to [facility] when bed available  Severe mucositis     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 PEG tube feedings as tolerated. Symptomatic meds - magic mouthwash, viscous lidocaine, morphine as tolerated. pain control - Cont with fentanyl patch, Morphine, magic mouthwash, viscous lidocaine.   Ask for palliative care assistance     Nausea - on zofran    One low grade temp  -not neutropenic  -afebrile now  -monitor    Hypokalemia:  - replete    Hypomagnesemia  -replete

## 2018-09-20 NOTE — PROGRESS NOTES
CM contacted [facility] transfer center to enquire about bed availabiltiy. CM was informed that patient had been removed from transfer list due to [facility] being on inpatient diversion. CM contacted Dr. Makenna Vasquez who will speak to transfer center to re-establish need for patient transfer and identify accepting MD for occasion that bed is available. Ayde Vasquez has ordered Palliative Care team consult.     Anjel Tinajero RN, BSN, ACM   - Medical Oncology  [phone]

## 2018-09-20 NOTE — PROGRESS NOTES
Oncology Nursing Communication Tool  7:28 PM  9/20/2018     Bedside shift change report given to Geeta Moody RN (incoming nurse) by David Tong (outgoing nurse) on [address] [facility]. Report included the following information SBAR, Kardex, MAR and Recent Results. Shift Summary:       Issues for physician to address: [facility] transfer         Oncology Shift Note   Admission Date 9/10/2018   Admission Diagnosis nausea, vomiting, dehydration  Intractable nausea and vomiting   Code Status Full Code   Consults IP CONSULT TO RADIATION ONCOLOGY  IP CONSULT TO PALLIATIVE CARE - PROVIDER      Cardiac Monitoring [] Yes [] No      Purposeful Hourly Rounding [] Yes    Jason Score Total Score: 2   Jason score 3 or > [] Bed Alarm [] Avasys [] 1:1 sitter [] Patient refused (Place signed refusal form in chart)      Pain Managed [] Yes [] No    Key Pain Meds             oxyCODONE-acetaminophen (PERCOCET) 5-325 mg per tablet  (Taking) Take 1 Tab by mouth every four (4) hours as needed for Pain. Max Daily Amount: 6 Tabs. Influenza Vaccine Received Flu Vaccine for Current Season (usually Sept-March): No    Patient/Guardian Refused (Notify MD): No      Oxygen needs?  [] Room air Oxygen @  []1L    []2L    []3L   []4L    []5L   []6L     Use home O2? [] Yes [] No  Perform O2 challenge test using  smartphrase (.oxygenchallenge)      Last bowel movement Last Bowel Movement Date: 09/20/18  bowel movement      Urinary Catheter             LDAs             Venous Access Device 09/18/18 Upper chest (subclavicular area, right (Active)   Central Line Being Utilized Yes 9/20/2018  7:06 PM   Criteria for Appropriate Use Limited/no vessel suitable for conventional peripheral access 9/20/2018  3:30 PM   Site Assessment Clean, dry, & intact 9/20/2018  3:30 PM   Date of Last Dressing Change 09/18/18 9/20/2018  3:30 PM   Dressing Status Clean, dry, & intact 9/20/2018  3:30 PM   Dressing Type Disk with Chlorhexadine gluconate (CHG); Transparent 9/20/2018  3:30 PM   Date Accessed (Medial Site) 09/18/18 9/20/2018  3:30 PM   Positive Blood Return (Medial Site) Yes 9/20/2018  3:30 PM   Alcohol Cap Used Yes 9/20/2018  8:37 AM            PEG/Gastrostomy Tube 09/10/18 (Active)   Site Assessment Clean, dry, & intact 9/20/2018  7:06 PM   Dressing Status Clean, dry, & intact 9/20/2018  3:30 PM   G Port Status Infusing 9/20/2018  3:30 PM   Action Taken Other (comment) 9/20/2018  1:00 PM   Gastric Residual (mL) 0 ml 9/19/2018  3:00 AM   Tube Feeding/Formula Options Twocal HN 9/20/2018  3:30 PM   Tube Feeding/Verify Rate (mL/hr) 40 9/20/2018  3:30 PM   Water Flush Volume (mL) 150 mL 9/20/2018  3:30 PM   Intake (ml) 120 ml 9/18/2018 10:35 PM                   Readmission Risk Assessment Tool Score Low Risk            6       Total Score        3 Has Seen PCP in Last 6 Months (Yes=3, No=0)    3 Patient Length of Stay (>5 days = 3)        Criteria that do not apply:    . Living with Significant Other. Assisted Living. LTAC. SNF. or   Rehab    IP Visits Last 12 Months (1-3=4, 4=9, >4=11)    Pt.  Coverage (Medicare=5 , Medicaid, or Self-Pay=4)    Charlson Comorbidity Score (Age + Comorbid Conditions)       Expected Length of Stay 2d 7h   Actual Length of Stay [address] [place] [address]

## 2018-09-20 NOTE — CONSULTS
Palliative Medicine Consult  Bill: [phone] ([id number])    Patient Name: Christina Villegas  YOB: 1975    Date of Initial Consult: 9/19/2018  Reason for Consult: Overwhelming Symptoms  Requesting Provider: Octaviano Edwards MD  Primary Care Physician: Sy Gordon MD     SUMMARY:   Christina Villegas is a 43 y.o. with a past history of tobacco abuse and squamous cell carcinoma of the left tonsil, who was admitted on 9/10/2018 from home with a diagnosis of Dehydration, nausea, vomiting and mucositits. He presented to the oncology clinic after reporting a 2 day history of intractable nausea and vomiting and the inability to take anything by mouth or via PEG tube. Current medical issues leading to Palliative Medicine involvement include: symptom managment       PALLIATIVE DIAGNOSES:   1. Pain in throat  2. Pain in jaw  3. Headache  4. Dysphagia  5. Nausea         PLAN:   1. Met with patient at bedside, to discuss a plan to get him on a pain regimen that prepared him for going home  2. He tells me that he was on oxycodone at home, and it worked pretty well, but when he started having this nausea, and \"heaving\" his pain got worse  3. He is utilizing the IV morphine here, because it works faster, and he does not like how much water he has to have put into his G-Tube with the PO Oxycodone, he feels it makes him sick  4. We talked about a liquid formulation, and how it will be easier, and require less water- he is very open to this idea  5. He feels that the oxycodone works well, it just takes to long to work, and that is why he likes the IV morphine. Given that he is used to this, and had success with it controlling his pain in the past, will stick with oxycodone rather than switching him to PO morphine  6. Encouraged him to use the Oxycodone every 4 hours, and not let his pain get out of control, where he feels he needs the quick relief from the morphine  7.  Encouraged him to utilize the morphine ONLY for pain unrelieved with the oxycodone  8. Stopped Oxycodone 10mg and 15mg tables  9. Started Oxycodone 20mg/1ml Liquid- give 1mg (20mg) every 4 hours as needed for pain  10. Decreased IV Morphine to 3mg every 4 hours as needed for severe pain unrelieved by Oxycodone  11. I don't think we need to increase his Fentanyl patch yet, based on his usage in the past 24 hours, but I will reevaluate this tomorrow after he has a chance to use the liquid Oxycodone  12. He tells me he has no issues with constipation  13. Encouraged him to utilize the zofran or the compazine to see if it helps with the \"heaving\"  14. Initial consult note routed to primary continuity provider  15. Communicated plan of care with: Palliative IDT       GOALS OF CARE / TREATMENT PREFERENCES:     GOALS OF CARE:  Patient/Health Care Proxy Stated Goals: Prolong life      TREATMENT PREFERENCES:   Code Status: Full Code    Advance Care Planning:  Advance Care Planning 9/10/2018   Patient's Healthcare Decision Maker is: Legal Next of Kin   Confirm Advance Directive None       Medical Interventions: Full interventions   Other Instructions: Other:    As far as possible, the palliative care team has discussed with patient / health care proxy about goals of care / treatment preferences for patient.            HISTORY:     History obtained from: patient    CHIEF COMPLAINT: pain in his throat     HPI/SUBJECTIVE:    The patient is:   [x] Verbal and participatory  [] Non-participatory due to:     Pain in throat is at an 8 right now  Gets up to a 10 if he has to swallow or \"heave\"  Feels that the oxycodone brings it down to a 4-5  Is hoping to get to a 3  Tells me the oxycodone helps his pain for a full 4 hours, but he has incidental pain in between if he has to swallow or \"heave\"    Usage:  Morphine IV- 18mg in 24 hours  Oxycodone PO- 40mg in 24 hours    Total Morphine MME in 24 hours= 94mg plus his Fentanyl patch (100mcg)  23.5mg every 4 hours    Started Oxycodone 20mg every 4 hours  Morphine 3mg IV every 4 hours for pain unrelieved with Oxycodone    Clinical Pain Assessment (nonverbal scale for severity on nonverbal patients):   Clinical Pain Assessment  Severity: 8  Location: throat, jaw, radiating to bilateral temporal area/ears/and up through head  Character: sharp, grabbing  Effect: Limits his ability to swallow water or his own secretions  Factors: swallowing makes it worse, feeling like he has to \"heave\" makes it worse  Frequency: constant with intermittent extreme pain          Duration: for how long has pt been experiencing pain (e.g., 2 days, 1 month, years)  Frequency: how often pain is an issue (e.g., several times per day, once every few days, constant)     FUNCTIONAL ASSESSMENT:     Palliative Performance Scale (PPS):  PPS: 80       PSYCHOSOCIAL/SPIRITUAL SCREENING:     Palliative IDT has assessed this patient for cultural preferences / practices and a referral made as appropriate to needs (Cultural Services, Patient Advocacy, Ethics, etc.)    Advance Care Planning:  Advance Care Planning 9/10/2018   Patient's Healthcare Decision Maker is: Legal Next of Kin   Confirm Advance Directive None       Any spiritual / Bahai concerns:  [] Yes /  [x] No    Caregiver Burnout:  [] Yes /  [] No /  [x] No Caregiver Present      Anticipatory grief assessment:   [x] Normal  / [] Maladaptive       ESAS Anxiety: Anxiety: 0    ESAS Depression: Depression: 3        REVIEW OF SYSTEMS:     Positive and pertinent negative findings in ROS are noted above in HPI. The following systems were [x] reviewed / [] unable to be reviewed as noted in HPI  Other findings are noted below. Systems: constitutional, ears/nose/mouth/throat, respiratory, gastrointestinal, genitourinary, musculoskeletal, integumentary, neurologic, psychiatric, endocrine. Positive findings noted below.   Modified ESAS Completed by: provider   Fatigue: 0     Depression: 3 Pain: 8   Anxiety: 0 Nausea: 0 Anorexia: 6 Dyspnea: 0     Constipation: No     Stool Occurrence(s): 1        PHYSICAL EXAM:     From RN flowsheet:  Wt Readings from Last 3 Encounters:   09/11/18 272 lb 11.3 oz (123.7 kg)   08/29/18 286 lb 8 oz (130 kg)   07/19/18 313 lb 8 oz (142.2 kg)     Blood pressure 124/80, pulse 80, temperature 97.8 °F (36.6 °C), resp. rate 18, height 5' 9\" (1.753 m), weight 272 lb 11.3 oz (123.7 kg), SpO2 97 %. Pain Scale 1: Numeric (0 - 10)  Pain Intensity 1: 9  Pain Onset 1: acute  Pain Location 1: Mouth  Pain Orientation 1: Inner  Pain Description 1: Sore  Pain Intervention(s) 1: Medication (see MAR)  Last bowel movement, if known:     Constitutional: Alert, appears uncomfortable, hard to vocalize, using suction often to get rid of secretions  Eyes: pupils equal, anicteric  ENMT: no nasal discharge, moist mucous membranes  Cardiovascular: regular rhythm,   Respiratory: breathing not labored, symmetric  Gastrointestinal: soft non-tender, +bowel sounds  Musculoskeletal: no deformity  Skin: warm, dry  Neurologic: following commands, moving all extremities  Psychiatric: full affect, no hallucinations  Other:       HISTORY:     Principal Problem:    Intractable nausea and vomiting (9/10/2018)    Active Problems:    Esophageal reflux (9/26/2012)      Candidiasis of mouth (9/10/2018)      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 History reviewed, no pertinent family history.   Social History   Substance Use Topics    Smoking status: Former Smoker     Packs/day: 0.50    Smokeless tobacco: Never Used    Alcohol use No     Allergies   Allergen Reactions    Nsaids (Non-Steroidal Anti-Inflammatory Drug) Swelling    Aspirin Angioedema    Ibuprofen Angioedema    Naproxen Angioedema      Current Facility-Administered Medications   Medication Dose Route Frequency    potassium chloride 10 mEq in 100 ml IVPB  10 mEq IntraVENous Q1H    oxyCODONE (ROXICODONE INTENSOL) 20 mg/mL concentrated solution 20 mg  20 mg Per G Tube Q4H PRN    morphine 10 mg/ml injection 3 mg  3 mg IntraVENous Q4H PRN    fentaNYL (DURAGESIC) 100 mcg/hr patch 1 Patch  1 Patch TransDERmal Q72H    magic mouthwash cpd (with sucralfate)  5 mL Oral QID    gabapentin (NEURONTIN) 250 mg/5 mL solution 300 mg  300 mg Oral Q8H    ondansetron (ZOFRAN) injection 8 mg  8 mg IntraVENous Q8H PRN    prochlorperazine (COMPAZINE) with saline injection 10 mg  10 mg IntraVENous Q6H PRN    lidocaine (XYLOCAINE) 2 % viscous solution 15 mL  15 mL Mouth/Throat PRN    LORazepam (ATIVAN) injection 1 mg  1 mg IntraVENous Q4H PRN    0.9% sodium chloride infusion  125 mL/hr IntraVENous CONTINUOUS    acetaminophen (TYLENOL) solution 650 mg  650 mg Oral Q6H PRN    polyethylene glycol (MIRALAX) packet 17 g  17 g Per G Tube DAILY    nystatin (MYCOSTATIN) 100,000 unit/mL oral suspension 500,000 Units  500,000 Units Oral QID    pantoprazole (PROTONIX) injection 40 mg  40 mg IntraVENous Q24H          LAB AND IMAGING FINDINGS:     Lab Results   Component Value Date/Time    WBC 2.0 (L) 09/20/2018 03:38 AM    HGB 8.3 (L) 09/20/2018 03:38 AM    PLATELET 158 (L) 04/11/9772 03:38 AM     Lab Results   Component Value Date/Time    Sodium 137 09/20/2018 03:38 AM    Potassium 3.3 (L) 09/20/2018 03:38 AM    Chloride 100 09/20/2018 03:38 AM    CO2 27 09/20/2018 03:38 AM    BUN 9 09/20/2018 03:38 AM    Creatinine 1.51 (H) 09/20/2018 03:38 AM    Calcium 7.6 (L) 09/20/2018 03:38 AM    Magnesium 1.5 (L) 09/20/2018 03:38 AM      Lab Results   Component Value Date/Time    AST (SGOT) 15 09/20/2018 03:38 AM    Alk.  phosphatase 48 09/20/2018 03:38 AM    Protein, total 6.3 (L) 09/20/2018 03:38 AM Albumin 2.7 (L) 09/20/2018 03:38 AM    Globulin 3.6 09/20/2018 03:38 AM     No results found for: INR, PTMR, PTP, PT1, PT2, APTT   No results found for: IRON, FE, TIBC, IBCT, PSAT, FERR   No results found for: PH, PCO2, PO2  No components found for: GLPOC   No results found for: CPK, CKMB             Total time:   Counseling / coordination time, spent as noted above:   > 50% counseling / coordination?:     Prolonged service was provided for  []30 min   []75 min in face to face time in the presence of the patient, spent as noted above. Time Start:   Time End:   Note: this can only be billed with 74038 (initial) or 86261 (follow up). If multiple start / stop times, list each separately.

## 2018-09-20 NOTE — PROGRESS NOTES
7:15 AM- Bedside report received from [facility], RN    8:50 am- Patient off floor for radiation. Will administer AM medications upon patients arrival back to room. 12:15 PM- Bedside report given to Terry Genao RN.

## 2018-09-20 NOTE — PROGRESS NOTES
Nutrition Assessment:    INTERVENTIONS/RECOMMENDATIONS:   · Continue current diet  · Continue current TF order. Try to get to goal of 50 ml/hr and Encourage pt to leave pump running x 24 hrs. · Could reduce free water flushes to a minimum of 75 ml 6 hrs if IVF is going to continue     ASSESSMENT:   Chart reviewed. Pt reports TF rate was able to advance to 40 ml/hr however he had it discontinued after ~8 hrs. Pt goal is TwoCal @ 50 ml/hr x 24 hrs. We discussed TF needs to run for 24 hrs to meet his nutrition needs. He will try to have it running through the night tonight. He still has fear of emesis. Pump history did not have data saved. If I/O documentation is correct pt has only met 27% of ordered TF in the past 72 hrs. He has not meet his nutrition needs since admission. Diet Order: Clear liquids TwoCal @ 50 ml/hr + 150 ml free water q3h. At goal TF will provide daily approx. 2400 kcals/100 g protein/2040 ml free water.  % Eaten:  No data found. Pertinent Medications: [x]Reviewed: NS @125, zofran, PPI, KCl  Pertinent Labs: [x]Reviewed: K+ 3.3, Mg 1.5,   Food Allergies: [x]NKFA  []Other   Last BM:  9/20  Edema:      []RUE   []LUE   []RLE   []LLE      Pressure Ulcer:      [] Stage I   [] Stage II   [] Stage III   [] Stage IV      Anthropometrics: Height: 5' 9\" (175.3 cm) Weight: 123.7 kg (272 lb 11.3 oz)    IBW (%IBW):   ( ) UBW (%UBW):   (  %)    BMI: Body mass index is 40.27 kg/(m^2). This BMI is indicative of:  []Underweight   []Normal   []Overweight   [] Obesity   [x] Extreme Obesity (BMI>40)  Last Weight Metrics:  Weight Loss Metrics 9/11/2018 8/29/2018 7/19/2018 6/17/2018 5/30/2018 4/24/2018 4/2/2018   Today's Wt 272 lb 11.3 oz 286 lb 8 oz 313 lb 8 oz 343 lb 343 lb 333 lb 335 lb   BMI 40.27 kg/m2 42.31 kg/m2 46.3 kg/m2 50.65 kg/m2 50.65 kg/m2 49.18 kg/m2 49.47 kg/m2       Estimated Nutrition Needs (Based on): 2530 Kcals/day (MSJL: 2180 x 1.2) , 105 g (0.8 g/kg) Protein  Carbohydrate:  At Least 130 g/day  Fluids: 2615 mL/day or per primary team    NUTRITION DIAGNOSES:   Problem:  Less than optimal enteral nutrition      Etiology: related to pt refusing TF     Signs/Symptoms: as evidenced by minimal TF intake since admission    Previous Nutrition Dx:  [] Resolved  [] Unresolved           [x] Progressing    NUTRITION INTERVENTIONS:  Meals/Snacks: General/healthful diet Enteral/Parenteral Nutrition: Initiate enteral nutrition Supplements: Commercial supplement              GOAL:   tolerate goal volume without discontinuing in 2-4 days    NUTRITION MONITORING AND EVALUATION      Food/Nutrient Intake Outcomes: Total energy intake, Enteral/parenteral nutrition intake  Physical Signs/Symptoms Outcomes: Weight/weight change, Electrolyte and renal profile, GI, Glucose profile    Previous Goal Met:   [] Met              [x] Progressing Towards Goal              [] Not Progressing Towards Goal   Previous Recommendations:   [x] Implemented          [] Not Implemented          [] Not Applicable    LEARNING NEEDS (Diet, Food/Nutrient-Drug Interaction):    [x] None Identified   [] Identified and Education Provided/Documented   [] Identified and Pt declined/was not appropriate     Cultural, Yazidi, OR Ethnic Dietary Needs:    [x] None Identified   [] Identified and Addressed     [x] Interdisciplinary Care Plan Reviewed/Documented    [x] Discharge Planning: Bolus 240 ml of TwoCal q 4 hrs until goal volume of 1200 ml per day is reached (5 boluses total).  60 ml water flush before and after bolus  Provides: 2400 kcal, 100 g pro and 1440 ml free water   [] Participated in Interdisciplinary Rounds    NUTRITION RISK:    [x] High       to       [x] Moderate           []  Low  []  Minimal/Uncompromised      Doy Baumgarten, RDN  Pager [phone]  Weekend Pager [phone]

## 2018-09-20 NOTE — PROGRESS NOTES
Oncology Nursing Communication Tool  4:40 AM  9/20/2018     Bedside shift change report given to Renato Sun RN (incoming nurse) by Karl Palencia (outgoing nurse) on [facility]. Report included the following information SBAR, Kardex and MAR. Shift Summary: Patient is still awaiting a bed a [facility]. He received PRN pain medication throughout the night. He refused his tube feedings at 2300 and requested it be disconnected for the night. Agreed to restart in the AM.      Issues for physician to address: None         Oncology Shift Note   Admission Date 9/10/2018   Admission Diagnosis nausea, vomiting, dehydration  Intractable nausea and vomiting   Code Status Full Code   Consults IP CONSULT TO RADIATION ONCOLOGY  IP CONSULT TO PALLIATIVE CARE - PROVIDER      Cardiac Monitoring [] Yes [x] No      Purposeful Hourly Rounding [x] Yes    Jason Score Total Score: 2   Jason score 3 or > [] Bed Alarm [] Avasys [] 1:1 sitter [] Patient refused (Place signed refusal form in chart)      Pain Managed [] Yes [x] No    Key Pain Meds             oxyCODONE-acetaminophen (PERCOCET) 5-325 mg per tablet  (Taking) Take 1 Tab by mouth every four (4) hours as needed for Pain. Max Daily Amount: 6 Tabs. Influenza Vaccine Received Flu Vaccine for Current Season (usually Sept-March): No    Patient/Guardian Refused (Notify MD): No      Oxygen needs?  [x] Room air Oxygen @  []1L    []2L    []3L   []4L    []5L   []6L     Use home O2? [] Yes [x] No  Perform O2 challenge test using  smartphrase (.oxygenchallenge)      Last bowel movement Last Bowel Movement Date: 09/16/18  bowel movement      Urinary Catheter             LDAs             Venous Access Device 09/18/18 Upper chest (subclavicular area, right (Active)   Central Line Being Utilized Yes 9/20/2018  3:31 AM   Criteria for Appropriate Use Limited/no vessel suitable for conventional peripheral access 9/20/2018  3:31 AM   Site Assessment Clean, dry, & intact 9/20/2018 3:31 AM   Date of Last Dressing Change 09/18/18 9/19/2018  7:54 PM   Dressing Status Clean, dry, & intact 9/20/2018  3:31 AM   Dressing Type Transparent;Tape 9/20/2018  3:31 AM            PEG/Gastrostomy Tube 09/10/18 (Active)   Site Assessment Clean, dry, & intact 9/20/2018  3:30 AM   Dressing Status Clean, dry, & intact 9/20/2018  3:30 AM   G Port Status Clamped 9/20/2018  3:30 AM   Action Taken Feed set changed 9/17/2018  6:14 PM   Gastric Residual (mL) 0 ml 9/19/2018  3:00 AM   Tube Feeding/Formula Options Twocal HN 9/19/2018  7:54 PM   Tube Feeding/Verify Rate (mL/hr) 14 9/19/2018  7:54 PM   Water Flush Volume (mL) 120 mL 9/19/2018  7:54 PM   Intake (ml) 120 ml 9/18/2018 10:35 PM                   Readmission Risk Assessment Tool Score Low Risk            6       Total Score        3 Has Seen PCP in Last 6 Months (Yes=3, No=0)    3 Patient Length of Stay (>5 days = 3)        Criteria that do not apply:    . Living with Significant Other. Assisted Living. LTAC. SNF. or   Rehab    IP Visits Last 12 Months (1-3=4, 4=9, >4=11)    Pt.  Coverage (Medicare=5 , Medicaid, or Self-Pay=4)    Charlson Comorbidity Score (Age + Comorbid Conditions)       Expected Length of Stay 2d 7h   Actual Length of Stay [place]

## 2018-09-21 LAB
ALBUMIN SERPL-MCNC: 2.6 G/DL (ref 3.5–5)
ALBUMIN/GLOB SERPL: 0.7 {RATIO} (ref 1.1–2.2)
ALP SERPL-CCNC: 48 U/L (ref 45–117)
ALT SERPL-CCNC: 20 U/L (ref 12–78)
ANION GAP SERPL CALC-SCNC: 5 MMOL/L (ref 5–15)
AST SERPL-CCNC: 11 U/L (ref 15–37)
BASOPHILS # BLD: 0 K/UL (ref 0–0.1)
BASOPHILS NFR BLD: 0 % (ref 0–1)
BILIRUB SERPL-MCNC: 0.3 MG/DL (ref 0.2–1)
BLASTS NFR BLD MANUAL: 0 %
BUN SERPL-MCNC: 9 MG/DL (ref 6–20)
BUN/CREAT SERPL: 6 (ref 12–20)
CALCIUM SERPL-MCNC: 7.2 MG/DL (ref 8.5–10.1)
CHLORIDE SERPL-SCNC: 100 MMOL/L (ref 97–108)
CO2 SERPL-SCNC: 31 MMOL/L (ref 21–32)
CREAT SERPL-MCNC: 1.43 MG/DL (ref 0.7–1.3)
DIFFERENTIAL METHOD BLD: ABNORMAL
EOSINOPHIL # BLD: 0 K/UL (ref 0–0.4)
EOSINOPHIL NFR BLD: 1 % (ref 0–7)
ERYTHROCYTE [DISTWIDTH] IN BLOOD BY AUTOMATED COUNT: 13.2 % (ref 11.5–14.5)
GLOBULIN SER CALC-MCNC: 3.9 G/DL (ref 2–4)
GLUCOSE SERPL-MCNC: 98 MG/DL (ref 65–100)
HCT VFR BLD AUTO: 31.6 % (ref 36.6–50.3)
HGB BLD-MCNC: 10.7 G/DL (ref 12.1–17)
IMM GRANULOCYTES # BLD: 0 K/UL
IMM GRANULOCYTES NFR BLD AUTO: 0 %
LYMPHOCYTES # BLD: 0.4 K/UL (ref 0.8–3.5)
LYMPHOCYTES NFR BLD: 34 % (ref 12–49)
MAGNESIUM SERPL-MCNC: 1.6 MG/DL (ref 1.6–2.4)
MCH RBC QN AUTO: 28.4 PG (ref 26–34)
MCHC RBC AUTO-ENTMCNC: 33.9 G/DL (ref 30–36.5)
MCV RBC AUTO: 83.8 FL (ref 80–99)
METAMYELOCYTES NFR BLD MANUAL: 0 %
MONOCYTES # BLD: 0.1 K/UL (ref 0–1)
MONOCYTES NFR BLD: 8 % (ref 5–13)
MYELOCYTES NFR BLD MANUAL: 0 %
NEUTS BAND NFR BLD MANUAL: 1 % (ref 0–6)
NEUTS SEG # BLD: 0.8 K/UL (ref 1.8–8)
NEUTS SEG NFR BLD: 56 % (ref 32–75)
NRBC # BLD: 0 K/UL (ref 0–0.01)
NRBC BLD-RTO: 0 PER 100 WBC
OTHER CELLS NFR BLD MANUAL: 0 %
PLATELET # BLD AUTO: 146 K/UL (ref 150–400)
PMV BLD AUTO: 8.8 FL (ref 8.9–12.9)
POTASSIUM SERPL-SCNC: 3.5 MMOL/L (ref 3.5–5.1)
PROMYELOCYTES NFR BLD MANUAL: 0 %
PROT SERPL-MCNC: 6.5 G/DL (ref 6.4–8.2)
RBC # BLD AUTO: 3.77 M/UL (ref 4.1–5.7)
RBC MORPH BLD: ABNORMAL
SODIUM SERPL-SCNC: 136 MMOL/L (ref 136–145)
WBC # BLD AUTO: 1.3 K/UL (ref 4.1–11.1)

## 2018-09-21 PROCEDURE — 65270000015 HC RM PRIVATE ONCOLOGY

## 2018-09-21 PROCEDURE — 74011250636 HC RX REV CODE- 250/636: Performed by: NURSE PRACTITIONER

## 2018-09-21 PROCEDURE — 77386 HC IMRT TRMT DLVR COMPL: CPT

## 2018-09-21 PROCEDURE — 85027 COMPLETE CBC AUTOMATED: CPT | Performed by: INTERNAL MEDICINE

## 2018-09-21 PROCEDURE — 74011250636 HC RX REV CODE- 250/636: Performed by: INTERNAL MEDICINE

## 2018-09-21 PROCEDURE — 92526 ORAL FUNCTION THERAPY: CPT | Performed by: SPEECH-LANGUAGE PATHOLOGIST

## 2018-09-21 PROCEDURE — 36415 COLL VENOUS BLD VENIPUNCTURE: CPT | Performed by: INTERNAL MEDICINE

## 2018-09-21 PROCEDURE — 80053 COMPREHEN METABOLIC PANEL: CPT | Performed by: INTERNAL MEDICINE

## 2018-09-21 PROCEDURE — 74011250637 HC RX REV CODE- 250/637: Performed by: INTERNAL MEDICINE

## 2018-09-21 PROCEDURE — 74011250637 HC RX REV CODE- 250/637: Performed by: NURSE PRACTITIONER

## 2018-09-21 PROCEDURE — 83735 ASSAY OF MAGNESIUM: CPT | Performed by: INTERNAL MEDICINE

## 2018-09-21 PROCEDURE — C9113 INJ PANTOPRAZOLE SODIUM, VIA: HCPCS | Performed by: INTERNAL MEDICINE

## 2018-09-21 RX ADMIN — NYSTATIN 500000 UNITS: 100000 SUSPENSION ORAL at 12:16

## 2018-09-21 RX ADMIN — ONDANSETRON 8 MG: 2 INJECTION INTRAMUSCULAR; INTRAVENOUS at 07:16

## 2018-09-21 RX ADMIN — LIDOCAINE HYDROCHLORIDE 5 ML: 20 SOLUTION ORAL; TOPICAL at 08:24

## 2018-09-21 RX ADMIN — NYSTATIN 500000 UNITS: 100000 SUSPENSION ORAL at 17:58

## 2018-09-21 RX ADMIN — SODIUM CHLORIDE 75 ML/HR: 900 INJECTION, SOLUTION INTRAVENOUS at 18:01

## 2018-09-21 RX ADMIN — LIDOCAINE HYDROCHLORIDE 5 ML: 20 SOLUTION ORAL; TOPICAL at 17:59

## 2018-09-21 RX ADMIN — OXYCODONE HYDROCHLORIDE 20 MG: 100 SOLUTION ORAL at 03:36

## 2018-09-21 RX ADMIN — OXYCODONE HYDROCHLORIDE 20 MG: 100 SOLUTION ORAL at 08:15

## 2018-09-21 RX ADMIN — OXYCODONE HYDROCHLORIDE 20 MG: 100 SOLUTION ORAL at 12:17

## 2018-09-21 RX ADMIN — GABAPENTIN 300 MG: 250 SUSPENSION ORAL at 08:24

## 2018-09-21 RX ADMIN — SODIUM CHLORIDE 125 ML/HR: 900 INJECTION, SOLUTION INTRAVENOUS at 02:30

## 2018-09-21 RX ADMIN — PANTOPRAZOLE SODIUM 40 MG: 40 INJECTION, POWDER, FOR SOLUTION INTRAVENOUS at 17:58

## 2018-09-21 RX ADMIN — OXYCODONE HYDROCHLORIDE 20 MG: 100 SOLUTION ORAL at 22:29

## 2018-09-21 RX ADMIN — OXYCODONE HYDROCHLORIDE 20 MG: 100 SOLUTION ORAL at 16:33

## 2018-09-21 RX ADMIN — NYSTATIN 500000 UNITS: 100000 SUSPENSION ORAL at 08:14

## 2018-09-21 RX ADMIN — NYSTATIN 500000 UNITS: 100000 SUSPENSION ORAL at 22:29

## 2018-09-21 RX ADMIN — GABAPENTIN 300 MG: 250 SUSPENSION ORAL at 16:33

## 2018-09-21 RX ADMIN — LIDOCAINE HYDROCHLORIDE 5 ML: 20 SOLUTION ORAL; TOPICAL at 12:16

## 2018-09-21 RX ADMIN — MORPHINE SULFATE 3 MG: 10 INJECTION INTRAVENOUS at 20:00

## 2018-09-21 NOTE — PROGRESS NOTES
Spiritual Care Assessment/Progress Note  [facility]      NAME: Leora Arias      MRN: [id number]  AGE: 43 y.o.  SEX: male  Evangelical Affiliation: Restoration   Language: English     9/21/2018     Total Time (in minutes): 10     Spiritual Assessment begun in [facility] ONCOL[facility]Y through conversation with:         [x]Patient        [] Family    [] Friend(s)        Reason for Consult: Initial/Spiritual assessment, patient floor     Spiritual beliefs: (Please include comment if needed)     [x] Identifies with a kelly tradition:         [] Supported by a kelly community:            [] Claims no spiritual orientation:           [] Seeking spiritual identity:                [] Adheres to an individual form of spirituality:           [] Not able to assess:                           Identified resources for coping:      [x] Prayer                               [] Music                  [] Guided Imagery     [x] Family/friends                 [] Pet visits     [] Devotional reading                         [] Unknown     [] Other:  Restoration                                           Interventions offered during this visit: (See comments for more details)    Patient Interventions: Affirmation of emotions/emotional suffering, Coping skills reviewed/reinforced, Iconic (affirming the presence of God/Higher Power)           Plan of Care:     [x] Support spiritual and/or cultural needs    [] Support AMD and/or advance care planning process      [] Support grieving process   [] Coordinate Rites and/or Rituals    [] Coordination with community clergy   [] No spiritual needs identified at this time   [] Detailed Plan of Care below (See Comments)  [] Make referral to Music Therapy  [] Make referral to Pet Therapy     [] Make referral to Addiction services  [] Make referral to [facility]  [] Make referral to Spiritual Care Partner  [] No future visits requested        [] Follow up visits as needed Comments:     Patient sitting up in bed watching television, lights low. Good eye contact, friendly. Spoke briefly about present thoughts, feelings, and concerns as related to his current health and hospitalization. Patient keeps most conversation concerning his cancer and treatment at a superficial level. Much of it refers to how he has decided not to claim his cancer. Finds strength and comfort in family and friends. Appears to have good family support. Appeared encouraged as a result of this visit and expressed gratitude for this visit. Patient requested a Bible and a Bible was given to him. Visited by Rev. Jd Chase, [address] [place] [address] paging service: [phone])

## 2018-09-21 NOTE — PROGRESS NOTES
Came to see patient. He is walking down to shower and then will have XRT in about 30 minutes. Will attempt follow up later today. Patient has been followed by SLP throughout this stay, but pain and nausea have limited sessions. Will continue to attempt sessions and at least teach him swallow exercises even if he is unable to demo himself 2/2 above    Note possible plan to discharge to home, will need follow up SLP tx as HH or OP if this occurs.        Apolonia Emmanuel M.S., [address] [facility]  Speech-Language Pathologist

## 2018-09-21 NOTE — PROGRESS NOTES
Hematology Oncology Progress Note       Follow up for: head and neck cancer (left tonsil)        Patient complains of the following:     S: Mouth pain still severe but a little improved overall, not taking anything by mouth      Patient Vitals for the past 24 hrs:   BP Temp Pulse Resp SpO2   09/21/18 0735 132/85 98.4 °F (36.9 °C) 72 18 98 %   09/21/18 0020 121/78 98.2 °F (36.8 °C) 76 18 100 %   09/20/18 1949 129/76 97.6 °F (36.4 °C) 83 18 97 %   09/20/18 1457 122/84 98 °F (36.7 °C) 85 18 99 %   09/20/18 1134 124/80 97.8 °F (36.6 °C) 80 18 97 %   09/20/18 0936 152/84 97.8 °F (36.6 °C) (!) 105 18 97 %       Review of Systems:negative except as above    Gen NAD  OP mucositis  CV reg  Lungs clear   abd benign  Ext no edema      Labs:  Recent Results (from the past 24 hour(s))   MAGNESIUM    Collection Time: 09/21/18  3:41 AM   Result Value Ref Range    Magnesium 1.6 1.6 - 2.4 mg/dL       Assessment and Plan:    left tonsillar carcinoma     S/p chemo /RT  S/p C2 of cisplatin  Currently on hold  Getting XRT  [place] [facility] [place], now we are hopefully getting closer to discharge and transferring him would do patient no good  Severe mucositis     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 PEG tube feedings as tolerated. Symptomatic meds - magic mouthwash, viscous lidocaine, morphine as tolerated. pain control - Cont with fentanyl patch, Morphine, magic mouthwash, viscous lidocaine.   Appreciate palliative care's help in trying to transition him to liquid oxycodone so he can take through PEG  Nausea - on zofran

## 2018-09-21 NOTE — PROGRESS NOTES
Problem: Dysphagia (Adult)  Goal: *Speech Goal: (INSERT TEXT)  1. Patient will tolerate puree diet with thin liquids without signs/symptoms of aspiration given no cues within 7 day(s). NOT MET    uPDATE 9/21/2018  1. Patient will tolerate CLEAR LIQUID diet with thin liquids without signs/symptoms of aspiration AT LEAST ONCE PER DAY  given no cues within 7 day(s). Speech language pathology dysphagia treatment: WEEKLY REASSESSMENT  Patient: Eliseo Gonzalez (43 y.o. male)  Date: 9/21/2018  Diagnosis: nausea, vomiting, dehydration  Intractable nausea and vomiting Intractable nausea and vomiting       Precautions:  Fall    ASSESSMENT:  Patient has been deferred several times this week as he is unable to participate in PO trials 2/2 his nausea. Today, brought examples of exercises with handout, which he may have difficulty performing now 2/2 pain but that I want him to perform in the future. patient has given a handout on these though can only perform tongue movement. Suspect mucositis will limit his participation at present but I continue to emphasized for swallow follow up with him. Patient's progression toward goals since last assessment: Patient as been limited by nasuea and pain and has not had SLP tx this week despite multiple deferred visits. SLP continuing to follow. PLAN:  Goals have been updated based on progression since last assessment. Patient continues to benefit from skilled intervention to address the above impairments. Continue to follow the patient 2 times a week to address goals. Recommendations and Planned Interventions:  1. Swallow exercises as tolerated  2. PO trials to work toward re-establishing PO. We can also help with strategies once he is ready. Discharge Recommendations: Outpatient     SUBJECTIVE:   Patient stated Eula Freddie.     OBJECTIVE:   Cognitive and Communication Status:  Neurologic State: Alert  Orientation Level: Oriented X4  Cognition: Appropriate decision making  Perception: Appears intact  Perseveration: No perseveration noted  Safety/Judgement: Awareness of environment  Dysphagia Treatment:  Oral Assessment:  Oral Assessment  Labial: No impairment  Dentition: Full; Intact  Lingual: No impairment  Mandible:  (decreased jaw opening)  P.O. Trials:  Patient Position: upright in bed  Vocal quality prior to P.O.:    Consistency Presented:  (PO deferred 2/2 pain. )                           Exercises:  Laryngeal Exercises:                    Effortful Swallow:  (performed x 1 by patient with p! noted)                                      Evi:  (patient instructed on this and demo)                 Shaker:  (patient instructed on exercise, performed x 1)                          Tongue Back & Hold:  (patient in structed on tongue exercises and demo'ed each x 1)                          Pain:  Pain Scale 1: Numeric (0 - 10)  Pain Intensity 1: 9  Pain Location 1: Throat  After treatment:   []                Patient left in no apparent distress sitting up in chair  [x]                Patient left in no apparent distress in bed  [x]                Call bell left within reach  []                Nursing notified  [x]                Caregiver present  []                Bed alarm activated    COMMUNICATION/EDUCATION:   Patient was educated regarding His deficit(s) of dysphagia  as this relates to His diagnosis of CA. He demonstrated Fair understanding as evidenced by nodding in agreement with all info, but patient has limited buy-in to the information I am giving him about long term swallow tx because he is understandably focused on his immediate symptoms. .    The patients plan of care including recommendations, planned interventions, and recommended diet changes were discussed with: Registered Nurse. []                Posted safety precautions in patient's room.     Delphina Boxer, SLP  Time Calculation: 10 mins

## 2018-09-21 NOTE — PROGRESS NOTES
Oncology Nursing Communication Tool  7:19 PM  9/21/2018     Bedside shift change report given to [place], RN (incoming nurse) by Mili Campbell (outgoing nurse) on [employer]. Report included the following information SBAR, Kardex, Intake/Output, MAR and Recent Results. Shift Summary:       Issues for physician to address: Oncology Shift Note   Admission Date 9/10/2018   Admission Diagnosis nausea, vomiting, dehydration  Intractable nausea and vomiting   Code Status Full Code   Consults IP CONSULT TO RADIATION ONCOLOGY  IP CONSULT TO PALLIATIVE CARE - PROVIDER      Cardiac Monitoring [] Yes [] No      Purposeful Hourly Rounding [] Yes    Jason Score Total Score: 2   Jason score 3 or > [] Bed Alarm [] Avasys [] 1:1 sitter [] Patient refused (Place signed refusal form in chart)      Pain Managed [] Yes [] No    Key Pain Meds             oxyCODONE-acetaminophen (PERCOCET) 5-325 mg per tablet  (Taking) Take 1 Tab by mouth every four (4) hours as needed for Pain. Max Daily Amount: 6 Tabs. Influenza Vaccine Received Flu Vaccine for Current Season (usually Sept-March): Yes    Patient/Guardian Refused (Notify MD): No      Oxygen needs? [] Room air Oxygen @  []1L    []2L    []3L   []4L    []5L   []6L     Use home O2? [] Yes [] No  Perform O2 challenge test using  smartphrase (.oxygenchallenge)      Last bowel movement Last Bowel Movement Date: 09/20/18  bowel movement      Urinary Catheter             LDAs             Venous Access Device 09/18/18 Upper chest (subclavicular area, right (Active)   Central Line Being Utilized Yes 9/21/2018  8:00 AM   Criteria for Appropriate Use Irritant/vesicant medication 9/21/2018  8:00 AM   Site Assessment Clean, dry, & intact 9/21/2018  8:00 AM   Date of Last Dressing Change 09/18/18 9/21/2018  3:19 AM   Dressing Status Clean, dry, & intact 9/21/2018  8:00 AM   Dressing Type Disk with Chlorhexadine gluconate (CHG); Transparent 9/21/2018 8:00 AM   Date Accessed (Medial Site) 09/18/18 9/21/2018  3:19 AM   Positive Blood Return (Medial Site) Yes 9/21/2018  8:00 AM   Alcohol Cap Used Yes 9/21/2018  3:19 AM            PEG/Gastrostomy Tube 09/10/18 (Active)   Site Assessment Clean, dry, & intact 9/21/2018  8:00 AM   Dressing Status Clean, dry, & intact 9/21/2018  8:00 AM   G Port Status Infusing 9/21/2018  8:00 AM   Action Taken Feed set changed; Tubing changed 9/21/2018  5:12 AM   Gastric Residual (mL) 0 ml 9/21/2018  5:12 AM   Tube Feeding/Formula Options Twocal HN 9/21/2018  8:00 AM   Tube Feeding/Verify Rate (mL/hr) 40 9/21/2018  8:00 AM   Water Flush Volume (mL) 125 mL 9/21/2018  8:00 AM   Intake (ml) 120 ml 9/18/2018 10:35 PM                   Readmission Risk Assessment Tool Score Low Risk            6       Total Score        3 Has Seen PCP in Last 6 Months (Yes=3, No=0)    3 Patient Length of Stay (>5 days = 3)        Criteria that do not apply:    . Living with Significant Other. Assisted Living. LTAC. SNF. or   Rehab    IP Visits Last 12 Months (1-3=4, 4=9, >4=11)    Pt.  Coverage (Medicare=5 , Medicaid, or Self-Pay=4)    Charlson Comorbidity Score (Age + Comorbid Conditions)       Expected Length of Stay 2d 7h   Actual Length of Stay [address]

## 2018-09-22 LAB
ALBUMIN SERPL-MCNC: 2.8 G/DL (ref 3.5–5)
ALBUMIN/GLOB SERPL: 0.7 {RATIO} (ref 1.1–2.2)
ALP SERPL-CCNC: 50 U/L (ref 45–117)
ALT SERPL-CCNC: 20 U/L (ref 12–78)
ANION GAP SERPL CALC-SCNC: 5 MMOL/L (ref 5–15)
AST SERPL-CCNC: 13 U/L (ref 15–37)
BASOPHILS # BLD: 0 K/UL (ref 0–0.1)
BASOPHILS NFR BLD: 0 % (ref 0–1)
BILIRUB SERPL-MCNC: 0.2 MG/DL (ref 0.2–1)
BLASTS NFR BLD MANUAL: 0 %
BUN SERPL-MCNC: 9 MG/DL (ref 6–20)
BUN/CREAT SERPL: 6 (ref 12–20)
CALCIUM SERPL-MCNC: 7.4 MG/DL (ref 8.5–10.1)
CHLORIDE SERPL-SCNC: 100 MMOL/L (ref 97–108)
CO2 SERPL-SCNC: 32 MMOL/L (ref 21–32)
CREAT SERPL-MCNC: 1.52 MG/DL (ref 0.7–1.3)
DIFFERENTIAL METHOD BLD: ABNORMAL
EOSINOPHIL # BLD: 0 K/UL (ref 0–0.4)
EOSINOPHIL NFR BLD: 2 % (ref 0–7)
ERYTHROCYTE [DISTWIDTH] IN BLOOD BY AUTOMATED COUNT: 13.2 % (ref 11.5–14.5)
GLOBULIN SER CALC-MCNC: 3.8 G/DL (ref 2–4)
GLUCOSE SERPL-MCNC: 109 MG/DL (ref 65–100)
HCT VFR BLD AUTO: 25 % (ref 36.6–50.3)
HGB BLD-MCNC: 8.5 G/DL (ref 12.1–17)
IMM GRANULOCYTES # BLD: 0 K/UL
IMM GRANULOCYTES NFR BLD AUTO: 0 %
LYMPHOCYTES # BLD: 0.3 K/UL (ref 0.8–3.5)
LYMPHOCYTES NFR BLD: 20 % (ref 12–49)
MAGNESIUM SERPL-MCNC: 1.3 MG/DL (ref 1.6–2.4)
MCH RBC QN AUTO: 28.6 PG (ref 26–34)
MCHC RBC AUTO-ENTMCNC: 34 G/DL (ref 30–36.5)
MCV RBC AUTO: 84.2 FL (ref 80–99)
METAMYELOCYTES NFR BLD MANUAL: 0 %
MONOCYTES # BLD: 0.2 K/UL (ref 0–1)
MONOCYTES NFR BLD: 14 % (ref 5–13)
MYELOCYTES NFR BLD MANUAL: 0 %
NEUTS BAND NFR BLD MANUAL: 0 % (ref 0–6)
NEUTS SEG # BLD: 1.1 K/UL (ref 1.8–8)
NEUTS SEG NFR BLD: 64 % (ref 32–75)
NRBC # BLD: 0 K/UL (ref 0–0.01)
NRBC BLD-RTO: 0 PER 100 WBC
OTHER CELLS NFR BLD MANUAL: 0 %
PLATELET # BLD AUTO: 165 K/UL (ref 150–400)
PMV BLD AUTO: 8.4 FL (ref 8.9–12.9)
POTASSIUM SERPL-SCNC: 3.5 MMOL/L (ref 3.5–5.1)
PROMYELOCYTES NFR BLD MANUAL: 0 %
PROT SERPL-MCNC: 6.6 G/DL (ref 6.4–8.2)
RBC # BLD AUTO: 2.97 M/UL (ref 4.1–5.7)
RBC MORPH BLD: ABNORMAL
SODIUM SERPL-SCNC: 137 MMOL/L (ref 136–145)
WBC # BLD AUTO: 1.6 K/UL (ref 4.1–11.1)

## 2018-09-22 PROCEDURE — 74011250636 HC RX REV CODE- 250/636: Performed by: NURSE PRACTITIONER

## 2018-09-22 PROCEDURE — 83735 ASSAY OF MAGNESIUM: CPT | Performed by: INTERNAL MEDICINE

## 2018-09-22 PROCEDURE — 85027 COMPLETE CBC AUTOMATED: CPT | Performed by: INTERNAL MEDICINE

## 2018-09-22 PROCEDURE — 65270000015 HC RM PRIVATE ONCOLOGY

## 2018-09-22 PROCEDURE — 74011250636 HC RX REV CODE- 250/636: Performed by: INTERNAL MEDICINE

## 2018-09-22 PROCEDURE — 80053 COMPREHEN METABOLIC PANEL: CPT | Performed by: INTERNAL MEDICINE

## 2018-09-22 PROCEDURE — 74011250637 HC RX REV CODE- 250/637: Performed by: INTERNAL MEDICINE

## 2018-09-22 PROCEDURE — 74011250637 HC RX REV CODE- 250/637: Performed by: NURSE PRACTITIONER

## 2018-09-22 PROCEDURE — 36415 COLL VENOUS BLD VENIPUNCTURE: CPT | Performed by: INTERNAL MEDICINE

## 2018-09-22 PROCEDURE — 74011000250 HC RX REV CODE- 250: Performed by: INTERNAL MEDICINE

## 2018-09-22 PROCEDURE — C9113 INJ PANTOPRAZOLE SODIUM, VIA: HCPCS | Performed by: INTERNAL MEDICINE

## 2018-09-22 RX ORDER — SODIUM CHLORIDE 0.9 % (FLUSH) 0.9 %
SYRINGE (ML) INJECTION
Status: COMPLETED
Start: 2018-09-22 | End: 2018-09-22

## 2018-09-22 RX ADMIN — LIDOCAINE HYDROCHLORIDE 5 ML: 20 SOLUTION ORAL; TOPICAL at 13:04

## 2018-09-22 RX ADMIN — PROCHLORPERAZINE EDISYLATE 10 MG: 5 INJECTION INTRAMUSCULAR; INTRAVENOUS at 21:51

## 2018-09-22 RX ADMIN — LORAZEPAM 1 MG: 2 INJECTION INTRAMUSCULAR; INTRAVENOUS at 20:00

## 2018-09-22 RX ADMIN — GABAPENTIN 300 MG: 250 SUSPENSION ORAL at 07:34

## 2018-09-22 RX ADMIN — NYSTATIN 500000 UNITS: 100000 SUSPENSION ORAL at 13:04

## 2018-09-22 RX ADMIN — LIDOCAINE HYDROCHLORIDE 15 ML: 20 SOLUTION ORAL; TOPICAL at 13:06

## 2018-09-22 RX ADMIN — OXYCODONE HYDROCHLORIDE 20 MG: 100 SOLUTION ORAL at 07:34

## 2018-09-22 RX ADMIN — MORPHINE SULFATE 3 MG: 10 INJECTION INTRAVENOUS at 20:00

## 2018-09-22 RX ADMIN — NYSTATIN 500000 UNITS: 100000 SUSPENSION ORAL at 07:33

## 2018-09-22 RX ADMIN — LIDOCAINE HYDROCHLORIDE 15 ML: 20 SOLUTION ORAL; TOPICAL at 15:55

## 2018-09-22 RX ADMIN — OXYCODONE HYDROCHLORIDE 20 MG: 100 SOLUTION ORAL at 13:04

## 2018-09-22 RX ADMIN — SODIUM CHLORIDE 75 ML/HR: 900 INJECTION, SOLUTION INTRAVENOUS at 21:51

## 2018-09-22 RX ADMIN — OXYCODONE HYDROCHLORIDE 20 MG: 100 SOLUTION ORAL at 21:51

## 2018-09-22 RX ADMIN — LIDOCAINE HYDROCHLORIDE 15 ML: 20 SOLUTION ORAL; TOPICAL at 00:24

## 2018-09-22 RX ADMIN — MORPHINE SULFATE 3 MG: 10 INJECTION INTRAVENOUS at 10:58

## 2018-09-22 RX ADMIN — SODIUM CHLORIDE 75 ML/HR: 900 INJECTION, SOLUTION INTRAVENOUS at 07:32

## 2018-09-22 RX ADMIN — NYSTATIN 500000 UNITS: 100000 SUSPENSION ORAL at 17:49

## 2018-09-22 RX ADMIN — PANTOPRAZOLE SODIUM 40 MG: 40 INJECTION, POWDER, FOR SOLUTION INTRAVENOUS at 17:48

## 2018-09-22 RX ADMIN — LIDOCAINE HYDROCHLORIDE 5 ML: 20 SOLUTION ORAL; TOPICAL at 17:50

## 2018-09-22 RX ADMIN — LIDOCAINE HYDROCHLORIDE 15 ML: 20 SOLUTION ORAL; TOPICAL at 17:49

## 2018-09-22 RX ADMIN — OXYCODONE HYDROCHLORIDE 20 MG: 100 SOLUTION ORAL at 03:25

## 2018-09-22 RX ADMIN — GABAPENTIN 300 MG: 250 SUSPENSION ORAL at 00:25

## 2018-09-22 RX ADMIN — NYSTATIN 500000 UNITS: 100000 SUSPENSION ORAL at 21:52

## 2018-09-22 RX ADMIN — LIDOCAINE HYDROCHLORIDE 5 ML: 20 SOLUTION ORAL; TOPICAL at 07:33

## 2018-09-22 RX ADMIN — Medication 20 ML: at 20:06

## 2018-09-22 RX ADMIN — GABAPENTIN 300 MG: 250 SUSPENSION ORAL at 16:00

## 2018-09-22 RX ADMIN — ONDANSETRON 8 MG: 2 INJECTION INTRAMUSCULAR; INTRAVENOUS at 03:25

## 2018-09-22 RX ADMIN — OXYCODONE HYDROCHLORIDE 20 MG: 100 SOLUTION ORAL at 17:48

## 2018-09-22 RX ADMIN — LIDOCAINE HYDROCHLORIDE 5 ML: 20 SOLUTION ORAL; TOPICAL at 21:53

## 2018-09-22 NOTE — PROGRESS NOTES
Hematology Oncology Progress Note       Follow up for: head and neck cancer (left tonsil)        Patient complains of the following:     S: sleeping comfortably      Patient Vitals for the past 24 hrs:   BP Temp Pulse Resp SpO2   09/22/18 0830 118/54 98.4 °F (36.9 °C) 82 16 94 %   09/21/18 2249 132/57 98.4 °F (36.9 °C) 72 18 99 %   09/21/18 1942 (!) 131/94 97.8 °F (36.6 °C) 81 18 97 %   09/21/18 1617 145/88 98.6 °F (37 °C) 72 18 100 %       Review of Systems:negative except as above    Gen NAD  OP mucositis  CV reg  Lungs clear   abd benign  Ext no edema      Labs:  Recent Results (from the past 24 hour(s))   CBC WITH MANUAL DIFF    Collection Time: 09/22/18  3:32 AM   Result Value Ref Range    WBC 1.6 (L) 4.1 - 11.1 K/uL    RBC 2.97 (L) 4.10 - 5.70 M/uL    HGB 8.5 (L) 12.1 - 17.0 g/dL    HCT 25.0 (L) 36.6 - 50.3 %    MCV 84.2 80.0 - 99.0 FL    MCH 28.6 26.0 - 34.0 PG    MCHC 34.0 30.0 - 36.5 g/dL    RDW 13.2 11.5 - 14.5 %    PLATELET 018 412 - 222 K/uL    MPV 8.4 (L) 8.9 - 12.9 FL    NRBC 0.0 0  WBC    ABSOLUTE NRBC 0.00 0.00 - 0.01 K/uL    NEUTROPHILS 64 32 - 75 %    BAND NEUTROPHILS 0 0 - 6 %    LYMPHOCYTES 20 12 - 49 %    MONOCYTES 14 (H) 5 - 13 %    EOSINOPHILS 2 0 - 7 %    BASOPHILS 0 0 - 1 %    METAMYELOCYTES 0 0 %    MYELOCYTES 0 0 %    PROMYELOCYTES 0 0 %    BLASTS 0 0 %    OTHER CELL 0 0      IMMATURE GRANULOCYTES 0 %    ABS. NEUTROPHILS 1.1 (L) 1.8 - 8.0 K/UL    ABS. LYMPHOCYTES 0.3 (L) 0.8 - 3.5 K/UL    ABS. MONOCYTES 0.2 0.0 - 1.0 K/UL    ABS. EOSINOPHILS 0.0 0.0 - 0.4 K/UL    ABS. BASOPHILS 0.0 0.0 - 0.1 K/UL    ABS. IMM.  GRANS. 0.0 K/UL    DF MANUAL      RBC COMMENTS NORMOCYTIC, NORMOCHROMIC     METABOLIC PANEL, COMPREHENSIVE    Collection Time: 09/22/18  3:32 AM   Result Value Ref Range    Sodium 137 136 - 145 mmol/L    Potassium 3.5 3.5 - 5.1 mmol/L    Chloride 100 97 - 108 mmol/L    CO2 32 21 - 32 mmol/L    Anion gap 5 5 - 15 mmol/L    Glucose 109 (H) 65 - 100 mg/dL    BUN 9 6 - 20 MG/DL Creatinine 1.52 (H) 0.70 - 1.30 MG/DL    BUN/Creatinine ratio 6 (L) 12 - 20      GFR est AA >60 >60 ml/min/1.73m2    GFR est non-AA 51 (L) >60 ml/min/1.73m2    Calcium 7.4 (L) 8.5 - 10.1 MG/DL    Bilirubin, total 0.2 0.2 - 1.0 MG/DL    ALT (SGPT) 20 12 - 78 U/L    AST (SGOT) 13 (L) 15 - 37 U/L    Alk. phosphatase 50 45 - 117 U/L    Protein, total 6.6 6.4 - 8.2 g/dL    Albumin 2.8 (L) 3.5 - 5.0 g/dL    Globulin 3.8 2.0 - 4.0 g/dL    A-G Ratio 0.7 (L) 1.1 - 2.2     MAGNESIUM    Collection Time: 09/22/18  3:32 AM   Result Value Ref Range    Magnesium 1.3 (L) 1.6 - 2.4 mg/dL       Assessment and Plan:    left tonsillar carcinoma     S/p chemo /RT  S/p C2 of cisplatin  Currently on hold  Getting XRT  Have been trying to transfer patient to [facility] because of insurance, now we are hopefully getting closer to discharge and transferring him would do patient no good  Severe mucositis     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 PEG tube feedings as tolerated. Symptomatic meds - magic mouthwash, viscous lidocaine, morphine as tolerated. pain control - Cont with fentanyl patch, Morphine, magic mouthwash, viscous lidocaine.   Appreciate palliative care's help in trying to transition him to liquid oxycodone so he can take through PEG    Nausea - on zofran

## 2018-09-22 NOTE — PROGRESS NOTES
Oncology Nursing Communication Tool  7:18 PM  9/22/2018     Bedside shift change report given to Ira Glover RN (incoming nurse) by Charmayne Fare (outgoing nurse) on [address] [facility]. Report included the following information SBAR, Kardex, Intake/Output, MAR and Recent Results. Shift Summary:       Issues for physician to address: Oncology Shift Note   Admission Date 9/10/2018   Admission Diagnosis nausea, vomiting, dehydration  Intractable nausea and vomiting   Code Status Full Code   Consults IP CONSULT TO RADIATION ONCOLOGY  IP CONSULT TO PALLIATIVE CARE - PROVIDER      Cardiac Monitoring [] Yes [] No      Purposeful Hourly Rounding [] Yes    Jason Score Total Score: 2   Jason score 3 or > [] Bed Alarm [] Avasys [] 1:1 sitter [] Patient refused (Place signed refusal form in chart)      Pain Managed [] Yes [] No    Key Pain Meds             oxyCODONE-acetaminophen (PERCOCET) 5-325 mg per tablet  (Taking) Take 1 Tab by mouth every four (4) hours as needed for Pain. Max Daily Amount: 6 Tabs. Influenza Vaccine Received Flu Vaccine for Current Season (usually Sept-March): Yes    Patient/Guardian Refused (Notify MD): No      Oxygen needs?  [] Room air Oxygen @  []1L    []2L    []3L   []4L    []5L   []6L     Use home O2? [] Yes [] No  Perform O2 challenge test using  smartphrase (.oxygenchallenge)      Last bowel movement Last Bowel Movement Date: 09/20/18  bowel movement      Urinary Catheter             LDAs             Venous Access Device 09/18/18 Upper chest (subclavicular area, right (Active)   Central Line Being Utilized Yes 9/22/2018  3:00 PM   Criteria for Appropriate Use Limited/no vessel suitable for conventional peripheral access 9/22/2018  3:00 PM   Site Assessment Clean, dry, & intact 9/22/2018  3:00 PM   Date of Last Dressing Change 09/18/18 9/21/2018  8:03 PM   Dressing Status Clean, dry, & intact 9/22/2018  3:00 PM   Dressing Type Disk with Chlorhexadine gluconate (CHG); Transparent 9/22/2018  3:00 PM   Date Accessed (Medial Site) 09/18/18 9/21/2018  3:19 AM   Positive Blood Return (Medial Site) Yes 9/22/2018  3:00 PM   Alcohol Cap Used Yes 9/21/2018  3:19 AM            PEG/Gastrostomy Tube 09/10/18 (Active)   Site Assessment Clean, dry, & intact 9/22/2018  3:00 PM   Dressing Status Clean, dry, & intact 9/22/2018  3:00 PM   G Port Status Infusing 9/22/2018  3:00 PM   Action Taken Feed set changed; Tubing changed 9/21/2018  5:12 AM   Gastric Residual (mL) 0 ml 9/22/2018  8:00 AM   Tube Feeding/Formula Options Twocal HN 9/22/2018  3:00 PM   Tube Feeding/Verify Rate (mL/hr) 40 9/22/2018  8:00 AM   Water Flush Volume (mL) 150 mL 9/22/2018  8:00 AM   Intake (ml) 120 ml 9/18/2018 10:35 PM                   Readmission Risk Assessment Tool Score Low Risk            6       Total Score        3 Has Seen PCP in Last 6 Months (Yes=3, No=0)    3 Patient Length of Stay (>5 days = 3)        Criteria that do not apply:    . Living with Significant Other. Assisted Living. LTAC. SNF. or   Rehab    IP Visits Last 12 Months (1-3=4, 4=9, >4=11)    Pt.  Coverage (Medicare=5 , Medicaid, or Self-Pay=4)    Charlson Comorbidity Score (Age + Comorbid Conditions)       Expected Length of Stay 2d 7h   Actual Length of Stay [place] 22

## 2018-09-22 NOTE — PROGRESS NOTES
Oncology Nursing Communication Tool 6:43 AM 
9/22/2018 Bedside and Verbal shift change report given to Isreal Luna RN (incoming nurse) by Kae Waller CNA (outgoing nurse) on [address] [facility]. Report included the following information SBAR, Kardex and MAR. Shift Summary: pt rested through out the night, received pain meds for throat pain Issues for physician to address: Oncology Shift Note Admission Date 9/10/2018 Admission Diagnosis nausea, vomiting, dehydration Intractable nausea and vomiting Code Status Full Code Consults IP CONSULT TO RADIATION ONCOLOGY 
IP CONSULT TO PALLIATIVE CARE - PROVIDER Cardiac Monitoring [] Yes [x] No  
  
Purposeful Hourly Rounding [x] Yes   
Jason Score Total Score: 2 Jason score 3 or > [] Bed Alarm [] Avasys [] 1:1 sitter [] Patient refused (Place signed refusal form in chart) Pain Managed [x] Yes [] No  
 Key Pain Meds   
    
  
 oxyCODONE-acetaminophen (PERCOCET) 5-325 mg per tablet  (Taking) Take 1 Tab by mouth every four (4) hours as needed for Pain. Max Daily Amount: 6 Tabs. Influenza Vaccine Received Flu Vaccine for Current Season (usually Sept-March): Yes Patient/Guardian Refused (Notify MD): No  
  
Oxygen needs? [x] Room air Oxygen @  []1L    []2L    []3L   []4L    []5L   []6L Use home O2? [] Yes [] No 
Perform O2 challenge test using  smartphrase (.oxygenchallenge) Last bowel movement Last Bowel Movement Date: 09/20/18 
bowel movement Urinary Catheter LDAs Venous Access Device 09/18/18 Upper chest (subclavicular area, right (Active) Central Line Being Utilized Yes 9/22/2018  3:35 AM  
Criteria for Appropriate Use Limited/no vessel suitable for conventional peripheral access 9/22/2018  3:35 AM  
Site Assessment Clean, dry, & intact 9/22/2018  3:35 AM  
Date of Last Dressing Change 09/18/18 9/21/2018  8:03 PM  
Dressing Status Clean, dry, & intact 9/22/2018  3:35 AM  
 Dressing Type Disk with Chlorhexadine gluconate (CHG) 9/22/2018  3:35 AM  
Date Accessed (Medial Site) 09/18/18 9/21/2018  3:19 AM  
Positive Blood Return (Medial Site) Yes 9/22/2018  3:35 AM  
Alcohol Cap Used Yes 9/21/2018  3:19 AM  
        
PEG/Gastrostomy Tube 09/10/18 (Active) Site Assessment Clean, dry, & intact 9/22/2018  3:35 AM  
Dressing Status Clean, dry, & intact 9/22/2018  3:35 AM  
G Port Status Infusing 9/22/2018  3:35 AM  
Action Taken Feed set changed; Tubing changed 9/21/2018  5:12 AM  
Gastric Residual (mL) 0 ml 9/21/2018  5:12 AM  
Tube Feeding/Formula Options Twocal HN 9/22/2018  3:35 AM  
Tube Feeding/Verify Rate (mL/hr) 40 9/22/2018  3:35 AM  
Water Flush Volume (mL) 125 mL 9/21/2018  8:03 PM  
Intake (ml) 120 ml 9/18/2018 10:35 PM  
            
  
Readmission Risk Assessment Tool Score Low Risk 6 Total Score 3 Has Seen PCP in Last 6 Months (Yes=3, No=0) 3 Patient Length of Stay (>5 days = 3) Criteria that do not apply:  
 . Living with Significant Other. Assisted Living. LTAC. SNF. or  
Rehab  
 IP Visits Last 12 Months (1-3=4, 4=9, >4=11) Pt. Coverage (Medicare=5 , Medicaid, or Self-Pay=4) Charlson Comorbidity Score (Age + Comorbid Conditions) Expected Length of Stay 2d 7h Actual Length of Stay 12 Meghan Vicente CNA

## 2018-09-23 ENCOUNTER — APPOINTMENT (OUTPATIENT)
Dept: GENERAL RADIOLOGY | Age: 43
DRG: 683 | End: 2018-09-23
Attending: INTERNAL MEDICINE
Payer: COMMERCIAL

## 2018-09-23 LAB
ALBUMIN SERPL-MCNC: 3 G/DL (ref 3.5–5)
ALBUMIN/GLOB SERPL: 0.6 {RATIO} (ref 1.1–2.2)
ALP SERPL-CCNC: 53 U/L (ref 45–117)
ALT SERPL-CCNC: 20 U/L (ref 12–78)
ANION GAP SERPL CALC-SCNC: 7 MMOL/L (ref 5–15)
AST SERPL-CCNC: 10 U/L (ref 15–37)
BASOPHILS # BLD: 0 K/UL (ref 0–0.1)
BASOPHILS NFR BLD: 0 % (ref 0–1)
BILIRUB SERPL-MCNC: 0.3 MG/DL (ref 0.2–1)
BLASTS NFR BLD MANUAL: 0 %
BUN SERPL-MCNC: 10 MG/DL (ref 6–20)
BUN/CREAT SERPL: 7 (ref 12–20)
CALCIUM SERPL-MCNC: 7.6 MG/DL (ref 8.5–10.1)
CHLORIDE SERPL-SCNC: 97 MMOL/L (ref 97–108)
CO2 SERPL-SCNC: 30 MMOL/L (ref 21–32)
CREAT SERPL-MCNC: 1.38 MG/DL (ref 0.7–1.3)
DIFFERENTIAL METHOD BLD: ABNORMAL
EOSINOPHIL # BLD: 0 K/UL (ref 0–0.4)
EOSINOPHIL NFR BLD: 0 % (ref 0–7)
ERYTHROCYTE [DISTWIDTH] IN BLOOD BY AUTOMATED COUNT: 13.4 % (ref 11.5–14.5)
GLOBULIN SER CALC-MCNC: 4.7 G/DL (ref 2–4)
GLUCOSE SERPL-MCNC: 128 MG/DL (ref 65–100)
HCT VFR BLD AUTO: 27.9 % (ref 36.6–50.3)
HGB BLD-MCNC: 9.4 G/DL (ref 12.1–17)
IMM GRANULOCYTES # BLD: 0 K/UL
IMM GRANULOCYTES NFR BLD AUTO: 0 %
LYMPHOCYTES # BLD: 0.3 K/UL (ref 0.8–3.5)
LYMPHOCYTES NFR BLD: 20 % (ref 12–49)
MAGNESIUM SERPL-MCNC: 1.3 MG/DL (ref 1.6–2.4)
MCH RBC QN AUTO: 28.6 PG (ref 26–34)
MCHC RBC AUTO-ENTMCNC: 33.7 G/DL (ref 30–36.5)
MCV RBC AUTO: 84.8 FL (ref 80–99)
METAMYELOCYTES NFR BLD MANUAL: 0 %
MONOCYTES # BLD: 0.2 K/UL (ref 0–1)
MONOCYTES NFR BLD: 14 % (ref 5–13)
MYELOCYTES NFR BLD MANUAL: 0 %
NEUTS BAND NFR BLD MANUAL: 7 % (ref 0–6)
NEUTS SEG # BLD: 0.9 K/UL (ref 1.8–8)
NEUTS SEG NFR BLD: 59 % (ref 32–75)
NRBC # BLD: 0 K/UL (ref 0–0.01)
NRBC BLD-RTO: 0 PER 100 WBC
OTHER CELLS NFR BLD MANUAL: 0 %
PLATELET # BLD AUTO: 234 K/UL (ref 150–400)
PMV BLD AUTO: 8.8 FL (ref 8.9–12.9)
POTASSIUM SERPL-SCNC: 3.4 MMOL/L (ref 3.5–5.1)
PROMYELOCYTES NFR BLD MANUAL: 0 %
PROT SERPL-MCNC: 7.7 G/DL (ref 6.4–8.2)
RBC # BLD AUTO: 3.29 M/UL (ref 4.1–5.7)
RBC MORPH BLD: ABNORMAL
SODIUM SERPL-SCNC: 134 MMOL/L (ref 136–145)
WBC # BLD AUTO: 1.4 K/UL (ref 4.1–11.1)

## 2018-09-23 PROCEDURE — 65270000015 HC RM PRIVATE ONCOLOGY

## 2018-09-23 PROCEDURE — 71045 X-RAY EXAM CHEST 1 VIEW: CPT

## 2018-09-23 PROCEDURE — 83735 ASSAY OF MAGNESIUM: CPT | Performed by: INTERNAL MEDICINE

## 2018-09-23 PROCEDURE — 36415 COLL VENOUS BLD VENIPUNCTURE: CPT | Performed by: INTERNAL MEDICINE

## 2018-09-23 PROCEDURE — 74011250637 HC RX REV CODE- 250/637: Performed by: INTERNAL MEDICINE

## 2018-09-23 PROCEDURE — C9113 INJ PANTOPRAZOLE SODIUM, VIA: HCPCS | Performed by: INTERNAL MEDICINE

## 2018-09-23 PROCEDURE — 74011000250 HC RX REV CODE- 250: Performed by: INTERNAL MEDICINE

## 2018-09-23 PROCEDURE — 74011250636 HC RX REV CODE- 250/636: Performed by: INTERNAL MEDICINE

## 2018-09-23 PROCEDURE — 80053 COMPREHEN METABOLIC PANEL: CPT | Performed by: INTERNAL MEDICINE

## 2018-09-23 PROCEDURE — 74011250637 HC RX REV CODE- 250/637: Performed by: NURSE PRACTITIONER

## 2018-09-23 PROCEDURE — 74011250636 HC RX REV CODE- 250/636: Performed by: NURSE PRACTITIONER

## 2018-09-23 PROCEDURE — 85027 COMPLETE CBC AUTOMATED: CPT | Performed by: INTERNAL MEDICINE

## 2018-09-23 RX ADMIN — MORPHINE SULFATE 3 MG: 10 INJECTION INTRAVENOUS at 00:36

## 2018-09-23 RX ADMIN — PROCHLORPERAZINE EDISYLATE 10 MG: 5 INJECTION INTRAMUSCULAR; INTRAVENOUS at 17:51

## 2018-09-23 RX ADMIN — NYSTATIN 500000 UNITS: 100000 SUSPENSION ORAL at 08:56

## 2018-09-23 RX ADMIN — LIDOCAINE HYDROCHLORIDE 15 ML: 20 SOLUTION ORAL; TOPICAL at 12:55

## 2018-09-23 RX ADMIN — LORAZEPAM 1 MG: 2 INJECTION INTRAMUSCULAR; INTRAVENOUS at 00:36

## 2018-09-23 RX ADMIN — MORPHINE SULFATE 3 MG: 10 INJECTION INTRAVENOUS at 20:59

## 2018-09-23 RX ADMIN — LIDOCAINE HYDROCHLORIDE 5 ML: 20 SOLUTION ORAL; TOPICAL at 17:52

## 2018-09-23 RX ADMIN — POLYETHYLENE GLYCOL 3350 17 G: 17 POWDER, FOR SOLUTION ORAL at 08:56

## 2018-09-23 RX ADMIN — GABAPENTIN 300 MG: 250 SUSPENSION ORAL at 10:39

## 2018-09-23 RX ADMIN — OXYCODONE HYDROCHLORIDE 20 MG: 100 SOLUTION ORAL at 12:55

## 2018-09-23 RX ADMIN — PANTOPRAZOLE SODIUM 40 MG: 40 INJECTION, POWDER, FOR SOLUTION INTRAVENOUS at 17:51

## 2018-09-23 RX ADMIN — MORPHINE SULFATE 3 MG: 10 INJECTION INTRAVENOUS at 16:15

## 2018-09-23 RX ADMIN — NYSTATIN 500000 UNITS: 100000 SUSPENSION ORAL at 23:57

## 2018-09-23 RX ADMIN — ONDANSETRON 8 MG: 2 INJECTION INTRAMUSCULAR; INTRAVENOUS at 01:40

## 2018-09-23 RX ADMIN — LIDOCAINE HYDROCHLORIDE 5 ML: 20 SOLUTION ORAL; TOPICAL at 15:45

## 2018-09-23 RX ADMIN — GABAPENTIN 300 MG: 250 SUSPENSION ORAL at 20:51

## 2018-09-23 RX ADMIN — NYSTATIN 500000 UNITS: 100000 SUSPENSION ORAL at 12:55

## 2018-09-23 RX ADMIN — NYSTATIN 500000 UNITS: 100000 SUSPENSION ORAL at 17:51

## 2018-09-23 RX ADMIN — LIDOCAINE HYDROCHLORIDE 5 ML: 20 SOLUTION ORAL; TOPICAL at 08:56

## 2018-09-23 RX ADMIN — MORPHINE SULFATE 3 MG: 10 INJECTION INTRAVENOUS at 06:11

## 2018-09-23 RX ADMIN — OXYCODONE HYDROCHLORIDE 20 MG: 100 SOLUTION ORAL at 01:37

## 2018-09-23 RX ADMIN — ACETAMINOPHEN 650 MG: 325 SOLUTION ORAL at 01:37

## 2018-09-23 RX ADMIN — OXYCODONE HYDROCHLORIDE 20 MG: 100 SOLUTION ORAL at 23:58

## 2018-09-23 RX ADMIN — ACETAMINOPHEN 650 MG: 325 SOLUTION ORAL at 20:51

## 2018-09-23 RX ADMIN — LIDOCAINE HYDROCHLORIDE 5 ML: 20 SOLUTION ORAL; TOPICAL at 23:58

## 2018-09-23 RX ADMIN — ONDANSETRON 8 MG: 2 INJECTION INTRAMUSCULAR; INTRAVENOUS at 15:45

## 2018-09-23 RX ADMIN — OXYCODONE HYDROCHLORIDE 20 MG: 100 SOLUTION ORAL at 08:56

## 2018-09-23 RX ADMIN — GABAPENTIN 300 MG: 250 SUSPENSION ORAL at 00:35

## 2018-09-23 NOTE — ROUTINE PROCESS
Oncology Nursing Communication Tool  8:44 AM  9/23/2018     Bedside shift change report given to Amadeo Pineda RN (incoming nurse) by Catalina Olson RN (outgoing nurse) on [address] [facility] [employer] [facility]. Report included the following information SBAR, Kardex, Intake/Output, MAR and Recent Results. Shift Summary: pt very anxious at beginning of my shift. C/o of neck/throat pain & verbalized that he felt like his throat was more swollen & very concerned. Pt med for pain & anxiety. sxing himself for mod amt of thick, oral secretions. Pt c/o of some nausea & tube feed stopped about 0130 per his request.      Issues for physician to address: increased neck swelling, pain & anxiety. Low grade fever overnight & slightly tachy. Oncology Shift Note   Admission Date 9/10/2018   Admission Diagnosis nausea, vomiting, dehydration  Intractable nausea and vomiting   Code Status Full Code   Consults IP CONSULT TO RADIATION ONCOLOGY  IP CONSULT TO PALLIATIVE CARE - PROVIDER      Cardiac Monitoring [] Yes [x] No      Purposeful Hourly Rounding [x] Yes    Jason Score Total Score: 2   Jason score 3 or > [] Bed Alarm [] Avasys [] 1:1 sitter [] Patient refused (Place signed refusal form in chart)      Pain Managed [x] Yes [] No    Key Pain Meds             oxyCODONE-acetaminophen (PERCOCET) 5-325 mg per tablet  (Taking) Take 1 Tab by mouth every four (4) hours as needed for Pain. Max Daily Amount: 6 Tabs. Influenza Vaccine Received Flu Vaccine for Current Season (usually Sept-March): Yes    Patient/Guardian Refused (Notify MD): No      Oxygen needs?  [x] Room air Oxygen @  []1L    []2L    []3L   []4L    []5L   []6L     Use home O2? [] Yes [x] No  Perform O2 challenge test using  smartphrase (.oxygenchallenge)      Last bowel movement Last Bowel Movement Date: 09/20/18  bowel movement      Urinary Catheter             LDAs             Venous Access Device 09/18/18 Upper chest (subclavicular area, right (Active)   [facility] Being Utilized Yes 9/23/2018  1:37 AM   Criteria for Appropriate Use Limited/no vessel suitable for conventional peripheral access 9/23/2018  1:37 AM   Site Assessment Clean, dry, & intact 9/23/2018  1:37 AM   Date of Last Dressing Change 09/18/18 9/23/2018  1:37 AM   Dressing Status Clean, dry, & intact 9/23/2018  1:37 AM   Dressing Type Disk with Chlorhexadine gluconate (CHG); Transparent 9/23/2018  1:37 AM   Date Accessed (Medial Site) 09/18/18 9/21/2018  3:19 AM   Positive Blood Return (Medial Site) Yes 9/23/2018  1:37 AM   Action Taken (Medial Site) Flushed; Infusing 9/23/2018  1:37 AM   Alcohol Cap Used Yes 9/21/2018  3:19 AM            PEG/Gastrostomy Tube 09/10/18 (Active)   Site Assessment Clean, dry, & intact 9/22/2018  8:16 PM   Dressing Status Clean, dry, & intact 9/22/2018  8:16 PM   G Port Status Infusing 9/22/2018  8:16 PM   Action Taken Feed set changed; Tubing changed 9/21/2018  5:12 AM   Gastric Residual (mL) 0 ml 9/22/2018  8:16 PM   Tube Feeding/Formula Options Twocal HN 9/22/2018  8:16 PM   Tube Feeding/Verify Rate (mL/hr) 40 9/22/2018  8:16 PM   Water Flush Volume (mL) 150 mL 9/22/2018  8:16 PM   Intake (ml) 230 ml 9/22/2018 10:00 PM   Medication Volume 50 ml 9/22/2018 10:00 PM                   Readmission Risk Assessment Tool Score Low Risk            6       Total Score        3 Has Seen PCP in Last 6 Months (Yes=3, No=0)    3 Patient Length of Stay (>5 days = 3)        Criteria that do not apply:    . Living with Significant Other. Assisted Living. LTAC. SNF. or   Rehab    IP Visits Last 12 Months (1-3=4, 4=9, >4=11)    Pt.  Coverage (Medicare=5 , Medicaid, or Self-Pay=4)    Charlson Comorbidity Score (Age + Comorbid Conditions)       Expected Length of Stay 2d 7h   Actual Length of Stay [address] Yonathan Winchester RN

## 2018-09-23 NOTE — PROGRESS NOTES
Hematology Oncology Progress Note       Follow up for: head and neck cancer (left tonsil)        Patient complains of the following:     S: still with pain from mucositis. Lots of upper airway secretions      Patient Vitals for the past 24 hrs:   BP Temp Pulse Resp SpO2   09/23/18 0727 (!) 140/91 99 °F (37.2 °C) (!) 129 20 95 %   09/23/18 0344 139/70 97.9 °F (36.6 °C) (!) 118 22 96 %   09/23/18 0032 (!) 136/102 99.5 °F (37.5 °C) 100 20 98 %   09/22/18 2016 146/88 99.1 °F (37.3 °C) 89 20 98 %   09/22/18 1700 118/63 99.8 °F (37.7 °C) 88 16 98 %       Review of Systems:negative except as above    Gen NAD  OP mucositis  CV reg  Lungs clear   abd benign  Ext no edema      Labs:  Recent Results (from the past 24 hour(s))   CBC WITH MANUAL DIFF    Collection Time: 09/23/18  3:54 AM   Result Value Ref Range    WBC 1.4 (L) 4.1 - 11.1 K/uL    RBC 3.29 (L) 4.10 - 5.70 M/uL    HGB 9.4 (L) 12.1 - 17.0 g/dL    HCT 27.9 (L) 36.6 - 50.3 %    MCV 84.8 80.0 - 99.0 FL    MCH 28.6 26.0 - 34.0 PG    MCHC 33.7 30.0 - 36.5 g/dL    RDW 13.4 11.5 - 14.5 %    PLATELET 317 882 - 044 K/uL    MPV 8.8 (L) 8.9 - 12.9 FL    NRBC 0.0 0  WBC    ABSOLUTE NRBC 0.00 0.00 - 0.01 K/uL    NEUTROPHILS 59 32 - 75 %    BAND NEUTROPHILS 7 (H) 0 - 6 %    LYMPHOCYTES 20 12 - 49 %    MONOCYTES 14 (H) 5 - 13 %    EOSINOPHILS 0 0 - 7 %    BASOPHILS 0 0 - 1 %    METAMYELOCYTES 0 0 %    MYELOCYTES 0 0 %    PROMYELOCYTES 0 0 %    BLASTS 0 0 %    OTHER CELL 0 0      IMMATURE GRANULOCYTES 0 %    ABS. NEUTROPHILS 0.9 (L) 1.8 - 8.0 K/UL    ABS. LYMPHOCYTES 0.3 (L) 0.8 - 3.5 K/UL    ABS. MONOCYTES 0.2 0.0 - 1.0 K/UL    ABS. EOSINOPHILS 0.0 0.0 - 0.4 K/UL    ABS. BASOPHILS 0.0 0.0 - 0.1 K/UL    ABS. IMM.  GRANS. 0.0 K/UL    DF MANUAL      RBC COMMENTS NORMOCYTIC, NORMOCHROMIC     METABOLIC PANEL, COMPREHENSIVE    Collection Time: 09/23/18  3:54 AM   Result Value Ref Range    Sodium 134 (L) 136 - 145 mmol/L    Potassium 3.4 (L) 3.5 - 5.1 mmol/L    Chloride 97 97 - 108 mmol/L    CO2 30 21 - 32 mmol/L    Anion gap 7 5 - 15 mmol/L    Glucose 128 (H) 65 - 100 mg/dL    BUN 10 6 - 20 MG/DL    Creatinine 1.38 (H) 0.70 - 1.30 MG/DL    BUN/Creatinine ratio 7 (L) 12 - 20      GFR est AA >60 >60 ml/min/1.73m2    GFR est non-AA 57 (L) >60 ml/min/1.73m2    Calcium 7.6 (L) 8.5 - 10.1 MG/DL    Bilirubin, total 0.3 0.2 - 1.0 MG/DL    ALT (SGPT) 20 12 - 78 U/L    AST (SGOT) 10 (L) 15 - 37 U/L    Alk. phosphatase 53 45 - 117 U/L    Protein, total 7.7 6.4 - 8.2 g/dL    Albumin 3.0 (L) 3.5 - 5.0 g/dL    Globulin 4.7 (H) 2.0 - 4.0 g/dL    A-G Ratio 0.6 (L) 1.1 - 2.2     MAGNESIUM    Collection Time: 09/23/18  3:54 AM   Result Value Ref Range    Magnesium 1.3 (L) 1.6 - 2.4 mg/dL       Assessment and Plan:    left tonsillar carcinoma     S/p chemo /RT  S/p C2 of cisplatin  Currently on hold  Getting XRT  [place] [facility] [place], now we are hopefully getting closer to discharge and transferring him would do patient no good  Severe mucositis     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 PEG tube feedings as tolerated. Symptomatic meds - magic mouthwash, viscous lidocaine, morphine as tolerated. pain control - Cont with fentanyl patch, Morphine, magic mouthwash, viscous lidocaine.   Appreciate palliative care's help in trying to transition him to liquid oxycodone so he can take through PEG    Nausea - on zofran    Obtain CXR to r/o pneumonia

## 2018-09-24 LAB — MAGNESIUM SERPL-MCNC: 1.3 MG/DL (ref 1.6–2.4)

## 2018-09-24 PROCEDURE — 74011250637 HC RX REV CODE- 250/637: Performed by: NURSE PRACTITIONER

## 2018-09-24 PROCEDURE — 83735 ASSAY OF MAGNESIUM: CPT | Performed by: INTERNAL MEDICINE

## 2018-09-24 PROCEDURE — 92526 ORAL FUNCTION THERAPY: CPT

## 2018-09-24 PROCEDURE — 74011250636 HC RX REV CODE- 250/636: Performed by: NURSE PRACTITIONER

## 2018-09-24 PROCEDURE — 74011250636 HC RX REV CODE- 250/636: Performed by: INTERNAL MEDICINE

## 2018-09-24 PROCEDURE — 36415 COLL VENOUS BLD VENIPUNCTURE: CPT | Performed by: INTERNAL MEDICINE

## 2018-09-24 PROCEDURE — 74011250637 HC RX REV CODE- 250/637: Performed by: INTERNAL MEDICINE

## 2018-09-24 PROCEDURE — 77336 RADIATION PHYSICS CONSULT: CPT

## 2018-09-24 PROCEDURE — 65270000015 HC RM PRIVATE ONCOLOGY

## 2018-09-24 PROCEDURE — 74011000250 HC RX REV CODE- 250: Performed by: INTERNAL MEDICINE

## 2018-09-24 PROCEDURE — C9113 INJ PANTOPRAZOLE SODIUM, VIA: HCPCS | Performed by: INTERNAL MEDICINE

## 2018-09-24 RX ORDER — POLYETHYLENE GLYCOL 3350 17 G/17G
17 POWDER, FOR SOLUTION ORAL DAILY
Qty: 30 PACKET | Refills: 1 | Status: SHIPPED | OUTPATIENT
Start: 2018-09-25 | End: 2020-02-25 | Stop reason: SDUPTHER

## 2018-09-24 RX ORDER — NYSTATIN 100000 [USP'U]/ML
500000 SUSPENSION ORAL 4 TIMES DAILY
Qty: 100 ML | Refills: 0 | Status: SHIPPED | OUTPATIENT
Start: 2018-09-24

## 2018-09-24 RX ORDER — POTASSIUM CHLORIDE 7.45 MG/ML
10 INJECTION INTRAVENOUS
Status: COMPLETED | OUTPATIENT
Start: 2018-09-24 | End: 2018-09-25

## 2018-09-24 RX ORDER — MAGNESIUM SULFATE HEPTAHYDRATE 40 MG/ML
4 INJECTION, SOLUTION INTRAVENOUS ONCE
Status: COMPLETED | OUTPATIENT
Start: 2018-09-24 | End: 2018-09-24

## 2018-09-24 RX ORDER — MAGNESIUM SULFATE 4 G/50ML
4 INJECTION INTRAVENOUS ONCE
Status: DISCONTINUED | OUTPATIENT
Start: 2018-09-24 | End: 2018-09-24 | Stop reason: RX

## 2018-09-24 RX ORDER — FENTANYL 100 UG/H
1 PATCH TRANSDERMAL
Qty: 5 PATCH | Refills: 0 | Status: SHIPPED | OUTPATIENT
Start: 2018-09-25 | End: 2022-08-18

## 2018-09-24 RX ORDER — POTASSIUM CHLORIDE 7.45 MG/ML
10 INJECTION INTRAVENOUS
Status: DISPENSED | OUTPATIENT
Start: 2018-09-24 | End: 2018-09-24

## 2018-09-24 RX ORDER — OXYCODONE HCL 20 MG/ML
20 CONCENTRATE, ORAL ORAL
Qty: 30 ML | Refills: 0 | Status: SHIPPED | OUTPATIENT
Start: 2018-09-24 | End: 2022-05-12

## 2018-09-24 RX ORDER — LIDOCAINE HYDROCHLORIDE 20 MG/ML
15 SOLUTION OROPHARYNGEAL AS NEEDED
Qty: 1 BOTTLE | Refills: 1 | Status: SHIPPED | OUTPATIENT
Start: 2018-09-24

## 2018-09-24 RX ADMIN — POTASSIUM CHLORIDE 10 MEQ: 10 INJECTION, SOLUTION INTRAVENOUS at 19:12

## 2018-09-24 RX ADMIN — OXYCODONE HYDROCHLORIDE 20 MG: 100 SOLUTION ORAL at 15:25

## 2018-09-24 RX ADMIN — NYSTATIN 500000 UNITS: 100000 SUSPENSION ORAL at 12:46

## 2018-09-24 RX ADMIN — LIDOCAINE HYDROCHLORIDE 5 ML: 20 SOLUTION ORAL; TOPICAL at 16:33

## 2018-09-24 RX ADMIN — LIDOCAINE HYDROCHLORIDE 15 ML: 20 SOLUTION ORAL; TOPICAL at 09:04

## 2018-09-24 RX ADMIN — GABAPENTIN 300 MG: 250 SUSPENSION ORAL at 12:43

## 2018-09-24 RX ADMIN — LIDOCAINE HYDROCHLORIDE 5 ML: 20 SOLUTION ORAL; TOPICAL at 18:07

## 2018-09-24 RX ADMIN — MORPHINE SULFATE 3 MG: 10 INJECTION INTRAVENOUS at 22:00

## 2018-09-24 RX ADMIN — LIDOCAINE HYDROCHLORIDE 15 ML: 20 SOLUTION ORAL; TOPICAL at 20:14

## 2018-09-24 RX ADMIN — LORAZEPAM 1 MG: 2 INJECTION INTRAMUSCULAR; INTRAVENOUS at 04:39

## 2018-09-24 RX ADMIN — POTASSIUM CHLORIDE 10 MEQ: 10 INJECTION, SOLUTION INTRAVENOUS at 12:43

## 2018-09-24 RX ADMIN — POTASSIUM CHLORIDE 10 MEQ: 10 INJECTION, SOLUTION INTRAVENOUS at 18:06

## 2018-09-24 RX ADMIN — ACETAMINOPHEN 650 MG: 325 SOLUTION ORAL at 02:40

## 2018-09-24 RX ADMIN — ONDANSETRON 8 MG: 2 INJECTION INTRAMUSCULAR; INTRAVENOUS at 08:51

## 2018-09-24 RX ADMIN — GABAPENTIN 300 MG: 250 SUSPENSION ORAL at 23:00

## 2018-09-24 RX ADMIN — LIDOCAINE HYDROCHLORIDE 15 ML: 20 SOLUTION ORAL; TOPICAL at 02:39

## 2018-09-24 RX ADMIN — OXYCODONE HYDROCHLORIDE 20 MG: 100 SOLUTION ORAL at 04:28

## 2018-09-24 RX ADMIN — OXYCODONE HYDROCHLORIDE 20 MG: 100 SOLUTION ORAL at 08:51

## 2018-09-24 RX ADMIN — NYSTATIN 500000 UNITS: 100000 SUSPENSION ORAL at 18:06

## 2018-09-24 RX ADMIN — PANTOPRAZOLE SODIUM 40 MG: 40 INJECTION, POWDER, FOR SOLUTION INTRAVENOUS at 18:06

## 2018-09-24 RX ADMIN — GABAPENTIN 300 MG: 250 SUSPENSION ORAL at 06:09

## 2018-09-24 RX ADMIN — MORPHINE SULFATE 3 MG: 10 INJECTION INTRAVENOUS at 12:40

## 2018-09-24 RX ADMIN — MORPHINE SULFATE 3 MG: 10 INJECTION INTRAVENOUS at 18:06

## 2018-09-24 RX ADMIN — OXYCODONE HYDROCHLORIDE 20 MG: 100 SOLUTION ORAL at 20:10

## 2018-09-24 RX ADMIN — NYSTATIN 500000 UNITS: 100000 SUSPENSION ORAL at 23:00

## 2018-09-24 RX ADMIN — MAGNESIUM SULFATE IN WATER 4 G: 40 INJECTION, SOLUTION INTRAVENOUS at 14:04

## 2018-09-24 RX ADMIN — MORPHINE SULFATE 3 MG: 10 INJECTION INTRAVENOUS at 01:15

## 2018-09-24 RX ADMIN — POTASSIUM CHLORIDE 10 MEQ: 10 INJECTION, SOLUTION INTRAVENOUS at 15:25

## 2018-09-24 RX ADMIN — LIDOCAINE HYDROCHLORIDE 5 ML: 20 SOLUTION ORAL; TOPICAL at 08:52

## 2018-09-24 RX ADMIN — ONDANSETRON 8 MG: 2 INJECTION INTRAMUSCULAR; INTRAVENOUS at 18:06

## 2018-09-24 RX ADMIN — SODIUM CHLORIDE 75 ML/HR: 900 INJECTION, SOLUTION INTRAVENOUS at 00:27

## 2018-09-24 NOTE — PROGRESS NOTES
Speech path  Pt continues to complain of 9/10 throat pain when swallowing. He accepted one ice chip but coughed and cleared secretions afterwards. He is aware of swallowing exercises and has written exercises. We will sign off. He is not participating due to pain.    Carmelita Bolanos, SLP

## 2018-09-24 NOTE — PROGRESS NOTES
Hematology Oncology Progress Note       Follow up for: head and neck cancer (left tonsil)        Patient complains of the following:     S: still with pain from mucositis. Lots of upper airway secretions. He is concerned he can't go home due to his secretions. AF ON. Patient Vitals for the past 24 hrs:   BP Temp Pulse Resp SpO2   09/24/18 0731 (!) 172/91 97.7 °F (36.5 °C) 100 20 98 %   09/24/18 0423 144/84 99 °F (37.2 °C) 90 20 96 %   09/23/18 2355 - 99.7 °F (37.6 °C) - - -   09/23/18 2048 122/70 100.2 °F (37.9 °C) (!) 112 20 92 %   09/23/18 1552 140/81 98.9 °F (37.2 °C) (!) 119 - -       Review of Systems:negative except as above    Gen NAD  OP mucositis  CV reg  Lungs clear   abd benign  Ext no edema      Labs:  Recent Results (from the past 24 hour(s))   MAGNESIUM    Collection Time: 09/24/18  4:35 AM   Result Value Ref Range    Magnesium 1.3 (L) 1.6 - 2.4 mg/dL       Assessment and Plan:    left tonsillar carcinoma     S/p chemo /RT  S/p C2 of cisplatin  Currently on hold  Getting XRT  Have been trying to transfer patient [place] [facility] because of insurance, now we are hopefully getting closer to discharge and transferring him would do patient no good  Severe mucositis     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 PEG tube feedings as tolerated. Symptomatic meds - magic mouthwash, viscous lidocaine, morphine as tolerated. pain control - Cont with fentanyl patch, Morphine, magic mouthwash, viscous lidocaine. Appreciate palliative care's help in trying to transition him to liquid oxycodone so he can take through PEG    Nausea - on zofran    CXR 9/23 negative    Discussed with pt he is ready for discharge, he is asking to stay bc he wants to use suction for secretions, discussed he has to spit up secretions and we can't get home suction. Pain controlled on fentanyl and liquid short acting. Will not give more Cisplatin, has 4 more days of xrt.   Home health for tube feeds at home.     Hypomagnesemia and hypokalemia:  - 4 grams Mag sulfate and 40meq KCL given today

## 2018-09-24 NOTE — PROGRESS NOTES
Patient has discharge order written for 9/25/2018 - patient states family/girlfriend will be picking him up from the hospital.    Patient reports he has tube feeding supplies at home as prescribed prior to admission and he knows how to use them. Patient reports he was receiving home health from [facility] home care which was ordered by Radiation Oncology - Dr. Elva Amador - and would like to resume care with that agency. Referral sent via [employer] and info placed on AVS.      Care Management Interventions  PCP Verified by CM: Yes (Dr. Shaun Iglesias)  Palliative Care Criteria Met (RRAT>21 & CHF Dx)?: No  Mode of Transport at Discharge:  Other (see comment) (Family)  Transition of Care Consult (CM Consult): Home Health (Resume care with [facility] [address])  [facility] - INPATIENT: No  Reason Outside [place]: Patient already serviced by other home care/hospice agency ([facility] [address])  [address] [place] [place]: No  Discharge Durable Medical Equipment: No  Physical Therapy Consult: No  Occupational Therapy Consult: No  Speech Therapy Consult: No  Current Support Network: Own Home, Family Lives Nearby (Patient's girlfriend lives w/ him - mother also assists with care)  Confirm Follow Up Transport: Family  Plan discussed with Pt/Family/Caregiver: Yes  Freedom of Choice Offered: Yes ([address] on chart)  Discharge Location  Discharge Placement: Transferred to higher level of care ([address] transfer)    Cynthia Kaur RN, BSN, ACM   - Medical Oncology  [phone]

## 2018-09-24 NOTE — DISCHARGE SUMMARY
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 He has a history of squamous cell carcinoma of the left tonsil, p16 positive and is currently undergoing treatment with concurrent chemoradiation. His last dose of cisplatin was on 09/05 which was a second dose. He states that over the weekend on Saturday, he started having vomiting that was reported to be very painful. He does have mucositis as well. He states that he had Compazine at home, but was not able to keep this down. He did not try to take anything by mouth. He does have a PEG tube in place, but states he was scared to use the PEG tube for anything since he was so scared of having emesis. He is now in clinic. We have given him a unit of fluids and he is very anxious and nervous about going home since he is worried that he will start vomiting again. He denies any fevers or chills, or constipation. His pain has been difficult to control throughout this process and continues to do so.     Hospital Course: Kim Stark is a 43 y.o. admitted on 9/10/2018 for nausea, vomiting, dehydration  Intractable nausea and vomiting severe mucositis, RALPH, and severe mucositis. He is undergoing concurrent chemo/xrt and wasn't using peg at home when he was admitted with above problems. He has been on IV hydration and has continued on Xrt and has had pain management with fentanyl patch and liquid oxycodone via PEG tube with prn morphine IV. His Nausea and vomited improved and his RALPH resolved. He continues to have a lot of oral secretions and a cxray was done on  that didn't show any pna. He will continue xrt until  and we will hold future chemotherapy. He has improved and no longer is needing inpatient care. Audra Harding was seen in consultation by Palliative Care and Radiation Oncology    Imagin18 CXRAY:    IMPRESSION:     No acute process on portable chest. Right port and catheter are new since   and in good position. Labs:      Recent Results (from the past 24 hour(s))   MAGNESIUM    Collection Time: 18  4:35 AM   Result Value Ref Range    Magnesium 1.3 (L) 1.6 - 2.4 mg/dL       Discharge Medications:  Current Discharge Medication List      CONTINUE these medications which have NOT CHANGED    Details   oxyCODONE-acetaminophen (PERCOCET) 5-325 mg per tablet Take 1 Tab by mouth every four (4) hours as needed for Pain. Max Daily Amount: 6 Tabs. Qty: 6 Tab, Refills: 0    Associated Diagnoses: Post-op pain; Status post incision and drainage      gabapentin (NEURONTIN) 300 mg capsule Take 300 mg by mouth three (3) times daily. diphenhydrAMINE (BENADRYL) 25 mg capsule Take 25 mg by mouth every six (6) hours as needed. Discharge Exam:  T 100F  P88  /79  RR 18  O2 96%RA  GENERAL:  He is an obese gentleman laying in bed, NAD  HEENT:  He has a mucositis present in the posterior oropharynx, improved. Dry mucous membranes. Pupils are equal, reactive to light. HEMATOLOGIC:  Fullness in the left cervical chain with skin pigment changes. RESPIRATORY:  Lungs are clear to auscultation bilaterally.   No wheezes or rhonchi. CARDIOVASCULAR:  Regular rate and rhythm. No murmurs, rubs or gallops. CHEST:  Right port in place that is well healed. ABDOMEN:  PEG is in place. Abdomen is obese with redundant tissue and is soft. No hepatosplenomegaly appreciated. Exam difficult. BACK:  No tenderness to palpation. MUSCULOSKELETAL:  No tenderness or swelling of the joints. EXTREMITIES:   No clubbing, cyanosis or edema. SKIN:  No rash. NEUROLOGIC:  Cranial nerves II-XII are grossly intact. Disposition: Home    Patient Instructions:      Activity: as tolerated  Diet: TF via peg tube  Home Health consulted to assist with tube feeds    Follow-up with Radiation Oncology and Dr Karlos Asher in 1-2 days    Signed:  Bernardo Alonzo MD  9/24/2018  2:56 PM

## 2018-09-24 NOTE — PROGRESS NOTES
Nutrition Assessment:    INTERVENTIONS/RECOMMENDATIONS:   · Continue current diet  · Continue current TF order, when restarting TF start him @ goal rate of 50 ml/hr. Encourage to limit time off pump. · If pt does not meet >80% of ordered TF by next visit, will need to reassess TF plan. ASSESSMENT:   Chart reviewed. Pt sleeping during visit. Pump history shows pt only received 19% of ordered nutrition in the past 72 hrs (~500 kcal per day). TF being held due to nausea. Flowsheets shows pt still has not met goal rate of 50 ml/hr. Diet Order: Clear liquids (TwoCal @ 50 ml/hr + 150 ml free water q3h)  % Eaten:  No data found. Pertinent Medications: [x]Reviewed: NS, zofran, PPI, miralax, KCl, compazine  Pertinent Labs: [x]Reviewed: K+ 3.4,   Food Allergies: [x]NKFA  []Other   Last BM:  9/23  Edema:      []RUE   []LUE   []RLE   []LLE      Pressure Ulcer:      [] Stage I   [] Stage II   [] Stage III   [] Stage IV      Anthropometrics: Height: 5' 9\" (175.3 cm) Weight: 123.7 kg (272 lb 11.3 oz)    IBW (%IBW):   ( ) UBW (%UBW):   (  %)    BMI: Body mass index is 40.27 kg/(m^2). This BMI is indicative of:  []Underweight   []Normal   []Overweight   [] Obesity   [x] Extreme Obesity (BMI>40)  Last Weight Metrics:  Weight Loss Metrics 9/11/2018 8/29/2018 7/19/2018 6/17/2018 5/30/2018 4/24/2018 4/2/2018   Today's Wt 272 lb 11.3 oz 286 lb 8 oz 313 lb 8 oz 343 lb 343 lb 333 lb 335 lb   BMI 40.27 kg/m2 42.31 kg/m2 46.3 kg/m2 50.65 kg/m2 50.65 kg/m2 49.18 kg/m2 49.47 kg/m2       Estimated Nutrition Needs (Based on): 4799 Kcals/day (MSJL: 2180 x 1.2) , 105 g (0.8 g/kg) Protein  Carbohydrate:  At Least 130 g/day  Fluids: 2615 mL/day or per primary team    NUTRITION DIAGNOSES:   Problem:  Less than optimal enteral nutrition      Etiology: related to pt discountinuing TF     Signs/Symptoms: as evidenced by pt received 19% of ordred TF in the past 72 hrs    Previous Nutrition Dx:  [] Resolved  [x] Unresolved           [] Progressing    NUTRITION INTERVENTIONS:  Meals/Snacks: General/healthful diet Enteral/Parenteral Nutrition: Initiate enteral nutrition Supplements: Commercial supplement              GOAL:   tolerate goal volume without discontinuing in 2-4 days    NUTRITION MONITORING AND EVALUATION      Food/Nutrient Intake Outcomes:  Total energy intake, Enteral/parenteral nutrition intake  Physical Signs/Symptoms Outcomes: Weight/weight change, Electrolyte and renal profile, GI, Glucose profile    Previous Goal Met:   [] Met              [] Progressing Towards Goal              [x] Not Progressing Towards Goal   Previous Recommendations:   [x] Implemented          [] Not Implemented          [] Not Applicable    LEARNING NEEDS (Diet, Food/Nutrient-Drug Interaction):    [x] None Identified   [] Identified and Education Provided/Documented   [] Identified and Pt declined/was not appropriate     Cultural, Temple, OR Ethnic Dietary Needs:    [x] None Identified   [] Identified and Addressed     [x] Interdisciplinary Care Plan Reviewed/Documented    [x] Discharge Planning: Continue home TF regimen    [] Participated in Interdisciplinary Rounds    NUTRITION RISK:    [x] High              [] Moderate           []  Low  []  Minimal/Uncompromised      Robyn Mckenzie RDN  Pager [phone]  Weekend Pager [phone]

## 2018-09-24 NOTE — ADT AUTH CERT NOTES
Vomiting - Care Day 13 (9/22/2018) by Dorothea Ibarra RN        Review Status Review Entered       Completed 9/23/2018       Details              Care Day: 13 Care Date: 9/22/2018 Level of Care: Inpatient Floor       Guideline Day 2        Clinical Status       (X) * Hemodynamic stability       (X) * Vomiting absent or controlled       ( ) * Electrolyte levels adequate       (X) * Life-threatening causes of vomiting excluded       (X) * Pain absent or managed       ( ) * Discharge plans and education understood              Activity       (X) * Ambulatory              Routes       ( ) * Oral hydration       ( ) * Liquid or advanced diet       (X) Oral medications              Interventions       (X) Electrolytes              Medications       (X) Possible antiemetics                                   * Milestone              Additional Notes       9/22/18        Vs 98 118/54 82 16 93%       Labs- wbc 1.6, glu 109, creat 1.52       IV protonix 40 kg every day       S/p chemo /RT       S/p C2 of cisplatin       Currently on hold       Getting XRT       Have been trying to transfer [place] [facility] [place] because of [place], now we are hopefully getting closer to discharge and transferring him would do patient no good       Severe mucositis                IV compazine 10 mg x 1, IV morphine 10 mg x 5, IV protonix 40 mg every day,           Vomiting - Care Day 12 (9/21/2018) by Dorothea Ibarra RN        Review Status Review Entered       Completed 9/23/2018       Details              Care Day: 12 Care Date: 9/21/2018 Level of Care: Inpatient Floor       Guideline Day 2        Clinical Status       (X) * Hemodynamic stability       (X) * Vomiting absent or controlled       ( ) * Electrolyte levels adequate       (X) * Life-threatening causes of vomiting excluded       (X) * Pain absent or managed       ( ) * Discharge plans and education understood              Activity       (X) * Ambulatory              Routes       ( ) * Oral hydration       ( ) * Liquid or advanced diet       (X) Oral medications              Interventions       (X) Electrolytes              Medications       (X) Possible antiemetics                                   * Milestone              Additional Notes       9/21/18       Vs 98.4 132/85 72 18 94% Labs- wbc 1.3, plat 109       Mouth pain still severe but a little improved overall, not taking anything by mouth        /p chemo /RT       S/p C2 of cisplatin       Currently on hold       Getting XRT       Have been trying to transfer patient to [facility] because of insurance, now we are hopefully getting closer to discharge and transferring him would do patient no good       Severe mucositis        IV protonix 40 mg, IV morphine 10 mg x 3,                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 PEG tube feedings as tolerated.  Symptomatic meds - magic mouthwash, viscous lidocaine, morphine as tolerated.               pain control - Cont with fentanyl patch, Morphine, magic mouthwash, viscous lidocaine.  Appreciate palliative care's help in trying to transition him to liquid oxycodone so he can take through PEG       Nausea - on zofran           Vomiting - Care Day 11 (9/20/2018) by Barney Martinez RN        Review Status Review Entered       Completed 9/21/2018       Details              Care Day: 11 Care Date: 9/20/2018 Level of Care: Inpatient Floor       Guideline Day 2        Clinical Status       (X) * Hemodynamic stability       9/21/2018 4:33 PM EDT by Moses Bernard         VS: 97.6, 83, 18, 129/76, 97%              (X) * Vomiting absent or controlled       9/21/2018 4:33 PM EDT by Moses Bernard         zofran 8mg IV PRN x 1,              ( ) * Electrolyte levels adequate       (X) * Life-threatening causes of vomiting excluded       (X) * Pain absent or managed       9/21/2018 4:33 PM EDT by Moses Bernard         morphine 3mg IV PRN x 3,              ( ) * Discharge plans and education understood              Activity       (X) * Ambulatory       9/21/2018 4:33 PM EDT by Mima Schilder amb with assist,                     Routes       ( ) * Oral hydration       ( ) * Liquid or advanced diet       (X) Oral medications       9/21/2018 4:33 PM EDT by Mima Schilder         neurontin 300mg PO q8h, magic mouthwash 5ml PO QID, nystatin 500,000u PO QID,oxycodone 30mg gtube PRN x 3,                     Interventions       (X) Electrolytes       9/21/2018 4:33 PM EDT by Mima Schilder         Mag Sulf 2g IV x 1, KCL 10mEq IV x 4,                     Medications       (X) Possible antiemetics       9/21/2018 4:33 PM EDT by Mima Schilder         zofran 8mg IV PRN x 1,                     9/21/2018 4:33 PM EDT by Mima Schilder       Subject: Additional Clinical Information       9/20/18VS: 97.6, 83, 18, 129/76, 97%ABnl labs: WBC 2.0, RBC 2.93, H/H 8.3/24.7, Plt 142, K 3.3, Creat 1.51, Ca 7.6, Mag 1.5, prot tot 6.3, alb 2.7, Meds: NS 125ml/hr IV cont, protonix 40mg PO QD, PLan:   IVF, IV antiemetics, IV pain meds, CBC/MAG/CMP daily, clear liquid diet tube feeding with tray, SLP, xfr to [facility] when bed available, HemeOnc: Mouth pain still severe but a little improved overall, not taking anything by mouthleft tonsillar carcinoma   S/p chemo /RTS/p C2 of cisplatinCurrently on holdGetting XRTTransfer to [facility] when [facility] availableSevere mucositis   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 PEG tube feedings as tolerated.  Symptomatic meds - magic mouthwash, viscous lidocaine, morphine as tolerated.  pain control - Cont with fentanyl patch, Morphine, magic mouthwash, viscous lidocaine.   Ask for palliative care assistance  Nausea - on zofran  One low grade temp-not neutropenic-afebrile now-monitor  Hypokalemia:- replete  Hypomagnesemia-repleteCM: CM contacted [facility] transfer center to enquire about bed availabiltiy.   CM was informed that patient had been removed from transfer list due to [facility] being on inpatient diversion.  CM contacted Dr. Rocael Devries who will speak to transfer center to re-establish need for patient transfer and identify accepting MD for occasion that bed is available. RD:Continue current dietContinue current TF order. Try to get to goal of 50 ml/hr and Encourage pt to leave pump running x 24 hrs. Could reduce free water flushes to a minimum of 75 ml 6 hrs if IVF is going to continue                                   * Milestone              Additional Notes       9/20/18       Pall Med: PLAN:       1. Met with patient at bedside, to discuss a plan to get him on a pain regimen that prepared him for going home       2. He tells me that he was on oxycodone at home, and it worked pretty well, but when he started having this nausea, and \"heaving\" his pain got worse       3. He is utilizing the IV morphine here, because it works faster, and he does not like how much water he has to have put into his G-Tube with the PO Oxycodone, he feels it makes him sick       4. We talked about a liquid formulation, and how it will be easier, and require less water- he is very open to this idea       5. He feels that the oxycodone works well, it just takes to long to work, and that is why he likes the IV morphine. Ladarius Pedraza that he is used to this, and had success with it controlling his pain in the past, will stick with oxycodone rather than switching him to PO morphine       6. Encouraged him to use the Oxycodone every 4 hours, and not let his pain get out of control, where he feels he needs the quick relief from the morphine       7. Encouraged him to utilize the morphine ONLY for pain unrelieved with the oxycodone       8. Stopped Oxycodone 10mg and 15mg tables       9. Started Oxycodone 20mg/1ml Liquid- give 1mg (20mg) every 4 hours as needed for pain       10. Decreased IV Morphine to 3mg every 4 hours as needed for severe pain unrelieved by Oxycodone       11.  I don't think we need to increase his Fentanyl patch yet, based on his usage in the past 24 hours, but I will reevaluate this tomorrow after he has a chance to use the liquid Oxycodone       12. He tells me he has no issues with constipation       13. Encouraged him to utilize the zofran or the compazine to see if it helps with the \"heaving\"       14. Initial consult note routed to primary continuity provider       15.  Communicated plan of care with: Palliative IDT

## 2018-09-24 NOTE — ROUTINE PROCESS
Oncology Nursing Communication Tool  8:50 AM  9/24/2018     Bedside shift change report given to ROCIO Clark (incoming nurse) by Aileen Quiroz RN (outgoing nurse) on [facility]. Report included the following information SBAR, Kardex, Intake/Output and Recent Results. Shift Summary: pt med as needed for c/o of pain. Ativan x1 overnight per pt req for anxiety/sleep. Less upper airway congestion & pt reports less \"tight feeling\" in left neck than sat night. Pt again requested tube feeding stopped about 2030 last night. All suction equip changed out. Low grade fever with temp max 100.2. Issues for physician to address: pt  Concerned about amt of secretions & if he's \" catching a cold\". Low grade fevers. Tachy at times. Potassium 3.4. Oncology Shift Note   Admission Date 9/10/2018   Admission Diagnosis nausea, vomiting, dehydration  Intractable nausea and vomiting   Code Status Full Code   Consults IP CONSULT TO RADIATION ONCOLOGY  IP CONSULT TO PALLIATIVE CARE - PROVIDER      Cardiac Monitoring [] Yes [] No      Purposeful Hourly Rounding [] Yes    Jason Score Total Score: 2   Jason score 3 or > [] Bed Alarm [] Avasys [] 1:1 sitter [] Patient refused (Place signed refusal form in chart)      Pain Managed [x] Yes [] No    Key Pain Meds             oxyCODONE-acetaminophen (PERCOCET) 5-325 mg per tablet  (Taking) Take 1 Tab by mouth every four (4) hours as needed for Pain. Max Daily Amount: 6 Tabs. Influenza Vaccine Received Flu Vaccine for Current Season (usually Sept-March): Yes    Patient/Guardian Refused (Notify MD): No      Oxygen needs?  [x] Room air Oxygen @  []1L    []2L    []3L   []4L    []5L   []6L     Use home O2? [] Yes [x] No  Perform O2 challenge test using  smartphrase (.oxygenchallenge)      Last bowel movement Last Bowel Movement Date: 09/23/18  bowel movement      Urinary Catheter             LDAs             Venous Access Device 09/18/18 Upper chest (subclavicular area, right (Active)   Central Line Being Utilized Yes 9/23/2018  8:48 PM   Criteria for Appropriate Use Limited/no vessel suitable for conventional peripheral access 9/23/2018  8:48 PM   Site Assessment Clean, dry, & intact 9/23/2018  8:48 PM   Date of Last Dressing Change 09/18/18 9/23/2018  8:48 PM   Dressing Status Clean, dry, & intact 9/23/2018  8:48 PM   Dressing Type Tape;Transparent 9/23/2018  8:48 PM   Date Accessed (Medial Site) 09/18/18 9/23/2018  8:48 PM   Positive Blood Return (Medial Site) Yes 9/23/2018  8:48 PM   Action Taken (Medial Site) Flushed; Infusing 9/23/2018  8:48 PM   Alcohol Cap Used Yes 9/21/2018  3:19 AM            PEG/Gastrostomy Tube 09/10/18 (Active)   Site Assessment Clean, dry, & intact 9/23/2018  8:48 PM   Dressing Status Clean, dry, & intact 9/23/2018  8:48 PM   G Port Status Clamped; Other(comment) 9/23/2018  8:48 PM   Action Taken Placement verified (comment) 9/23/2018  8:48 PM   Gastric Residual (mL) 0 ml 9/23/2018 10:55 AM   Tube Feeding/Formula Options Twocal HN 9/22/2018  8:16 PM   Tube Feeding/Verify Rate (mL/hr) 40 9/23/2018  8:48 PM   Water Flush Volume (mL) 150 mL 9/23/2018  8:48 PM   Intake (ml) 270 ml 9/23/2018  8:48 PM   Medication Volume 50 ml 9/22/2018 10:00 PM                   Readmission Risk Assessment Tool Score Low Risk            6       Total Score        3 Has Seen PCP in Last 6 Months (Yes=3, No=0)    3 Patient Length of Stay (>5 days = 3)        Criteria that do not apply:    . Living with Significant Other. Assisted Living. LTAC. SNF. or   Rehab    IP Visits Last 12 Months (1-3=4, 4=9, >4=11)    Pt.  Coverage (Medicare=5 , Medicaid, or Self-Pay=4)    Charlson Comorbidity Score (Age + Comorbid Conditions)       Expected Length of Stay 2d 7h   Actual Length of Stay [address], RN

## 2018-09-25 VITALS
BODY MASS INDEX: 40.39 KG/M2 | DIASTOLIC BLOOD PRESSURE: 78 MMHG | HEIGHT: 69 IN | OXYGEN SATURATION: 100 % | RESPIRATION RATE: 18 BRPM | SYSTOLIC BLOOD PRESSURE: 129 MMHG | WEIGHT: 272.71 LBS | TEMPERATURE: 99.3 F | HEART RATE: 85 BPM

## 2018-09-25 LAB — MAGNESIUM SERPL-MCNC: 1.9 MG/DL (ref 1.6–2.4)

## 2018-09-25 PROCEDURE — 74011000250 HC RX REV CODE- 250: Performed by: INTERNAL MEDICINE

## 2018-09-25 PROCEDURE — 74011250637 HC RX REV CODE- 250/637: Performed by: INTERNAL MEDICINE

## 2018-09-25 PROCEDURE — 77386 HC IMRT TRMT DLVR COMPL: CPT

## 2018-09-25 PROCEDURE — 74011250637 HC RX REV CODE- 250/637: Performed by: NURSE PRACTITIONER

## 2018-09-25 PROCEDURE — 74011250636 HC RX REV CODE- 250/636: Performed by: NURSE PRACTITIONER

## 2018-09-25 PROCEDURE — 36415 COLL VENOUS BLD VENIPUNCTURE: CPT | Performed by: INTERNAL MEDICINE

## 2018-09-25 PROCEDURE — 83735 ASSAY OF MAGNESIUM: CPT | Performed by: INTERNAL MEDICINE

## 2018-09-25 PROCEDURE — 74011250636 HC RX REV CODE- 250/636: Performed by: INTERNAL MEDICINE

## 2018-09-25 PROCEDURE — 74011250636 HC RX REV CODE- 250/636

## 2018-09-25 RX ORDER — HEPARIN 100 UNIT/ML
SYRINGE INTRAVENOUS
Status: COMPLETED
Start: 2018-09-25 | End: 2018-09-25

## 2018-09-25 RX ADMIN — LIDOCAINE HYDROCHLORIDE 15 ML: 20 SOLUTION ORAL; TOPICAL at 01:44

## 2018-09-25 RX ADMIN — LIDOCAINE HYDROCHLORIDE 5 ML: 20 SOLUTION ORAL; TOPICAL at 01:25

## 2018-09-25 RX ADMIN — OXYCODONE HYDROCHLORIDE 20 MG: 100 SOLUTION ORAL at 10:14

## 2018-09-25 RX ADMIN — NYSTATIN 500000 UNITS: 100000 SUSPENSION ORAL at 10:13

## 2018-09-25 RX ADMIN — LIDOCAINE HYDROCHLORIDE 5 ML: 20 SOLUTION ORAL; TOPICAL at 14:08

## 2018-09-25 RX ADMIN — LIDOCAINE HYDROCHLORIDE 5 ML: 20 SOLUTION ORAL; TOPICAL at 10:14

## 2018-09-25 RX ADMIN — OXYCODONE HYDROCHLORIDE 20 MG: 100 SOLUTION ORAL at 01:03

## 2018-09-25 RX ADMIN — NYSTATIN 500000 UNITS: 100000 SUSPENSION ORAL at 14:09

## 2018-09-25 RX ADMIN — LORAZEPAM 1 MG: 2 INJECTION INTRAMUSCULAR; INTRAVENOUS at 01:20

## 2018-09-25 RX ADMIN — LIDOCAINE HYDROCHLORIDE 15 ML: 20 SOLUTION ORAL; TOPICAL at 10:14

## 2018-09-25 RX ADMIN — LIDOCAINE HYDROCHLORIDE 15 ML: 20 SOLUTION ORAL; TOPICAL at 16:23

## 2018-09-25 RX ADMIN — GABAPENTIN 300 MG: 250 SUSPENSION ORAL at 14:09

## 2018-09-25 RX ADMIN — MORPHINE SULFATE 3 MG: 10 INJECTION INTRAVENOUS at 14:09

## 2018-09-25 RX ADMIN — GABAPENTIN 300 MG: 250 SUSPENSION ORAL at 05:23

## 2018-09-25 RX ADMIN — OXYCODONE HYDROCHLORIDE 20 MG: 100 SOLUTION ORAL at 05:23

## 2018-09-25 RX ADMIN — Medication 500 UNITS: at 17:33

## 2018-09-25 NOTE — PROGRESS NOTES
Oncology Nursing Communication Tool  8:02 PM  9/24/2018     Bedside shift change report given to Ricardo Tejeda RN (incoming nurse) by Preethi Burns (outgoing nurse) on [address] [facility]. Report included the following information SBAR, Kardex, MAR and Recent Results. Shift Summary:       Issues for physician to address: Possible d/c with home health         Oncology Shift Note   Admission Date 9/10/2018   Admission Diagnosis nausea, vomiting, dehydration  Intractable nausea and vomiting   Code Status Full Code   Consults IP CONSULT TO RADIATION ONCOLOGY  IP CONSULT TO PALLIATIVE CARE - PROVIDER      Cardiac Monitoring [] Yes [] No      Purposeful Hourly Rounding [] Yes    Jason Score Total Score: 2   Jason score 3 or > [] Bed Alarm [] Avasys [] 1:1 sitter [] Patient refused (Place signed refusal form in chart)      Pain Managed [] Yes [] No    Key Pain Meds             fentaNYL ([address]) 100 mcg/hr PATCH Starting on 9/25/2018. 1 Patch by TransDERmal route every seventy-two (72) hours. Max Daily Amount: 1 Patch. Indications: SEVERE PAIN WITH OPIOID TOLERANCE    oxyCODONE (ROXICODONE INTENSOL) 20 mg/mL concentrated solution  (Taking) 1 mL by Per G Tube route every four (4) hours as needed. Max Daily Amount: 120 mg. Indications: SEVERE PAIN WITH OPIOID TOLERANCE    oxyCODONE-acetaminophen (PERCOCET) 5-325 mg per tablet  (Taking) Take 1 Tab by mouth every four (4) hours as needed for Pain. Max Daily Amount: 6 Tabs. Influenza Vaccine Received Flu Vaccine for Current Season (usually Sept-March): Yes    Patient/Guardian Refused (Notify MD): No      Oxygen needs?  [] Room air Oxygen @  []1L    []2L    []3L   []4L    []5L   []6L     Use home O2? [] Yes [] No  Perform O2 challenge test using  smartphrase (.oxygenchallenge)      Last bowel movement Last Bowel Movement Date: 09/23/18  bowel movement      Urinary Catheter             LDAs             Venous Access Device 09/18/18 Upper chest (subclavicular area, right (Active)   Central Line Being Utilized Yes 9/24/2018  4:24 PM   Criteria for Appropriate Use Limited/no vessel suitable for conventional peripheral access 9/24/2018  4:24 PM   Site Assessment Clean, dry, & intact 9/24/2018  4:24 PM   Date of Last Dressing Change 09/18/18 9/24/2018  4:24 PM   Dressing Status Clean, dry, & intact 9/24/2018  4:24 PM   Dressing Type Disk with Chlorhexadine gluconate (CHG); Transparent 9/24/2018  4:24 PM   Date Accessed (Medial Site) 09/18/18 9/24/2018  4:24 PM   Positive Blood Return (Medial Site) Yes 9/24/2018  4:24 PM   Action Taken (Medial Site) Flushed; Infusing 9/23/2018  8:48 PM   Alcohol Cap Used Yes 9/21/2018  3:19 AM            PEG/Gastrostomy Tube 09/10/18 (Active)   Site Assessment Clean, dry, & intact 9/24/2018  4:24 PM   Dressing Status Clean, dry, & intact 9/24/2018  4:24 PM   G Port Status Clamped 9/24/2018  4:24 PM   Action Taken Other (comment) 9/24/2018  9:00 AM   Gastric Residual (mL) 0 ml 9/23/2018 10:55 AM   Tube Feeding/Formula Options Twocal HN 9/22/2018  8:16 PM   Tube Feeding/Verify Rate (mL/hr) 40 9/23/2018  8:48 PM   Water Flush Volume (mL) 150 mL 9/23/2018  8:48 PM   Intake (ml) 270 ml 9/23/2018  8:48 PM   Medication Volume 50 ml 9/22/2018 10:00 PM                   Readmission Risk Assessment Tool Score Low Risk            6       Total Score        3 Has Seen PCP in Last 6 Months (Yes=3, No=0)    3 Patient Length of Stay (>5 days = 3)        Criteria that do not apply:    . Living with Significant Other. Assisted Living. LTAC. SNF. or   Rehab    IP Visits Last 12 Months (1-3=4, 4=9, >4=11)    Pt.  Coverage (Medicare=5 , Medicaid, or Self-Pay=4)    Charlson Comorbidity Score (Age + Comorbid Conditions)       Expected Length of Stay 2d 7h   Actual Length of Stay [address]

## 2018-09-25 NOTE — PROGRESS NOTES
Problem: Pressure Injury - Risk of  Goal: *Prevention of pressure injury  Document Sage Scale and appropriate interventions in the flowsheet. Outcome: Progressing Towards Goal  Pressure Injury Interventions:             Activity Interventions: Increase time out of bed    Mobility Interventions: HOB 30 degrees or less    Nutrition Interventions: Document food/fluid/supplement intake, Offer support with meals,snacks and hydration    Friction and Shear Interventions: HOB 30 degrees or less

## 2018-12-29 ENCOUNTER — APPOINTMENT (OUTPATIENT)
Dept: CT IMAGING | Age: 43
End: 2018-12-29
Attending: EMERGENCY MEDICINE
Payer: COMMERCIAL

## 2018-12-29 ENCOUNTER — HOSPITAL ENCOUNTER (EMERGENCY)
Age: 43
Discharge: OTHER INSTITUTION | End: 2018-12-29
Attending: EMERGENCY MEDICINE
Payer: COMMERCIAL

## 2018-12-29 VITALS
TEMPERATURE: 98.1 F | HEART RATE: 81 BPM | BODY MASS INDEX: 34.07 KG/M2 | HEIGHT: 69 IN | DIASTOLIC BLOOD PRESSURE: 51 MMHG | SYSTOLIC BLOOD PRESSURE: 127 MMHG | WEIGHT: 230 LBS | OXYGEN SATURATION: 100 % | RESPIRATION RATE: 16 BRPM

## 2018-12-29 DIAGNOSIS — D49.0 NEOPLASM OF TONSIL: ICD-10-CM

## 2018-12-29 DIAGNOSIS — E86.0 DEHYDRATION: Primary | ICD-10-CM

## 2018-12-29 DIAGNOSIS — R25.2 TRISMUS: ICD-10-CM

## 2018-12-29 LAB
ALBUMIN SERPL-MCNC: 3.2 G/DL (ref 3.5–5)
ALBUMIN/GLOB SERPL: 0.7 {RATIO} (ref 1.1–2.2)
ALP SERPL-CCNC: 54 U/L (ref 45–117)
ALT SERPL-CCNC: 16 U/L (ref 12–78)
ANION GAP SERPL CALC-SCNC: 12 MMOL/L (ref 5–15)
AST SERPL-CCNC: 13 U/L (ref 15–37)
BASOPHILS # BLD: 0 K/UL (ref 0–0.1)
BASOPHILS NFR BLD: 0 % (ref 0–1)
BILIRUB SERPL-MCNC: 0.4 MG/DL (ref 0.2–1)
BUN SERPL-MCNC: 25 MG/DL (ref 6–20)
BUN/CREAT SERPL: 15 (ref 12–20)
CALCIUM SERPL-MCNC: 9.2 MG/DL (ref 8.5–10.1)
CHLORIDE SERPL-SCNC: 94 MMOL/L (ref 97–108)
CO2 SERPL-SCNC: 25 MMOL/L (ref 21–32)
CREAT SERPL-MCNC: 1.71 MG/DL (ref 0.7–1.3)
DIFFERENTIAL METHOD BLD: ABNORMAL
EOSINOPHIL # BLD: 0.1 K/UL (ref 0–0.4)
EOSINOPHIL NFR BLD: 2 % (ref 0–7)
ERYTHROCYTE [DISTWIDTH] IN BLOOD BY AUTOMATED COUNT: 12.3 % (ref 11.5–14.5)
GLOBULIN SER CALC-MCNC: 4.7 G/DL (ref 2–4)
GLUCOSE SERPL-MCNC: 93 MG/DL (ref 65–100)
HCT VFR BLD AUTO: 27.4 % (ref 36.6–50.3)
HGB BLD-MCNC: 9.3 G/DL (ref 12.1–17)
IMM GRANULOCYTES # BLD: 0 K/UL (ref 0–0.04)
IMM GRANULOCYTES NFR BLD AUTO: 0 % (ref 0–0.5)
LACTATE SERPL-SCNC: 0.9 MMOL/L (ref 0.4–2)
LYMPHOCYTES # BLD: 0.6 K/UL (ref 0.8–3.5)
LYMPHOCYTES NFR BLD: 8 % (ref 12–49)
MAGNESIUM SERPL-MCNC: 1.8 MG/DL (ref 1.6–2.4)
MCH RBC QN AUTO: 29.2 PG (ref 26–34)
MCHC RBC AUTO-ENTMCNC: 33.9 G/DL (ref 30–36.5)
MCV RBC AUTO: 85.9 FL (ref 80–99)
MONOCYTES # BLD: 0.6 K/UL (ref 0–1)
MONOCYTES NFR BLD: 8 % (ref 5–13)
NEUTS SEG # BLD: 6 K/UL (ref 1.8–8)
NEUTS SEG NFR BLD: 82 % (ref 32–75)
NRBC # BLD: 0 K/UL (ref 0–0.01)
NRBC BLD-RTO: 0 PER 100 WBC
PLATELET # BLD AUTO: 259 K/UL (ref 150–400)
PMV BLD AUTO: 8.7 FL (ref 8.9–12.9)
POTASSIUM SERPL-SCNC: 3.9 MMOL/L (ref 3.5–5.1)
PROT SERPL-MCNC: 7.9 G/DL (ref 6.4–8.2)
RBC # BLD AUTO: 3.19 M/UL (ref 4.1–5.7)
RBC MORPH BLD: ABNORMAL
SODIUM SERPL-SCNC: 131 MMOL/L (ref 136–145)
WBC # BLD AUTO: 7.3 K/UL (ref 4.1–11.1)

## 2018-12-29 PROCEDURE — 96376 TX/PRO/DX INJ SAME DRUG ADON: CPT

## 2018-12-29 PROCEDURE — 99285 EMERGENCY DEPT VISIT HI MDM: CPT

## 2018-12-29 PROCEDURE — 74011250636 HC RX REV CODE- 250/636: Performed by: EMERGENCY MEDICINE

## 2018-12-29 PROCEDURE — 96375 TX/PRO/DX INJ NEW DRUG ADDON: CPT

## 2018-12-29 PROCEDURE — 74011250637 HC RX REV CODE- 250/637: Performed by: EMERGENCY MEDICINE

## 2018-12-29 PROCEDURE — 74011636320 HC RX REV CODE- 636/320: Performed by: EMERGENCY MEDICINE

## 2018-12-29 PROCEDURE — 83735 ASSAY OF MAGNESIUM: CPT

## 2018-12-29 PROCEDURE — 96361 HYDRATE IV INFUSION ADD-ON: CPT

## 2018-12-29 PROCEDURE — 96374 THER/PROPH/DIAG INJ IV PUSH: CPT

## 2018-12-29 PROCEDURE — 70491 CT SOFT TISSUE NECK W/DYE: CPT

## 2018-12-29 PROCEDURE — 83605 ASSAY OF LACTIC ACID: CPT

## 2018-12-29 PROCEDURE — 87040 BLOOD CULTURE FOR BACTERIA: CPT

## 2018-12-29 PROCEDURE — 85025 COMPLETE CBC W/AUTO DIFF WBC: CPT

## 2018-12-29 PROCEDURE — 80053 COMPREHEN METABOLIC PANEL: CPT

## 2018-12-29 PROCEDURE — 36415 COLL VENOUS BLD VENIPUNCTURE: CPT

## 2018-12-29 RX ORDER — MORPHINE SULFATE 4 MG/ML
4 INJECTION INTRAVENOUS
Status: COMPLETED | OUTPATIENT
Start: 2018-12-29 | End: 2018-12-29

## 2018-12-29 RX ORDER — METOCLOPRAMIDE HYDROCHLORIDE 5 MG/ML
10 INJECTION INTRAMUSCULAR; INTRAVENOUS
Status: COMPLETED | OUTPATIENT
Start: 2018-12-29 | End: 2018-12-29

## 2018-12-29 RX ORDER — OXYCODONE HCL 5 MG/5 ML
20 SOLUTION, ORAL ORAL ONCE
Status: DISCONTINUED | OUTPATIENT
Start: 2018-12-29 | End: 2018-12-29

## 2018-12-29 RX ORDER — ONDANSETRON 2 MG/ML
4 INJECTION INTRAMUSCULAR; INTRAVENOUS
Status: COMPLETED | OUTPATIENT
Start: 2018-12-29 | End: 2018-12-29

## 2018-12-29 RX ORDER — SODIUM CHLORIDE 0.9 % (FLUSH) 0.9 %
10 SYRINGE (ML) INJECTION
Status: COMPLETED | OUTPATIENT
Start: 2018-12-29 | End: 2018-12-29

## 2018-12-29 RX ORDER — DIPHENHYDRAMINE HCL 12.5MG/5ML
25 ELIXIR ORAL
Status: COMPLETED | OUTPATIENT
Start: 2018-12-29 | End: 2018-12-29

## 2018-12-29 RX ADMIN — SODIUM CHLORIDE 1000 ML: 900 INJECTION, SOLUTION INTRAVENOUS at 16:40

## 2018-12-29 RX ADMIN — METOCLOPRAMIDE 10 MG: 5 INJECTION, SOLUTION INTRAMUSCULAR; INTRAVENOUS at 18:21

## 2018-12-29 RX ADMIN — MORPHINE SULFATE 4 MG: 4 INJECTION, SOLUTION INTRAMUSCULAR; INTRAVENOUS at 15:36

## 2018-12-29 RX ADMIN — SODIUM CHLORIDE 500 ML: 900 INJECTION, SOLUTION INTRAVENOUS at 18:21

## 2018-12-29 RX ADMIN — IOPAMIDOL 100 ML: 612 INJECTION, SOLUTION INTRAVENOUS at 16:37

## 2018-12-29 RX ADMIN — SODIUM CHLORIDE 1000 ML: 900 INJECTION, SOLUTION INTRAVENOUS at 15:36

## 2018-12-29 RX ADMIN — DIPHENHYDRAMINE HYDROCHLORIDE 25 MG: 12.5 SOLUTION ORAL at 18:21

## 2018-12-29 RX ADMIN — Medication 10 ML: at 16:37

## 2018-12-29 RX ADMIN — ONDANSETRON 4 MG: 2 INJECTION INTRAMUSCULAR; INTRAVENOUS at 15:36

## 2018-12-29 RX ADMIN — MORPHINE SULFATE 4 MG: 4 INJECTION, SOLUTION INTRAMUSCULAR; INTRAVENOUS at 18:21

## 2018-12-29 NOTE — ED NOTES
Emergency Department Nursing Plan of Care       The Nursing Plan of Care is developed from the Nursing assessment and Emergency Department Attending provider initial evaluation. The plan of care may be reviewed in the ED Provider note.     The Plan of Care was developed with the following considerations:   Patient / Family readiness to learn indicated by:verbalized understanding  Persons(s) to be included in education: patient  Barriers to Learning/Limitations:No    Signed     Vinicius Galvez RN    12/29/2018   2:36 PM

## 2018-12-29 NOTE — ED PROVIDER NOTES
EMERGENCY DEPARTMENT HISTORY AND PHYSICAL EXAM      Date: 12/29/2018  Patient Name: Jeremy Liu    History of Presenting Illness     Chief Complaint   Patient presents with    Fatigue       History Provided By: Patient    HPI: Jeremy Liu, 37 y.o. male with PMHx significant for tonsillar cancer, presents via EMS to the ED with cc of ongoing, worsening fatigue and generalized weakness x 1.5-2 weeks. Pt reports decreased appetite with poor PO intake, nausea, and dry heaves. He explains prior to onset of sxs he had been taking some soft foods and drinks by mouth, however over the past 1.5-2 weeks pt has not been doing any peg tube feedings or eating by mouth. Pt reports decreased urine output and states he hasn't had a bm in 1 week. In addition, pt reports left throat pain that radiates into the left face and ear. He explains he has not been able to open his mouth over the past 2 weeks. Pt completed radiation and chemotherapy for tonsillar CA in 10/2018 and is followed by [facility]. He specifically denies any abdominal pain. There are no other complaints, changes, or physical findings at this time. PCP: Kristy Torres MD  Radiology oncology: Donal Marshall MD  Oncologist: Elvia Pleitez MD    No current facility-administered medications on file prior to encounter. Current Outpatient Medications on File Prior to Encounter   Medication Sig Dispense Refill    oxyCODONE (ROXICODONE INTENSOL) 20 mg/mL concentrated solution 1 mL by Per G Tube route every four (4) hours as needed. Max Daily Amount: 120 mg. Indications: SEVERE PAIN WITH OPIOID TOLERANCE 30 mL 0    nystatin (MYCOSTATIN) 100,000 unit/mL suspension Take 5 mL by mouth four (4) times daily.  swish and spit  Indications: oral candidiasis 100 mL 0    aluminum-magnesium hydroxide 200-200 mg/5 mL susp 30 mL, diphenhydrAMINE 12.5 mg/5 mL elix 75 mg, lidocaine 2 % soln 30 mL, sucralfate 100 mg/mL susp 3 g oral solution (compounded) Take 5 mL by mouth four (4) times daily. 1 Bottle 1    fentaNYL (DURAGESIC) 100 mcg/hr PATCH 1 Patch by TransDERmal route every seventy-two (72) hours. Max Daily Amount: 1 Patch. Indications: SEVERE PAIN WITH OPIOID TOLERANCE 5 Patch 0    polyethylene glycol (MIRALAX) 17 gram packet 1 Packet by Per G Tube route daily. 30 Packet 1    lidocaine (XYLOCAINE) 2 % solution Take 15 mL by mouth as needed for Pain. 1 Bottle 1       Past History     Past Medical History:  Past Medical History:   Diagnosis Date    Tonsillar abscess 05/2018    Tonsillar cancer [facility])        Past Surgical History:  Past Surgical History:   Procedure Laterality Date    HX HERNIA REPAIR         Family History:  History reviewed. No pertinent family history. Social History:  Social History     Tobacco Use    Smoking status: Former Smoker     Packs/day: 0.50    Smokeless tobacco: Never Used   Substance Use Topics    Alcohol use: No    Drug use: No       Allergies: Allergies   Allergen Reactions    Nsaids (Non-Steroidal Anti-Inflammatory Drug) Swelling    Aspirin Angioedema    Ibuprofen Angioedema    Naproxen Angioedema         Review of Systems   Review of Systems   Constitutional: Positive for appetite change and fatigue. Negative for chills and fever. HENT: Positive for ear pain (L) and sore throat. Negative for congestion, ear discharge and rhinorrhea.         + L facial pain    Eyes: Negative for pain and visual disturbance. Respiratory: Negative for cough and shortness of breath. Cardiovascular: Negative for chest pain and leg swelling. Gastrointestinal: Positive for nausea. Negative for abdominal pain, diarrhea and vomiting.        + dry heaves   Genitourinary: Positive for decreased urine volume. Negative for dysuria and flank pain. Musculoskeletal: Negative for back pain and neck pain. Skin: Negative for rash and wound. Neurological: Positive for weakness. Negative for dizziness, syncope and headaches. Psychiatric/Behavioral: Negative for self-injury and suicidal ideas. Physical Exam   Physical Exam     GENERAL: alert and oriented, no acute distress  EYES: PEERL, No injection,   HENT: Trismus, dry Mucous membranes, left TM purulent effusion bulging,  NECK: Supple  LUNGS: Airway patent. Non-labored respirations. Breath sounds clear with good air entry bilaterally. HEART: Tachycardic and regular rhythm, No peripheral edema  ABDOMEN: Non-distended and non-tender, without guarding or rebound, peg tube in place   SKIN:  warm, dry  MSK/ EXTREMITIES: Without swelling, tenderness or deformity, symmetric with normal ROM  NEUROLOGICAL: Alert, oriented    Diagnostic Study Results     Labs -     reviewed    Radiologic Studies -     CT Results  (Last 48 hours)               12/29/18 1638  CT NECK SOFT TISSUE W CONT Final result    Impression:  IMPRESSION:    Interval decrease in the previously seen masslike area in the left tonsillar   pillar adjacent to the uvula. The uvula is also smaller in size in the interval.   There is however more prominence of the right tonsillar pillar adjacent to the   edema now present. Recommend correlation with exam.       Narrative:  EXAM: CT NECK SOFT TISSUE W CONT       INDICATION: hx of tonsilar CA, now with pain/trismus       COMPARISON: 6/17/2018. CONTRAST: 100 mL of Isovue-300. TECHNIQUE: Multislice helical CT was performed from the mid calvarium to the   aortic arch during uneventful rapid bolus intravenous contrast administration. Contiguous 2.5 mm axial images were reconstructed and lung and soft tissue   windows were generated. Coronal and sagittal reformations were generated. CT   dose reduction was achieved through use of a standardized protocol tailored for   this examination and automatic exposure control for dose modulation. FINDINGS:   A right IJ Port-A-Cath is in place.        The previously seen masslike fullness of the left tonsillar pillar adjacent to   the uvula has decreased in the interval. The uvula has mildly decreased in size. There is slightly more increased fullness in the right tonsillar pillar. .       No lymphadenopathy. The carotid and jugular vessels enhance normally. The thyroid gland, submandibular salivary glands and parotid glands are normal.       No abnormalities are identified in the visualized portions of the brain or   orbits. The visualized mastoid air cells and paranasal sinuses are clear. The visualized portions of the lung apices are clear. Medical Decision Making   I am the first provider for this patient. I reviewed the vital signs, available nursing notes, past medical history, past surgical history, family history and social history. Vital Signs-Reviewed the patient's vital signs. Pulse Oximetry Analysis - 99% on RA    Records Reviewed: Nursing Notes, Old Medical Records, Ambulance Run Sheet, Previous Radiology Studies and Previous Laboratory Studies    Provider Notes (Medical Decision Making): On presentation the patient is well appearing, in no acute distress and tachycardic. Based on the history and exam the differential diagnosis for this patient includes Sepsis, severe sepsis, dehydration, metabolic abnormality, electrolyte abnormality. Labs notable for RALPH/dehydration and CT neck shows worsening inflammation on the right tonsillar pillar with worsening trismus. After IV fluids and pain medication he did have some improvement in his sx he did not feel that he would be able to care for himself at home. He is requesting transfer to [facility] as this is where his oncologist is. ED Course:   Initial assessment performed. The patients presenting problems have been discussed, and they are in agreement with the care plan formulated and outlined with them. I have encouraged them to ask questions as they arise throughout their visit.     Medications   morphine injection 4 mg (4 mg IntraVENous Given 12/29/18 1536)   ondansetron (ZOFRAN) injection 4 mg (4 mg IntraVENous Given 12/29/18 1536)   sodium chloride 0.9 % bolus infusion 1,000 mL (0 mL IntraVENous IV Completed 12/29/18 1640)   iopamidol (ISOVUE 300) 61 % contrast injection 100 mL (100 mL IntraVENous Given 12/29/18 1637)   sodium chloride (NS) flush 10 mL (10 mL IntraVENous Given 12/29/18 1637)   sodium chloride 0.9 % bolus infusion 1,000 mL (0 mL IntraVENous IV Completed 12/29/18 1742)   metoclopramide HCl (REGLAN) injection 10 mg (10 mg IntraVENous Given 12/29/18 1821)   diphenhydrAMINE (BENADRYL) 12.5 mg/5 mL oral elixir 25 mg (25 mg Per G Tube Given 12/29/18 1821)   sodium chloride 0.9 % bolus infusion 500 mL (0 mL IntraVENous IV Completed 12/29/18 1844)   morphine injection 4 mg (4 mg IntraVENous Given 12/29/18 1821)     CONSULT NOTE:   7:31 PM  Moises Gamez MD spoke with An Mueller DO   Specialty: ED  Discussed pt's hx, disposition, and available diagnostic and imaging results. Reviewed care plans. Consultant agrees with plans as outlined. Dr. Negra Woodward accepts pt for transfer. Critical Care Time:   0    Disposition:  Transfer Note:  7:34 PM  Patient is being transferred to ED at [facility], transfer accepted by Dr. Negra Woodward. The reasons for patient's transfer have been discussed with the patient and available family. They convey agreement and understanding for the need to be transferred as explained to them by Nazario Cooper. Rach Gamez MD     PLAN:  1. Transfer to [facility]      Diagnosis     Clinical Impression:   1. Dehydration    2. Trismus    3. Neoplasm of tonsil        Attestations: This note is prepared by Sourav Arevalo, acting as Scribe for Nazario Cooper. Rcah Gamez MD.    Nazario Cooper. Rach Gamez MD: The scribe's documentation has been prepared under my direction and personally reviewed by me in its entirety.  I confirm that the note above accurately reflects all work, treatment, procedures, and medical decision making performed by me.

## 2018-12-30 NOTE — ED NOTES
Verbal shift change report given to Willy Whitehead RN (oncoming nurse) by LAURA Keller (offgoing nurse). Report included the following information SBAR, ED Summary, MAR and Recent Results.

## 2018-12-30 NOTE — ED NOTES
Pt transferred to [facility]. Care turned over to EMS. Pt left ED in no acute distress.

## 2018-12-30 NOTE — ED NOTES
TRANSFER - OUT REPORT:    Verbal report given to RIVERA Daly RN(name) on Harley Grover  being transferred to [facility] ED(unit) for routine progression of care       Report consisted of patients Situation, Background, Assessment and   Recommendations(SBAR). Information from the following report(s) SBAR, ED Summary, [facility] and Recent Results was reviewed with the receiving nurse. Lines:   Venous Access Device Power Port 08/20/18 (Active)        Opportunity for questions and clarification was provided.       Patient transported with:  EMS

## 2019-01-04 LAB
BACTERIA SPEC CULT: NORMAL
SERVICE CMNT-IMP: NORMAL

## 2019-08-15 ENCOUNTER — HOSPITAL ENCOUNTER (OUTPATIENT)
Dept: PET IMAGING | Age: 44
Discharge: HOME | End: 2019-08-15
Attending: INTERNAL MEDICINE
Payer: MEDICAID

## 2019-08-15 VITALS — WEIGHT: 228 LBS | HEIGHT: 69 IN | BODY MASS INDEX: 33.77 KG/M2

## 2019-08-15 DIAGNOSIS — C09.1 MALIGNANT NEOPLASM OF TONSILLAR PILLARS (ANTERIOR) (POSTERIOR) (HCC): ICD-10-CM

## 2019-08-15 DIAGNOSIS — C05.1 MALIGNANT NEOPLASM OF SOFT PALATE (HCC): ICD-10-CM

## 2019-08-15 PROCEDURE — A9552 F18 FDG: HCPCS

## 2019-08-15 PROCEDURE — 77030003560 HC NDL HUBR BARD -A

## 2019-08-15 PROCEDURE — 74011250636 HC RX REV CODE- 250/636

## 2019-08-15 RX ORDER — HEPARIN 100 UNIT/ML
SYRINGE INTRAVENOUS
Status: COMPLETED
Start: 2019-08-15 | End: 2019-08-15

## 2019-08-15 RX ORDER — HEPARIN SODIUM (PORCINE) LOCK FLUSH IV SOLN 100 UNIT/ML 100 UNIT/ML
500 SOLUTION INTRAVENOUS ONCE
Status: DISCONTINUED | OUTPATIENT
Start: 2019-08-15 | End: 2019-08-15

## 2019-08-15 RX ORDER — HEPARIN 100 UNIT/ML
500 SYRINGE INTRAVENOUS ONCE
Status: COMPLETED | OUTPATIENT
Start: 2019-08-15 | End: 2019-08-15

## 2019-08-15 RX ORDER — SODIUM CHLORIDE 0.9 % (FLUSH) 0.9 %
10 SYRINGE (ML) INJECTION
Status: COMPLETED | OUTPATIENT
Start: 2019-08-15 | End: 2019-08-15

## 2019-08-15 RX ADMIN — Medication 10 ML: at 10:10

## 2019-08-15 RX ADMIN — Medication 500 UNITS: at 10:20

## 2019-08-15 RX ADMIN — HEPARIN 500 UNITS: 100 SYRINGE at 10:20

## 2019-09-13 ENCOUNTER — HOSPITAL ENCOUNTER (OUTPATIENT)
Dept: GENERAL RADIOLOGY | Age: 44
Discharge: HOME | End: 2019-09-13
Attending: OTOLARYNGOLOGY
Payer: MEDICAID

## 2019-09-13 DIAGNOSIS — R13.10 DYSPHAGIA: ICD-10-CM

## 2019-09-13 PROCEDURE — 92611 MOTION FLUOROSCOPY/SWALLOW: CPT

## 2019-09-13 PROCEDURE — 74230 X-RAY XM SWLNG FUNCJ C+: CPT

## 2019-11-21 NOTE — PROGRESS NOTES
MD discussing plan of care for patient. Patient continues to have throat pain and increased secretions. CM has continued to call [facility], however patient has been declined at present time - MD states patient currently receiving appropriate care and they continue to have no beds available.     Amanda Darling RN, BSN, AC   - Medical Oncology  [phone]

## 2020-02-25 ENCOUNTER — OFFICE VISIT (OUTPATIENT)
Dept: INTERNAL MEDICINE CLINIC | Age: 45
End: 2020-02-25

## 2020-02-25 VITALS
RESPIRATION RATE: 18 BRPM | SYSTOLIC BLOOD PRESSURE: 127 MMHG | WEIGHT: 263 LBS | BODY MASS INDEX: 38.95 KG/M2 | DIASTOLIC BLOOD PRESSURE: 86 MMHG | TEMPERATURE: 98.4 F | OXYGEN SATURATION: 94 % | HEART RATE: 99 BPM | HEIGHT: 69 IN

## 2020-02-25 DIAGNOSIS — K59.03 DRUG INDUCED CONSTIPATION: ICD-10-CM

## 2020-02-25 DIAGNOSIS — Z23 ENCOUNTER FOR IMMUNIZATION: ICD-10-CM

## 2020-02-25 DIAGNOSIS — R39.11 URINARY HESITANCY: ICD-10-CM

## 2020-02-25 DIAGNOSIS — C09.9 TONSIL CANCER (HCC): Primary | ICD-10-CM

## 2020-02-25 DIAGNOSIS — Z13.220 SCREENING, LIPID: ICD-10-CM

## 2020-02-25 RX ORDER — POLYETHYLENE GLYCOL 3350 17 G/17G
17 POWDER, FOR SOLUTION ORAL DAILY
Qty: 30 PACKET | Refills: 1 | Status: SHIPPED | OUTPATIENT
Start: 2020-02-25

## 2020-02-25 RX ORDER — CETIRIZINE HCL 10 MG
TABLET ORAL
COMMUNITY
Start: 2020-01-27

## 2020-02-25 RX ORDER — FAMOTIDINE 40 MG/1
TABLET, FILM COATED ORAL
COMMUNITY
Start: 2020-01-27 | End: 2022-02-16

## 2020-02-25 RX ORDER — GABAPENTIN 300 MG/1
CAPSULE ORAL
COMMUNITY
Start: 2020-01-14

## 2020-02-25 RX ORDER — POLYETHYLENE GLYCOL 3350 17 G/17G
17 POWDER, FOR SOLUTION ORAL DAILY
Qty: 30 PACKET | Refills: 1 | Status: SHIPPED | OUTPATIENT
Start: 2020-02-25 | End: 2020-02-25 | Stop reason: SDUPTHER

## 2020-02-25 NOTE — PROGRESS NOTES
Subjective: (As above and below)     Chief Complaint   Patient presents with   [address] is a 40y.o. year old male who presents to [facility] care - last PCP several mo ago    He is followed by: Oncology: for tonsillar cancer, reports completed chemo/radiation. Has regular follow up    Pain mgmt: r/t cancer pain    He quit smoking. He has a R upper wall chest port that is in place still -       He reports generalized weakness due to deconditioning, he uses a cane mostly, sometimes a rollator he has done PT. Had a fall last week due to leg \"weakness\" denies resulting injury    Denies sleep apnea s/s        Reviewed PmHx, RxHx, FmHx, SocHx, AllgHx and updated in chart. History reviewed. No pertinent family history. Past Medical History:   Diagnosis Date    Tonsillar abscess 2018    Tonsillar cancer [facility])     dx 2018      Social History     Socioeconomic History    Marital status: SINGLE     Spouse name: Not on file    Number of children: Not on file    Years of education: Not on file    Highest education level: Not on file   Tobacco Use    Smoking status: Former Smoker     Packs/day: 0.50     Last attempt to quit: 2017     Years since quittin.6    Smokeless tobacco: Never Used   Substance and Sexual Activity    Alcohol use: No    Drug use: Yes     Types: Marijuana    Sexual activity: Yes     Partners: Female     Birth control/protection: None          Current Outpatient Medications   Medication Sig    famotidine (PEPCID) 40 mg tablet TAKE ONE TABLET BY MOUTH AT BEDTIME    gabapentin (NEURONTIN) 300 mg capsule     cetirizine (ZYRTEC) 10 mg tablet TAKE 1 TO 2 TABLETS BY MOUTH ONCE OR TWICE A DAY. START BY TAKING AT NIGHT. IF TOLERATED, TAKE MORNING AND NIGHT.  polyethylene glycol (MIRALAX) 17 gram packet Take 1 Packet by mouth daily.     aluminum-magnesium hydroxide 200-200 mg/5 mL susp 30 mL, diphenhydrAMINE 12.5 mg/5 mL elix 75 mg, lidocaine 2 % soln 30 mL, sucralfate 100 mg/mL susp 3 g oral solution (compounded) Take 5 mL by mouth four (4) times daily.  fentaNYL (DURAGESIC) 100 mcg/hr PATCH 1 Patch by TransDERmal route every seventy-two (72) hours. Max Daily Amount: 1 Patch. Indications: SEVERE PAIN WITH OPIOID TOLERANCE    oxyCODONE (ROXICODONE INTENSOL) 20 mg/mL concentrated solution 1 mL by Per G Tube route every four (4) hours as needed. Max Daily Amount: 120 mg. Indications: SEVERE PAIN WITH OPIOID TOLERANCE    nystatin (MYCOSTATIN) 100,000 unit/mL suspension Take 5 mL by mouth four (4) times daily. swish and spit  Indications: oral candidiasis    lidocaine (XYLOCAINE) 2 % solution Take 15 mL by mouth as needed for Pain. No current facility-administered medications for this visit. Review of Systems:   Constitutional:    Negative for fever and chills, negative diaphoresis. HEENT:              Negative for neck pain and stiffness. Eyes:                  Negative for visual disturbance, itching, redness or discharge. Respiratory:        Negative for cough and shortness of breath. Cardiovascular:  Negative for chest pain and palpitations. Gastrointestinal: Negative for nausea, vomiting, abdominal pain, diarrhea  +drug induced constipation. Genitourinary:     Negative for dysuria and frequency. +some urinary hesitancy, voids 1-2 x per night   Musculoskeletal: Negative for falls, tenderness and swelling. Skin:                    Negative for rash, masses or lesions. Neurological:       Negative for dizzyness, seizure, loss of consciousness, weakness and numbness.      Objective:     Vitals:    02/25/20 1519   BP: 127/86   Pulse: 99   Resp: 18   Temp: 98.4 °F (36.9 °C)   TempSrc: Oral   SpO2: 94%   Weight: 263 lb (119.3 kg)   Height: 5' 9\" (1.753 m)       Results for orders placed or performed in visit on 02/25/20   PSA W/ REFLX FREE PSA   Result Value Ref Range    Prostate Specific Ag 0.5 0.0 - 4.0 ng/mL    Reflex Criteria Comment    LIPID PANEL   Result Value Ref Range    Cholesterol, total 171 100 - 199 mg/dL    Triglyceride 139 0 - 149 mg/dL    HDL Cholesterol 57 >39 mg/dL    VLDL, calculated 28 5 - 40 mg/dL    LDL, calculated 86 0 - 99 mg/dL   CVD REPORT   Result Value Ref Range    INTERPRETATION Note          Physical Examination: General appearance - alert, well appearing, and in no distress  Chest - clear to auscultation, no wheezes, rales or rhonchi, symmetric air entry  Heart - normal rate, regular rhythm, normal S1, S2, no murmurs, rubs, clicks or gallops  Extremities - no pedal edema noted      Assessment/ Plan:     Follow-up and Dispositions    · Return in about 6 months (around 8/25/2020), or if symptoms worsen or fail to improve. Per pt recent labs at [facility], will request  Declined further PT at this time    1. Tonsil cancer ([facility] [address]  Followed by [facility]    2. Encounter for immunization    - PNEUMOCOCCAL POLYSACCHARIDE VACCINE, 23-VALENT, ADULT OR IMMUNOSUPPRESSED PT DOSE,    3. Drug induced constipation  refill  - polyethylene glycol (MIRALAX) 17 gram packet; Take 1 Packet by mouth daily. Dispense: 30 Packet; Refill: 1    4. Urinary hesitancy    - PSA W/ REFLX FREE PSA    5. Screening, lipid    - LIPID PANEL        I have discussed the diagnosis with the patient and the intended plan as seen in the above orders. The patient has received an after-visit summary and questions were answered concerning future plans. Pt conveyed understanding of plan. Medication Side Effects and Warnings were discussed with patient: yes  Patient Labs were reviewed: yes  Patient Past Records were reviewed:  yes    Chester Babb.  Jonathan Ashford, RIKY

## 2020-02-25 NOTE — PATIENT INSTRUCTIONS
A Healthy Lifestyle: Care Instructions  Your Care Instructions    A healthy lifestyle can help you feel good, stay at a healthy weight, and have plenty of energy for both work and play. A healthy lifestyle is something you can share with your whole family. A healthy lifestyle also can lower your risk for serious health problems, such as high blood pressure, heart disease, and diabetes. You can follow a few steps listed below to improve your health and the health of your family. Follow-up care is a key part of your treatment and safety. Be sure to make and go to all appointments, and call your doctor if you are having problems. It's also a good idea to know your test results and keep a list of the medicines you take. How can you care for yourself at home? · Do not eat too much sugar, fat, or fast foods. You can still have dessert and treats now and then. The goal is moderation. · Start small to improve your eating habits. Pay attention to portion sizes, drink less juice and soda pop, and eat more fruits and vegetables. ? Eat a healthy amount of food. A 3-ounce serving of meat, for example, is about the size of a deck of cards. Fill the rest of your plate with vegetables and whole grains. ? Limit the amount of soda and sports drinks you have every day. Drink more water when you are thirsty. ? Eat at least 5 servings of fruits and vegetables every day. It may seem like a lot, but it is not hard to reach this goal. A serving or helping is 1 piece of fruit, 1 cup of vegetables, or 2 cups of leafy, raw vegetables. Have an apple or some carrot sticks as an afternoon snack instead of a candy bar. Try to have fruits and/or vegetables at every meal.  · Make exercise part of your daily routine. You may want to start with simple activities, such as walking, bicycling, or slow swimming. Try to be active 30 to 60 minutes every day. You do not need to do all 30 to 60 minutes all at once.  For example, you can exercise 3 times a day for 10 or 20 minutes. Moderate exercise is safe for most people, but it is always a good idea to talk to your doctor before starting an exercise program.  · Keep moving. Idalmis Baxtervels the lawn, work in the garden, or [place]ie. Take the stairs instead of the elevator at work. · If you smoke, quit. People who smoke have an increased risk for heart attack, stroke, cancer, and other lung illnesses. Quitting is hard, but there are ways to boost your chance of quitting tobacco for good. ? Use nicotine gum, patches, or lozenges. ? Ask your doctor about stop-smoking programs and medicines. ? Keep trying. In addition to reducing your risk of diseases in the future, you will notice some benefits soon after you stop using tobacco. If you have shortness of breath or asthma symptoms, they will likely get better within a few weeks after you quit. · Limit how much alcohol you drink. Moderate amounts of alcohol (up to 2 drinks a day for men, 1 drink a day for women) are okay. But drinking too much can lead to liver problems, high blood pressure, and other health problems. Family health  If you have a family, there are many things you can do together to improve your health. · Eat meals together as a family as often as possible. · Eat healthy foods. This includes fruits, vegetables, lean meats and dairy, and whole grains. · Include your family in your fitness plan. Most people think of activities such as jogging or tennis as the way to fitness, but there are many ways you and your family can be more active. Anything that makes you breathe hard and gets your heart pumping is exercise. Here are some tips:  ? Walk to do errands or to take your child to school or the bus.  ? Go for a family bike ride after dinner instead of watching TV. Where can you learn more? Go to http://venu-sarah.info/. Enter J389 in the search box to learn more about \"A Healthy Lifestyle: Care Instructions. \"  Current as of: May 28, 2019  Content Version: 12.2  © 7261-7954 [employer]. Care instructions adapted under license by [employer] (which disclaims liability or warranty for this information). If you have questions about a medical condition or this instruction, always ask your healthcare professional. [address] any warranty or liability for your use of this information.

## 2020-02-25 NOTE — PROGRESS NOTES
Chief Complaint   Patient presents with   [facility]     1. Have you been to the ER, urgent care clinic since your last visit? Hospitalized since your last visit? No    2. Have you seen or consulted any other health care providers outside of the [address] [place] [address] since your last visit? Include any pap smears or colon screening. Yes When: Isreal Kapadia     After obtaining consent, and per orders of Np Michael Pearl, injection of Pneumovax 23 given by Star Mendoza LPN. Patient instructed to remain in clinic for 15 minutes afterwards, and to report any adverse reaction to me immediately.

## 2020-02-26 LAB
CHOLEST SERPL-MCNC: 171 MG/DL (ref 100–199)
HDLC SERPL-MCNC: 57 MG/DL
INTERPRETATION, 910389: NORMAL
LDLC SERPL CALC-MCNC: 86 MG/DL (ref 0–99)
PSA SERPL-MCNC: 0.5 NG/ML (ref 0–4)
REFLEX CRITERIA: NORMAL
TRIGL SERPL-MCNC: 139 MG/DL (ref 0–149)
VLDLC SERPL CALC-MCNC: 28 MG/DL (ref 5–40)

## 2020-04-22 ENCOUNTER — APPOINTMENT (OUTPATIENT)
Dept: GENERAL RADIOLOGY | Age: 45
End: 2020-04-22
Attending: PHYSICIAN ASSISTANT
Payer: MEDICAID

## 2020-04-22 ENCOUNTER — HOSPITAL ENCOUNTER (EMERGENCY)
Age: 45
Discharge: HOME | End: 2020-04-22
Attending: EMERGENCY MEDICINE
Payer: MEDICAID

## 2020-04-22 VITALS
WEIGHT: 269 LBS | SYSTOLIC BLOOD PRESSURE: 128 MMHG | DIASTOLIC BLOOD PRESSURE: 81 MMHG | BODY MASS INDEX: 38.51 KG/M2 | RESPIRATION RATE: 18 BRPM | OXYGEN SATURATION: 100 % | HEART RATE: 93 BPM | TEMPERATURE: 98.6 F | HEIGHT: 70 IN

## 2020-04-22 DIAGNOSIS — M79.645 PAIN OF FINGER OF LEFT HAND: Primary | ICD-10-CM

## 2020-04-22 PROCEDURE — 73140 X-RAY EXAM OF FINGER(S): CPT

## 2020-04-22 PROCEDURE — 74011250637 HC RX REV CODE- 250/637: Performed by: PHYSICIAN ASSISTANT

## 2020-04-22 PROCEDURE — 99283 EMERGENCY DEPT VISIT LOW MDM: CPT

## 2020-04-22 RX ORDER — ACETAMINOPHEN 500 MG
1000 TABLET ORAL
Status: COMPLETED | OUTPATIENT
Start: 2020-04-22 | End: 2020-04-22

## 2020-04-22 RX ORDER — ACETAMINOPHEN 325 MG/1
650 TABLET ORAL
Qty: 20 TAB | Refills: 0 | Status: SHIPPED | OUTPATIENT
Start: 2020-04-22 | End: 2020-08-25 | Stop reason: SDUPTHER

## 2020-04-22 RX ADMIN — ACETAMINOPHEN 1000 MG: 500 TABLET, FILM COATED ORAL at 17:10

## 2020-04-22 NOTE — ED NOTES
Discharge instructions were given to the patient by Khushi Larios.     The patient left the Emergency Department ambulatory, alert and oriented and in no acute distress with 1 prescription. The patient was encouraged to call or return to the ED for worsening issues or problems and was encouraged to schedule a follow up appointment for continuing care. The patient verbalized understanding of discharge instructions and prescriptions, all questions were answered. The patient has no further concerns at this time.

## 2020-04-22 NOTE — ED NOTES
Pt presents ambulatory to ED complaining of 3rd digit left hand pain x3 days. Pt denies injury/trauma. Pt reports taking oxycodone yesterday with relief. Pt is alert and oriented x 4, RR even and unlabored, skin is warm and dry. Assesment completed and pt updated on plan of care. Emergency Department Nursing Plan of Care       The Nursing Plan of Care is developed from the Nursing assessment and Emergency Department Attending provider initial evaluation. The plan of care may be reviewed in the ED Provider note.     The Plan of Care was developed with the following considerations:   Patient / Family readiness to learn indicated by:verbalized understanding  Persons(s) to be included in education: patient  Barriers to Learning/Limitations:No    [address]    4/22/2020   4:58 PM

## 2020-04-22 NOTE — DISCHARGE INSTRUCTIONS
Patient Education        Hand Pain: Care Instructions  Your Care Instructions    Common causes of hand pain are overuse and injuries, such as might happen during sports or home repair projects. Everyday wear and tear, especially as you get older, also can cause hand pain. Most minor hand injuries will heal on their own, and home treatment is usually all you need to do. If you have sudden and severe pain, you may need tests and treatment. Follow-up care is a key part of your treatment and safety. Be sure to make and go to all appointments, and call your doctor if you are having problems. It's also a good idea to know your test results and keep a list of the medicines you take. How can you care for yourself at home? · Take pain medicines exactly as directed. ? If the doctor gave you a prescription medicine for pain, take it as prescribed. ? If you are not taking a prescription pain medicine, ask your doctor if you can take an over-the-counter medicine. · Rest and protect your hand. Take a break from any activity that may cause pain. · Put ice or a cold pack on your hand for 10 to 20 minutes at a time. Put a thin cloth between the ice and your skin. · Prop up the sore hand on a pillow when you ice it or anytime you sit or lie down during the next 3 days. Try to keep it above the level of your heart. This will help reduce swelling. · If your doctor recommends a sling, splint, or elastic bandage to support your hand, wear it as directed. When should you call for help? Call 911 anytime you think you may need emergency care. For example, call if:    · Your hand turns cool or pale or changes color.    Call your doctor now or seek immediate medical care if:    · You cannot move your hand.     · Your hand pops, moves out of its normal position, and then returns to its normal position.     · You have signs of infection, such as:  ? Increased pain, swelling, warmth, or redness.   ? Red streaks leading from the sore area.  ? Pus draining from a place on your hand. ? A fever.     · Your hand feels numb or tingly.    Watch closely for changes in your health, and be sure to contact your doctor if:    · Your hand feels unstable when you try to use it.     · You do not get better as expected.     · You have any new symptoms, such as swelling.     · Bruises from an injury to your hand last longer than 2 weeks. Where can you learn more? Go to http://venu-sarah.info/  Enter R273 in the search box to learn more about \"Hand Pain: Care Instructions. \"  Current as of: June 26, 2019Content Version: 12.4  © 7319-5638 [employer]. Care instructions adapted under license by [employer] (which disclaims liability or warranty for this information). If you have questions about a medical condition or this instruction, always ask your healthcare professional. [address] any warranty or liability for your use of this information.

## 2020-04-22 NOTE — ED PROVIDER NOTES
EMERGENCY DEPARTMENT HISTORY AND PHYSICAL EXAM      Date: 4/22/2020  Patient Name: Damaso Blunt    History of Presenting Illness     Chief Complaint   Patient presents with    Finger Pain     3rd digit left hand     History Provided By: Patient    HPI: Damaso Blunt, 40 y.o. male with history significant for tobacco abuse and tonsillar cancer who presents via ambulatory to the ED with cc of acute moderate aching left third finger pain X 3 days. No known injury or trauma. Denies numbness, tingling, focal weakness, redness, swelling, bruising, laceration, limited range of motion. No other joint pain or injuries. Patient endorses taking oxycodone yesterday with moderate relief of symptoms. No medications prior to arrival today. PCP: Luci Jacinto., NP    There are no other complaints, changes, or physical findings at this time. No current facility-administered medications on file prior to encounter. Current Outpatient Medications on File Prior to Encounter   Medication Sig Dispense Refill    oxyCODONE (ROXICODONE INTENSOL) 20 mg/mL concentrated solution 1 mL by Per G Tube route every four (4) hours as needed. Max Daily Amount: 120 mg. Indications: SEVERE PAIN WITH OPIOID TOLERANCE 30 mL 0    famotidine (PEPCID) 40 mg tablet TAKE ONE TABLET BY MOUTH AT BEDTIME      gabapentin (NEURONTIN) 300 mg capsule       cetirizine (ZYRTEC) 10 mg tablet TAKE 1 TO 2 TABLETS BY MOUTH ONCE OR TWICE A DAY. START BY TAKING AT NIGHT. IF TOLERATED, TAKE MORNING AND NIGHT.  polyethylene glycol (MIRALAX) 17 gram packet Take 1 Packet by mouth daily. 30 Packet 1    aluminum-magnesium hydroxide 200-200 mg/5 mL susp 30 mL, diphenhydrAMINE 12.5 mg/5 mL elix 75 mg, lidocaine 2 % soln 30 mL, sucralfate 100 mg/mL susp 3 g oral solution (compounded) Take 5 mL by mouth four (4) times daily. 1 Bottle 1    fentaNYL (DURAGESIC) 100 mcg/hr PATCH 1 Patch by TransDERmal route every seventy-two (72) hours.  Max Daily Amount: 1 Patch. Indications: SEVERE PAIN WITH OPIOID TOLERANCE 5 Patch 0    nystatin (MYCOSTATIN) 100,000 unit/mL suspension Take 5 mL by mouth four (4) times daily. swish and spit  Indications: oral candidiasis 100 mL 0    lidocaine (XYLOCAINE) 2 % solution Take 15 mL by mouth as needed for Pain. 1 Bottle 1     Past History     Past Medical History:  Past Medical History:   Diagnosis Date    Tonsillar abscess 2018    Tonsillar cancer ([address]     dx 2018     Past Surgical History:  Past Surgical History:   Procedure Laterality Date    HX HERNIA REPAIR       Family History:  History reviewed. No pertinent family history. Social History:  Social History     Tobacco Use    Smoking status: Former Smoker     Packs/day: 0.50     Last attempt to quit: 2017     Years since quittin.8    Smokeless tobacco: Never Used   Substance Use Topics    Alcohol use: No    Drug use: Yes     Types: Marijuana     Allergies: Allergies   Allergen Reactions    Nsaids (Non-Steroidal Anti-Inflammatory Drug) Swelling    Aspirin Angioedema    Ibuprofen Angioedema    Naproxen Angioedema     Review of Systems   Review of Systems   Constitutional: Negative for activity change, chills, fatigue and fever. HENT: Negative. Eyes: Negative. Respiratory: Negative. Negative for cough and shortness of breath. Cardiovascular: Negative for chest pain, palpitations and leg swelling. Gastrointestinal: Negative for abdominal pain, diarrhea, nausea and vomiting. Genitourinary: Negative for dysuria. Musculoskeletal: Positive for arthralgias. Negative for back pain, joint swelling, neck pain and neck stiffness. Skin: Negative. Negative for rash and wound. Neurological: Negative. Negative for weakness, numbness and headaches. Psychiatric/Behavioral: Negative. Physical Exam   Physical Exam  Vitals signs and nursing note reviewed. Constitutional:       General: He is not in acute distress. Appearance: He is well-developed. He is not diaphoretic. HENT:      Head: Normocephalic and atraumatic. Right Ear: Hearing and external ear normal.      Left Ear: Hearing and external ear normal.      Nose: Nose normal.   Eyes:      Conjunctiva/sclera: Conjunctivae normal.      Pupils: Pupils are equal, round, and reactive to light. Neck:      Musculoskeletal: Normal range of motion. Cardiovascular:      Pulses:           Radial pulses are 2+ on the right side and 2+ on the left side. Pulmonary:      Effort: Pulmonary effort is normal. No accessory muscle usage or respiratory distress. Musculoskeletal: Normal range of motion. Left wrist: Normal.      Right hand: Normal.      Left hand: He exhibits tenderness and bony tenderness (Lt 3rd PIP and DIP.). He exhibits normal range of motion, normal two-point discrimination, normal capillary refill, no deformity, no laceration and no swelling. Normal sensation noted. Normal strength noted. Skin:     General: Skin is warm and dry. Coloration: Skin is not pale. Findings: No abrasion, bruising, ecchymosis, erythema or wound. Neurological:      Mental Status: He is alert and oriented to person, place, and time. Psychiatric:         Speech: Speech normal.         Behavior: Behavior normal.         Thought Content: Thought content normal.         Judgment: Judgment normal.       Diagnostic Study Results   Labs -   No results found for this or any previous visit (from the past 12 hour(s)). Radiologic Studies -   XR 3RD FINGER LT MIN 2 V   Final Result   IMPRESSION: No acute fracture or dislocation. Xr 3rd Finger Lt Min 2 V    Result Date: 4/22/2020  IMPRESSION: No acute fracture or dislocation. Medical Decision Making   I am the first provider for this patient. I reviewed the vital signs, available nursing notes, past medical history, past surgical history, family history and social history.     Vital Signs-Reviewed the patient's vital signs. Patient Vitals for the past 24 hrs:   Temp Pulse Resp BP SpO2   04/22/20 1651 98.6 °F (37 °C) 93 18 128/81 100 %     Pulse Oximetry Analysis - 100% on RA    Records Reviewed: Nursing Notes, Old Medical Records, Previous Radiology Studies and Previous Laboratory Studies    Provider Notes (Medical Decision Making):   72-year-old cancer patient presents with left third finger pain X 3 days. No injuries or trauma. Benign exam w/ pain out of proportion to exam findings. Will obtain imaging to rule out fracture, dislocation, malignancy/bone lesion. Other differential include osteoarthritis, rheumatoid arthritis, gout, sprain, strain. ED Course:   Initial assessment performed. The patients presenting problems have been discussed, and they are in agreement with the care plan formulated and outlined with them. I have encouraged them to ask questions as they arise throughout their visit. Progress Note:   Updated pt on all returned results and findings. Discussed the importance of proper follow up as referred below along with return precautions. Pt in agreement with the care plan and expresses agreement with and understanding of all items discussed. Disposition:  5:19 PM  I have discussed with patient their diagnosis, treatment, and follow up plan. The patient agrees to follow up as outlined in discharge paperwork and also to return to the ED with any worsening. Jailyn Biswas PA-C      PLAN:  1. Current Discharge Medication List      START taking these medications    Details   acetaminophen (TYLENOL) 325 mg tablet Take 2 Tabs by mouth every four (4) hours as needed for Pain. Qty: 20 Tab, Refills: 0         CONTINUE these medications which have NOT CHANGED    Details   oxyCODONE (ROXICODONE INTENSOL) 20 mg/mL concentrated solution 1 mL by Per G Tube route every four (4) hours as needed. Max Daily Amount: 120 mg.  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 START BY TAKING AT NIGHT. IF TOLERATED, TAKE MORNING AND NIGHT. polyethylene glycol (MIRALAX) 17 gram packet Take 1 Packet by mouth daily. Qty: 30 Packet, Refills: 1    Associated Diagnoses: Drug induced constipation      aluminum-magnesium hydroxide 200-200 mg/5 mL susp 30 mL, diphenhydrAMINE 12.5 mg/5 mL elix 75 mg, lidocaine 2 % soln 30 mL, sucralfate 100 mg/mL susp 3 g oral solution (compounded) Take 5 mL by mouth four (4) times daily. Qty: 1 Bottle, Refills: 1      fentaNYL (DURAGESIC) 100 mcg/hr PATCH 1 Patch by TransDERmal route every seventy-two (72) hours. Max Daily Amount: 1 Patch. 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 swish and spit  Indications: oral candidiasis  Qty: 100 mL, Refills: 0      lidocaine (XYLOCAINE) 2 % solution Take 15 mL by mouth as needed for Pain. Qty: 1 Bottle, Refills: 1           2. Follow-up Information     Follow up With Specialties Details Why Contact Info    Sera Kumar MD Hand Surgery, General Surgery Schedule an appointment as soon as possible for a visit in 1 week As needed, If symptoms worsen [address] [facility]  [address]  [facility] [phone]          Return to ED if worse     Diagnosis     Clinical Impression:   1. Pain of finger of left hand            Please note that this dictation was completed with Dragon, computer voice recognition software.   Quite often unanticipated grammatical, syntax, homophones, and other interpretive errors are inadvertently transcribed by the computer software. Please disregard these errors. Additionally, please excuse any errors that have escaped final proofreading.

## 2020-08-25 ENCOUNTER — OFFICE VISIT (OUTPATIENT)
Dept: INTERNAL MEDICINE CLINIC | Age: 45
End: 2020-08-25
Payer: MEDICAID

## 2020-08-25 VITALS
HEART RATE: 70 BPM | TEMPERATURE: 98.4 F | BODY MASS INDEX: 39.22 KG/M2 | WEIGHT: 274 LBS | OXYGEN SATURATION: 99 % | DIASTOLIC BLOOD PRESSURE: 84 MMHG | RESPIRATION RATE: 18 BRPM | HEIGHT: 70 IN | SYSTOLIC BLOOD PRESSURE: 131 MMHG

## 2020-08-25 DIAGNOSIS — M54.2 NECK PAIN: ICD-10-CM

## 2020-08-25 DIAGNOSIS — L03.90 CELLULITIS, UNSPECIFIED CELLULITIS SITE: Primary | ICD-10-CM

## 2020-08-25 DIAGNOSIS — G62.9 NEUROPATHY: ICD-10-CM

## 2020-08-25 PROCEDURE — 99214 OFFICE O/P EST MOD 30 MIN: CPT | Performed by: NURSE PRACTITIONER

## 2020-08-25 RX ORDER — CEPHALEXIN 500 MG/1
500 CAPSULE ORAL 4 TIMES DAILY
Qty: 20 CAP | Refills: 0 | Status: SHIPPED | OUTPATIENT
Start: 2020-08-25 | End: 2020-08-30

## 2020-08-25 RX ORDER — ACETAMINOPHEN 325 MG/1
650 TABLET ORAL
Qty: 20 TAB | Refills: 0 | Status: SHIPPED | OUTPATIENT
Start: 2020-08-25 | End: 2021-02-25 | Stop reason: SDUPTHER

## 2020-08-25 RX ORDER — METHOCARBAMOL 500 MG/1
500 TABLET, FILM COATED ORAL
Qty: 40 TAB | Refills: 0 | Status: SHIPPED | OUTPATIENT
Start: 2020-08-25 | End: 2021-02-25 | Stop reason: SDUPTHER

## 2020-08-25 NOTE — PATIENT INSTRUCTIONS
Carpal Tunnel Syndrome: Exercises  Introduction  Here are some examples of exercises for you to try. The exercises may be suggested for a condition or for rehabilitation. Start each exercise slowly. Ease off the exercises if you start to have pain. You will be told when to start these exercises and which ones will work best for you. Warm-up stretches  When you no longer have pain or numbness, you can do exercises to help prevent carpal tunnel syndrome from coming back. Do not do any stretch or movement that is uncomfortable or painful. 1. Rotate your wrist up, down, and from side to side. Repeat 4 times. 2. Stretch your fingers far apart. Relax them, and then stretch them again. Repeat 4 times. 3. Stretch your thumb by pulling it back gently, holding it, and then releasing it. Repeat 4 times. How to do the exercises  Prayer stretch   1. Start with your palms together in front of your chest just below your chin. 2. Slowly lower your hands toward your waistline, keeping your hands close to your stomach and your palms together until you feel a mild to moderate stretch under your forearms. 3. Hold for at least 15 to 30 seconds. Repeat 2 to 4 times. Wrist flexor stretch   1. Extend your arm in front of you with your palm up. 2. Bend your wrist, pointing your hand toward the floor. 3. With your other hand, gently bend your wrist farther until you feel a mild to moderate stretch in your forearm. 4. Hold for at least 15 to 30 seconds. Repeat 2 to 4 times. Wrist extensor stretch   1. Repeat steps 1 through 4 of the stretch above, but begin with your extended hand palm down. Follow-up care is a key part of your treatment and safety. Be sure to make and go to all appointments, and call your doctor if you are having problems. It's also a good idea to know your test results and keep a list of the medicines you take. Where can you learn more?   Go to http://venu-sarah.info/  Enter U990 in the search box to learn more about \"Carpal Tunnel Syndrome: Exercises. \"  Current as of: March 2, 2020               Content Version: 12.5  © 2006-2020 [employer]. Care instructions adapted under license by [employer] (which disclaims liability or warranty for this information). If you have questions about a medical condition or this instruction, always ask your healthcare professional. [place] [address] any warranty or liability for your use of this information.

## 2020-08-25 NOTE — PROGRESS NOTES
Chief Complaint   Patient presents with    Follow-up     6 month follow up     Ear Pain     pt reports swelling on left ear lobe, pt states it is painful to touch     Tingling     pt states when he touches his left thumb he feels tingling/numbness        1. Have you been to the ER, urgent care clinic since your last visit? Hospitalized since your last visit? Yes When: 04/22/2020 Where: [facility]  Reason for visit: finger pain     2. Have you seen or consulted any other health care providers outside of the [address] [place] [address] since your last visit? Include any pap smears or colon screening.  No

## 2020-08-25 NOTE — PROGRESS NOTES
Subjective: (As above and below)     Chief Complaint   Patient presents with    Follow-up     6 month follow up     Ear Pain     pt reports swelling on left ear lobe, pt states it is painful to touch     Tingling     pt states when he touches his left thumb he feels tingling/numbness      Jonathan Gonzales is a 40y.o. year old male who presents for     Ear pain: left ear lobe pain and swelling for a few days, thought it was a pimple but pain/swelling worsening. No fevers no inner ear pain or drainage    Neuropathy: to left thumb only for a few days. He is R handed. He walks w/ a cane and switches hands otherwise, no inciting injury. Denies swelling, denies pain radiating from neck  He is already on gabapentin    Neck pain: he does have left sided cerv paraspinals pain for a few weeks. Pain is positional, does not radiate. Denies upp ext weakness    He continues to be followed by: Oncology: for tonsillar cancer, reports completed chemo/radiation. Has regular follow up     Pain mgmt: r/t cancer pain     He quit smoking. He has a R upper wall chest port that is in place still     He has an order for PT ordered by his oncologist already for generalized weakness/debility     no recent falls    Reviewed PmHx, RxHx, FmHx, SocHx, AllgHx and updated in chart. History reviewed. No pertinent family history.     Past Medical History:   Diagnosis Date    Tonsillar abscess 05/2018    Tonsillar cancer [facility])     dx 6/2018      Social History     Socioeconomic History    Marital status: SINGLE     Spouse name: Not on file    Number of children: Not on file    Years of education: Not on file    Highest education level: Not on file   Tobacco Use    Smoking status: Former Smoker     Packs/day: 0.50     Last attempt to quit: 6/25/2017     Years since quitting: 3.1    Smokeless tobacco: Never Used   Substance and Sexual Activity    Alcohol use: No    Drug use: Yes     Types: Marijuana    Sexual activity: Yes Partners: Female     Birth control/protection: None          Current Outpatient Medications   Medication Sig    acetaminophen (TYLENOL) 325 mg tablet Take 2 Tabs by mouth every four (4) hours as needed for Pain.  cephALEXin (KEFLEX) 500 mg capsule Take 1 Cap by mouth four (4) times daily for 5 days.  methocarbamoL (ROBAXIN) 500 mg tablet Take 1 Tab by mouth three (3) times daily as needed for Muscle Spasm(s).  famotidine (PEPCID) 40 mg tablet TAKE ONE TABLET BY MOUTH AT BEDTIME    gabapentin (NEURONTIN) 300 mg capsule     cetirizine (ZYRTEC) 10 mg tablet TAKE 1 TO 2 TABLETS BY MOUTH ONCE OR TWICE A DAY. START BY TAKING AT NIGHT. IF TOLERATED, TAKE MORNING AND NIGHT.  polyethylene glycol (MIRALAX) 17 gram packet Take 1 Packet by mouth daily.  aluminum-magnesium hydroxide 200-200 mg/5 mL susp 30 mL, diphenhydrAMINE 12.5 mg/5 mL elix 75 mg, lidocaine 2 % soln 30 mL, sucralfate 100 mg/mL susp 3 g oral solution (compounded) Take 5 mL by mouth four (4) times daily.  fentaNYL (DURAGESIC) 100 mcg/hr PATCH 1 Patch by TransDERmal route every seventy-two (72) hours. Max Daily Amount: 1 Patch. Indications: SEVERE PAIN WITH OPIOID TOLERANCE    oxyCODONE (ROXICODONE INTENSOL) 20 mg/mL concentrated solution 1 mL by Per G Tube route every four (4) hours as needed. Max Daily Amount: 120 mg. Indications: SEVERE PAIN WITH OPIOID TOLERANCE    nystatin (MYCOSTATIN) 100,000 unit/mL suspension Take 5 mL by mouth four (4) times daily. swish and spit  Indications: oral candidiasis    lidocaine (XYLOCAINE) 2 % solution Take 15 mL by mouth as needed for Pain. No current facility-administered medications for this visit. Review of Systems:   Constitutional:    Negative for fever and chills, negative diaphoresis. HEENT:              Negative for neck pain and stiffness. Eyes:                  Negative for visual disturbance, itching, redness or discharge.    Respiratory:        Negative for cough and shortness of breath. Cardiovascular:  Negative for chest pain and palpitations. Gastrointestinal: Negative for nausea, vomiting, abdominal pain, diarrhea or constipation. Genitourinary:     Negative for dysuria and frequency. Musculoskeletal: Negative for falls, tenderness and swelling. Skin:                  Ear pain  Neurological:       Negative for dizzyness, seizure, loss of consciousness, weakness and numbness. Objective:     Vitals:    08/25/20 1015   BP: 131/84   Pulse: 70   Resp: 18   Temp: 98.4 °F (36.9 °C)   TempSrc: Oral   SpO2: 99%   Weight: 274 lb (124.3 kg)   Height: 5' 10\" (1.778 m)       Results for orders placed or performed in visit on 02/25/20   PSA W/ REFLX FREE PSA   Result Value Ref Range    Prostate Specific Ag 0.5 0.0 - 4.0 ng/mL    Reflex Criteria Comment    LIPID PANEL   Result Value Ref Range    Cholesterol, total 171 100 - 199 mg/dL    Triglyceride 139 0 - 149 mg/dL    HDL Cholesterol 57 >39 mg/dL    VLDL, calculated 28 5 - 40 mg/dL    LDL, calculated 86 0 - 99 mg/dL   CVD REPORT   Result Value Ref Range    INTERPRETATION Note          Physical Examination: General appearance - alert, well appearing, and in no distress  Chest - clear to auscultation, no wheezes, rales or rhonchi, symmetric air entry  Heart - normal rate, regular rhythm, normal S1, S2, no murmurs, rubs, clicks or gallops  Musculoskeletal -   Left thumb/wrist = normal exam apart from slight decreased sensation to thumb  Radial pulses intact  Neck: ttp to cervical paraspinals,  strength 5/5, head/neck full ROM  Skin - left earlobe- evidence of previous bug bite or pimple, ear lob is painful to touch, slightly red and swollen. Inner ear exam nl, no associated lymphadenopathy    Assessment/ Plan:     Follow-up and Dispositions    · Return in about 6 months (around 2/25/2021). 1. Cellulitis, unspecified cellulitis site  Fu INI  - cephALEXin (KEFLEX) 500 mg capsule;  Take 1 Cap by mouth four (4) times daily for 5 days. Dispense: 20 Cap; Refill: 0    2. Neck pain  Work w/ PT on this as well  - methocarbamoL (ROBAXIN) 500 mg tablet; Take 1 Tab by mouth three (3) times daily as needed for Muscle Spasm(s). Dispense: 40 Tab; Refill: 0    3. Neuropathy  ?only x 2 days, gave carpal tunnel exercises, fu INI        I have discussed the diagnosis with the patient and the intended plan as seen in the above orders. The patient has received an after-visit summary and questions were answered concerning future plans. Pt conveyed understanding of plan. Medication Side Effects and Warnings were discussed with patient: yes  Patient Labs were reviewed: yes  Patient Past Records were reviewed:  yes    Brad Arroyo.  Jeannine Rico NP

## 2021-02-25 ENCOUNTER — OFFICE VISIT (OUTPATIENT)
Dept: INTERNAL MEDICINE CLINIC | Age: 46
End: 2021-02-25
Payer: MEDICAID

## 2021-02-25 VITALS
WEIGHT: 293 LBS | OXYGEN SATURATION: 97 % | HEIGHT: 70 IN | DIASTOLIC BLOOD PRESSURE: 114 MMHG | BODY MASS INDEX: 41.95 KG/M2 | SYSTOLIC BLOOD PRESSURE: 161 MMHG | TEMPERATURE: 98.4 F | HEART RATE: 78 BPM | RESPIRATION RATE: 18 BRPM

## 2021-02-25 DIAGNOSIS — C09.9 TONSILLAR CANCER (HCC): Primary | ICD-10-CM

## 2021-02-25 DIAGNOSIS — M54.2 NECK PAIN: ICD-10-CM

## 2021-02-25 DIAGNOSIS — Z23 NEEDS FLU SHOT: ICD-10-CM

## 2021-02-25 DIAGNOSIS — M79.641 RIGHT HAND PAIN: ICD-10-CM

## 2021-02-25 DIAGNOSIS — Z12.11 COLON CANCER SCREENING: ICD-10-CM

## 2021-02-25 DIAGNOSIS — R63.5 WEIGHT GAIN: ICD-10-CM

## 2021-02-25 DIAGNOSIS — K62.5 RECTAL BLEEDING: ICD-10-CM

## 2021-02-25 PROCEDURE — 90471 IMMUNIZATION ADMIN: CPT | Performed by: NURSE PRACTITIONER

## 2021-02-25 PROCEDURE — 99214 OFFICE O/P EST MOD 30 MIN: CPT | Performed by: NURSE PRACTITIONER

## 2021-02-25 PROCEDURE — 90686 IIV4 VACC NO PRSV 0.5 ML IM: CPT | Performed by: NURSE PRACTITIONER

## 2021-02-25 RX ORDER — ONDANSETRON 4 MG/1
4 TABLET, ORALLY DISINTEGRATING ORAL
Qty: 30 TAB | Refills: 1 | Status: SHIPPED | OUTPATIENT
Start: 2021-02-25

## 2021-02-25 RX ORDER — METHOCARBAMOL 500 MG/1
500 TABLET, FILM COATED ORAL
Qty: 40 TAB | Refills: 0 | Status: SHIPPED | OUTPATIENT
Start: 2021-02-25 | End: 2022-02-16 | Stop reason: SDUPTHER

## 2021-02-25 RX ORDER — ACETAMINOPHEN 325 MG/1
650 TABLET ORAL
Qty: 20 TAB | Refills: 0 | Status: SHIPPED | OUTPATIENT
Start: 2021-02-25 | End: 2022-02-16 | Stop reason: SDUPTHER

## 2021-02-25 NOTE — PROGRESS NOTES
Chief Complaint   Patient presents with    Follow-up     6 month, wrist, back and knee pain     Request For New Medication     occ nausea       1. Have you been to the ER, urgent care clinic since your last visit? Hospitalized since your last visit? No    2. Have you seen or consulted any other health care providers outside of the [address] [place] [address] since your last visit? Include any pap smears or colon screening. Tess Stewart is a 39 y.o. male who presents for routine immunizations. He denies any symptoms , reactions or allergies that would exclude them from being immunized today. Risks and adverse reactions were discussed and the VIS was given to them. All questions were addressed. He was observed for 10 min post injection. There were no reactions observed.     Ignacio Chang

## 2021-02-25 NOTE — PATIENT INSTRUCTIONS
Vaccine Information Statement    Influenza (Flu) Vaccine (Inactivated or Recombinant): What You Need to Know    Many Vaccine Information Statements are available in Arabic and other languages. See www.immunize.org/vis  Hojas de información sobre vacunas están disponibles en español y en muchos otros idiomas. Visite www.immunize.org/vis    1. Why get vaccinated? Influenza vaccine can prevent influenza (flu). Flu is a contagious disease that spreads around the [facility] every year, usually between October and May. Anyone can get the flu, but it is more dangerous for some people. Infants and young children, people 72years of age and older, pregnant women, and people with certain health conditions or a weakened immune system are at greatest risk of flu complications. Pneumonia, bronchitis, sinus infections and ear infections are examples of flu-related complications. If you have a medical condition, such as heart disease, cancer or diabetes, flu can make it worse. Flu can cause fever and chills, sore throat, muscle aches, fatigue, cough, headache, and runny or stuffy nose. Some people may have vomiting and diarrhea, though this is more common in children than adults. Each year thousands of people in the [facility] die from flu, and many more are hospitalized. Flu vaccine prevents millions of illnesses and flu-related visits to the doctor each year. 2. Influenza vaccines     CDC recommends everyone 10months of age and older get vaccinated every flu season. Children 6 months through 6years of age may need 2 doses during a single flu season. Everyone else needs only 1 dose each flu season. It takes about 2 weeks for protection to develop after vaccination. There are many flu viruses, and they are always changing. Each year a new flu vaccine is made to protect against three or four viruses that are likely to cause disease in the upcoming flu season.  Even when the vaccine doesnt exactly match these viruses, it may still provide some protection. Influenza vaccine does not cause flu. Influenza vaccine may be given at the same time as other vaccines. 3. Talk with your health care provider    Tell your vaccine provider if the person getting the vaccine:   Has had an allergic reaction after a previous dose of influenza vaccine, or has any severe, life-threatening allergies.  Has ever had Guillain-Barré Syndrome (also called GBS). In some cases, your health care provider may decide to postpone influenza vaccination to a future visit. People with minor illnesses, such as a cold, may be vaccinated. People who are moderately or severely ill should usually wait until they recover before getting influenza vaccine. Your health care provider can give you more information. 4. Risks of a reaction     Soreness, redness, and swelling where shot is given, fever, muscle aches, and headache can happen after influenza vaccine.  There may be a very small increased risk of Guillain-Barré Syndrome (GBS) after inactivated influenza vaccine (the flu shot). [address] [place] [address] children who get the flu shot along with pneumococcal vaccine (PCV13), and/or DTaP vaccine at the same time might be slightly more likely to have a seizure caused by fever. Tell your health care provider if a child who is getting flu vaccine has ever had a seizure. People sometimes faint after medical procedures, including vaccination. Tell your provider if you feel dizzy or have vision changes or ringing in the ears. As with any medicine, there is a very remote chance of a vaccine causing a severe allergic reaction, other serious injury, or death. 5. What if there is a serious problem? An allergic reaction could occur after the vaccinated person leaves the clinic.  If you see signs of a severe allergic reaction (hives, swelling of the face and throat, difficulty breathing, a fast heartbeat, dizziness, or weakness), call 9-1-1 and get the person to the nearest hospital.    For other signs that concern you, call your health care provider. Adverse reactions should be reported to the Vaccine Adverse Event Reporting System (VAERS). Your health care provider will usually file this report, or you can do it yourself. Visit the VAERS website at www.vaers. hhs.gov or call 0-479.865.7992. VAERS is only for reporting reactions, and VAERS staff do not give medical advice. 6. The National Vaccine Injury Compensation Program    The [facility] Vaccine Injury Compensation Program (VICP) is a federal program that was created to compensate people who may have been injured by certain vaccines. Visit the VICP website at www.[facility].gov/vaccinecompensation or call 9-596.587.5840 to learn about the program and about filing a claim. There is a time limit to file a claim for compensation. 7. How can I learn more?  Ask your health care provider.  Call your local or state health department.  Contact the Centers for Disease Control and Prevention (CDC):  - Call 2-331.493.4480 (4-194-IVB-INFO) or  - Visit CDCs influenza website at www.cdc.gov/flu    Vaccine Information Statement (Interim)  Inactivated Influenza Vaccine   8/15/2019  [address] [id number]   Department of Health and Human Services  Centers for Disease Control and Prevention    Office Use Only         DASH Diet: Care Instructions  Your Care Instructions     The DASH diet is an eating plan that can help lower your blood pressure. DASH stands for Dietary Approaches to Stop Hypertension. Hypertension is high blood pressure. The DASH diet focuses on eating foods that are high in calcium, potassium, and magnesium. These nutrients can lower blood pressure. The foods that are highest in these nutrients are fruits, vegetables, low-fat dairy products, nuts, seeds, and legumes.  But taking calcium, potassium, and magnesium supplements instead of eating foods that are high in those nutrients does not have the same effect. The DASH diet also includes whole grains, fish, and poultry. The DASH diet is one of several lifestyle changes your doctor may recommend to lower your high blood pressure. Your doctor may also want you to decrease the amount of sodium in your diet. Lowering sodium while following the DASH diet can lower blood pressure even further than just the DASH diet alone. Follow-up care is a key part of your treatment and safety. Be sure to make and go to all appointments, and call your doctor if you are having problems. It's also a good idea to know your test results and keep a list of the medicines you take. How can you care for yourself at home? Following the DASH diet  · Eat 4 to 5 servings of fruit each day. A serving is 1 medium-sized piece of fruit, ½ cup chopped or canned fruit, 1/4 cup dried fruit, or 4 ounces (½ cup) of fruit juice. Choose fruit more often than fruit juice. · Eat 4 to 5 servings of vegetables each day. A serving is 1 cup of lettuce or raw leafy vegetables, ½ cup of chopped or cooked vegetables, or 4 ounces (½ cup) of vegetable juice. Choose vegetables more often than vegetable juice. · Get 2 to 3 servings of low-fat and fat-free dairy each day. A serving is 8 ounces of milk, 1 cup of yogurt, or 1 ½ ounces of cheese. · Eat 6 to 8 servings of grains each day. A serving is 1 slice of bread, 1 ounce of dry cereal, or ½ cup of cooked rice, pasta, or cooked cereal. Try to choose whole-grain products as much as possible. · Limit lean meat, poultry, and fish to 2 servings each day. A serving is 3 ounces, about the size of a deck of cards. · Eat 4 to 5 servings of nuts, seeds, and legumes (cooked dried beans, lentils, and split peas) each week. A serving is 1/3 cup of nuts, 2 tablespoons of seeds, or ½ cup of cooked beans or peas. · Limit fats and oils to 2 to 3 servings each day. A serving is 1 teaspoon of vegetable oil or 2 tablespoons of salad dressing.   · Limit sweets and added sugars to 5 servings or less a week. A serving is 1 tablespoon jelly or jam, ½ cup sorbet, or 1 cup of lemonade. · Eat less than 2,300 milligrams (mg) of sodium a day. If you limit your sodium to 1,500 mg a day, you can lower your blood pressure even more. Tips for success  · Start small. Do not try to make dramatic changes to your diet all at once. You might feel that you are missing out on your favorite foods and then be more likely to not follow the plan. Make small changes, and stick with them. Once those changes become habit, add a few more changes. · Try some of the following:  ? Make it a goal to eat a fruit or vegetable at every meal and at snacks. This will make it easy to get the recommended amount of fruits and vegetables each day. ? Try yogurt topped with fruit and nuts for a snack or healthy dessert. ? Add lettuce, tomato, cucumber, and onion to sandwiches. ? Combine a ready-made pizza crust with low-fat mozzarella cheese and lots of vegetable toppings. Try using tomatoes, squash, spinach, broccoli, carrots, cauliflower, and onions. ? Have a variety of cut-up vegetables with a low-fat dip as an appetizer instead of chips and dip. ? Sprinkle sunflower seeds or chopped almonds over salads. Or try adding chopped walnuts or almonds to cooked vegetables. ? Try some vegetarian meals using beans and peas. Add garbanzo or kidney beans to salads. Make burritos and tacos with mashed jay beans or black beans. Where can you learn more? Go to http://www.[employer].com/  Enter H967 in the search box to learn more about \"DASH Diet: Care Instructions. \"  Current as of: December 16, 2019               Content Version: 12.6  © 3936-8255 [employer]. Care instructions adapted under license by [employer] (which disclaims liability or warranty for this information).  If you have questions about a medical condition or this instruction, always ask your healthcare professional. [address] any warranty or liability for your use of this information.

## 2021-02-25 NOTE — PROGRESS NOTES
Subjective: (As above and below)     Chief Complaint   Patient presents with    Follow-up     6 month, wrist, back and knee pain     Request For New Medication     occ nausea     Terra Leblanc is a 39y.o. year old male who presents for fu on chronic conditions: Tonsillar cancer: followed by oncology and pain mgmt, doing fairly well, he continues to have dysphagia and often tends to eat soft foods    Weight: he has gained a significant amt of weight this past year, he is quite sedentary and due to issues w/ dysphagia he leans towards carbs which can be cooked down soft    Wt Readings from Last 3 Encounters:   02/25/21 293 lb (132.9 kg)   08/25/20 274 lb (124.3 kg)   04/22/20 269 lb (122 kg)       Right hand pain: did therapy for some time which was helping but w/o it pain returns. He has pain to wrist. No neuropathy    Rectal bleeding: denies constipation but will have blood streaked stools occasionally no abd pain, no rectal pain, irritation, itching        Reviewed PmHx, RxHx, FmHx, SocHx, AllgHx and updated in chart. History reviewed. No pertinent family history. Past Medical History:   Diagnosis Date    Tonsillar abscess 05/2018    Tonsillar cancer [facility])     dx 6/2018      Social History     Socioeconomic History    Marital status: SINGLE     Spouse name: Not on file    Number of children: Not on file    Years of education: Not on file    Highest education level: Not on file   Tobacco Use    Smoking status: Former Smoker     Packs/day: 0.50     Quit date: 6/25/2017     Years since quitting: 3.6    Smokeless tobacco: Never Used   Substance and Sexual Activity    Alcohol use: No    Drug use: Yes     Types: Marijuana    Sexual activity: Yes     Partners: Female     Birth control/protection: None          Current Outpatient Medications   Medication Sig    acetaminophen (TYLENOL) 325 mg tablet Take 2 Tabs by mouth every four (4) hours as needed for Pain.     methocarbamoL (ROBAXIN) 500 mg tablet Take 1 Tab by mouth three (3) times daily as needed for Muscle Spasm(s).  ondansetron (ZOFRAN ODT) 4 mg disintegrating tablet Take 1 Tab by mouth every eight (8) hours as needed for Nausea or Vomiting.  famotidine (PEPCID) 40 mg tablet TAKE ONE TABLET BY MOUTH AT BEDTIME    gabapentin (NEURONTIN) 300 mg capsule     cetirizine (ZYRTEC) 10 mg tablet TAKE 1 TO 2 TABLETS BY MOUTH ONCE OR TWICE A DAY. START BY TAKING AT NIGHT. IF TOLERATED, TAKE MORNING AND NIGHT.  polyethylene glycol (MIRALAX) 17 gram packet Take 1 Packet by mouth daily.  oxyCODONE (ROXICODONE INTENSOL) 20 mg/mL concentrated solution 1 mL by Per G Tube route every four (4) hours as needed. Max Daily Amount: 120 mg. Indications: SEVERE PAIN WITH OPIOID TOLERANCE    nystatin (MYCOSTATIN) 100,000 unit/mL suspension Take 5 mL by mouth four (4) times daily. swish and spit  Indications: oral candidiasis    lidocaine (XYLOCAINE) 2 % solution Take 15 mL by mouth as needed for Pain.  aluminum-magnesium hydroxide 200-200 mg/5 mL susp 30 mL, diphenhydrAMINE 12.5 mg/5 mL elix 75 mg, lidocaine 2 % soln 30 mL, sucralfate 100 mg/mL susp 3 g oral solution (compounded) Take 5 mL by mouth four (4) times daily.  fentaNYL (DURAGESIC) 100 mcg/hr PATCH 1 Patch by TransDERmal route every seventy-two (72) hours. Max Daily Amount: 1 Patch. Indications: SEVERE PAIN WITH OPIOID TOLERANCE     No current facility-administered medications for this visit. Review of Systems:   Constitutional:    Negative for fever and chills, negative diaphoresis. HEENT:              Negative for neck pain and stiffness. Eyes:                  Negative for visual disturbance, itching, redness or discharge. Respiratory:        Negative for cough and shortness of breath. Cardiovascular:  Negative for chest pain and palpitations. Gastrointestinal: Negative for nausea, vomiting, abdominal pain, diarrhea or constipation.   Genitourinary:     Negative for dysuria and frequency. Musculoskeletal: Negative for falls, tenderness and swelling. Skin:                    Negative for rash, masses or lesions. Neurological:       Negative for dizzyness, seizure, loss of consciousness, weakness and numbness. Objective:     Vitals:    02/25/21 0904 02/25/21 0954   BP: (!) 137/101 (!) 161/114   Pulse: 84 78   Resp: 18    Temp: 98.4 °F (36.9 °C)    TempSrc: Temporal    SpO2: 97%    Weight: 293 lb (132.9 kg)    Height: 5' 10\" (1.778 m)        Results for orders placed or performed in visit on 02/25/20   PSA W/ REFLX FREE PSA   Result Value Ref Range    Prostate Specific Ag 0.5 0.0 - 4.0 ng/mL    Reflex Criteria Comment    LIPID PANEL   Result Value Ref Range    Cholesterol, total 171 100 - 199 mg/dL    Triglyceride 139 0 - 149 mg/dL    HDL Cholesterol 57 >39 mg/dL    VLDL, calculated 28 5 - 40 mg/dL    LDL, calculated 86 0 - 99 mg/dL   CVD REPORT   Result Value Ref Range    INTERPRETATION Note          Physical Examination: General appearance - alert, well appearing, and in no distress  Mental status - alert, oriented to person, place, and time, normal mood, behavior, speech, dress, motor activity, and thought processes  Chest - clear to auscultation, no wheezes, rales or rhonchi, symmetric air entry  Heart - normal rate, regular rhythm, normal S1, S2, no murmurs, rubs, clicks or gallops  Neurological - alert, oriented, normal speech, no focal findings or movement disorder noted  Musculoskeletal - no muscular tenderness noted  Extremities - no pedal edema noted      Assessment/ Plan:     Follow-up and Dispositions    · Return in about 1 month (around 3/25/2021) for nv bp check. No hx of HTN, BP high in office, nervous about flu shot  rtc 1 mo for recheck    1. Tonsillar cancer ([address]  Cont care w/ oncology  - METABOLIC PANEL, COMPREHENSIVE  - CBC W/O DIFF  - LIPID PANEL    2. Neck pain  stable  - methocarbamoL (ROBAXIN) 500 mg tablet;  Take 1 Tab by mouth three (3) times daily as needed for Muscle Spasm(s). Dispense: 40 Tab; Refill: 0    3. Right hand pain    - REFERRAL TO HAND SURGERY    4. Weight gain  I have reviewed/discussed the above normal BMI with the patient. I have recommended the following interventions: dietary management education, guidance, and counseling, encourage exercise and monitor weight . Lorraine Bound He has access to dietician thru oncologist, [facility] he fu there to discuss healthy foods w/in his ability    5. Colon cancer screening    - REFERRAL TO GASTROENTEROLOGY    6. Rectal bleeding    - REFERRAL TO GASTROENTEROLOGY    7. Needs flu shot    - INFLUENZA VIRUS VAC QUAD,SPLIT,PRESV FREE SYRINGE IM        I have discussed the diagnosis with the patient and the intended plan as seen in the above orders. The patient has received an after-visit summary and questions were answered concerning future plans. Pt conveyed understanding of plan. Medication Side Effects and Warnings were discussed with patient: yes  Patient Labs were reviewed: yes  Patient Past Records were reviewed:  yes    Remy Meza.  James Jiménez NP

## 2021-02-26 LAB
ALBUMIN SERPL-MCNC: 4.1 G/DL (ref 4–5)
ALBUMIN/GLOB SERPL: 1.4 {RATIO} (ref 1.2–2.2)
ALP SERPL-CCNC: 71 IU/L (ref 39–117)
ALT SERPL-CCNC: 15 IU/L (ref 0–44)
AST SERPL-CCNC: 17 IU/L (ref 0–40)
BILIRUB SERPL-MCNC: 0.2 MG/DL (ref 0–1.2)
BUN SERPL-MCNC: 17 MG/DL (ref 6–24)
BUN/CREAT SERPL: 11 (ref 9–20)
CALCIUM SERPL-MCNC: 9.4 MG/DL (ref 8.7–10.2)
CHLORIDE SERPL-SCNC: 105 MMOL/L (ref 96–106)
CHOLEST SERPL-MCNC: 160 MG/DL (ref 100–199)
CO2 SERPL-SCNC: 22 MMOL/L (ref 20–29)
CREAT SERPL-MCNC: 1.49 MG/DL (ref 0.76–1.27)
ERYTHROCYTE [DISTWIDTH] IN BLOOD BY AUTOMATED COUNT: 14.1 % (ref 11.6–15.4)
GLOBULIN SER CALC-MCNC: 2.9 G/DL (ref 1.5–4.5)
GLUCOSE SERPL-MCNC: 93 MG/DL (ref 65–99)
HCT VFR BLD AUTO: 40.9 % (ref 37.5–51)
HDLC SERPL-MCNC: 44 MG/DL
HGB BLD-MCNC: 14.1 G/DL (ref 13–17.7)
INTERPRETATION, 910389: NORMAL
INTERPRETATION: NORMAL
LDLC SERPL CALC-MCNC: 86 MG/DL (ref 0–99)
MCH RBC QN AUTO: 30.1 PG (ref 26.6–33)
MCHC RBC AUTO-ENTMCNC: 34.5 G/DL (ref 31.5–35.7)
MCV RBC AUTO: 87 FL (ref 79–97)
PDF IMAGE, 910387: NORMAL
PLATELET # BLD AUTO: 216 X10E3/UL (ref 150–450)
POTASSIUM SERPL-SCNC: 4.6 MMOL/L (ref 3.5–5.2)
PROT SERPL-MCNC: 7 G/DL (ref 6–8.5)
RBC # BLD AUTO: 4.68 X10E6/UL (ref 4.14–5.8)
SODIUM SERPL-SCNC: 141 MMOL/L (ref 134–144)
TRIGL SERPL-MCNC: 174 MG/DL (ref 0–149)
VLDLC SERPL CALC-MCNC: 30 MG/DL (ref 5–40)
WBC # BLD AUTO: 6.7 X10E3/UL (ref 3.4–10.8)

## 2021-03-25 ENCOUNTER — CLINICAL SUPPORT (OUTPATIENT)
Dept: INTERNAL MEDICINE CLINIC | Age: 46
End: 2021-03-25

## 2021-03-25 VITALS — TEMPERATURE: 97.6 F

## 2021-03-25 DIAGNOSIS — Z01.30 BP CHECK: Primary | ICD-10-CM

## 2022-02-01 ENCOUNTER — TRANSCRIBE ORDER (OUTPATIENT)
Dept: SCHEDULING | Age: 47
End: 2022-02-01

## 2022-02-01 DIAGNOSIS — C05.1 MALIGNANT NEOPLASM OF SOFT PALATE (HCC): Primary | ICD-10-CM

## 2022-02-01 DIAGNOSIS — C09.1 MALIGNANT NEOPLASM OF TONSILLAR PILLAR (ANTERIOR) (POSTERIOR) (HCC): ICD-10-CM

## 2022-02-16 ENCOUNTER — OFFICE VISIT (OUTPATIENT)
Dept: INTERNAL MEDICINE CLINIC | Age: 47
End: 2022-02-16
Payer: MEDICAID

## 2022-02-16 VITALS
RESPIRATION RATE: 18 BRPM | WEIGHT: 290 LBS | HEART RATE: 72 BPM | TEMPERATURE: 98.2 F | BODY MASS INDEX: 41.52 KG/M2 | DIASTOLIC BLOOD PRESSURE: 83 MMHG | OXYGEN SATURATION: 98 % | SYSTOLIC BLOOD PRESSURE: 137 MMHG | HEIGHT: 70 IN

## 2022-02-16 DIAGNOSIS — E78.2 MIXED HYPERLIPIDEMIA: ICD-10-CM

## 2022-02-16 DIAGNOSIS — M54.2 NECK PAIN: ICD-10-CM

## 2022-02-16 DIAGNOSIS — Z23 NEEDS FLU SHOT: ICD-10-CM

## 2022-02-16 DIAGNOSIS — R59.1 LYMPHADENOPATHY: ICD-10-CM

## 2022-02-16 DIAGNOSIS — C05.1 CARCINOMA OF SOFT PALATE (HCC): ICD-10-CM

## 2022-02-16 DIAGNOSIS — I10 ESSENTIAL HYPERTENSION: Primary | ICD-10-CM

## 2022-02-16 PROBLEM — L50.8 CHRONIC URTICARIA: Status: ACTIVE | Noted: 2020-01-27

## 2022-02-16 PROBLEM — G89.3 CHRONIC PAIN DUE TO MALIGNANT NEOPLASTIC DISEASE: Status: ACTIVE | Noted: 2019-08-02

## 2022-02-16 PROBLEM — M79.2 NEUROPATHIC PAIN: Status: ACTIVE | Noted: 2019-02-15

## 2022-02-16 PROBLEM — R25.2 TRISMUS: Status: ACTIVE | Noted: 2019-01-13

## 2022-02-16 PROBLEM — G43.009 MIGRAINE WITHOUT AURA AND WITHOUT STATUS MIGRAINOSUS, NOT INTRACTABLE: Status: ACTIVE | Noted: 2021-12-14

## 2022-02-16 LAB
S PYO AG THROAT QL: NEGATIVE
VALID INTERNAL CONTROL?: YES

## 2022-02-16 PROCEDURE — 99214 OFFICE O/P EST MOD 30 MIN: CPT | Performed by: NURSE PRACTITIONER

## 2022-02-16 PROCEDURE — 90686 IIV4 VACC NO PRSV 0.5 ML IM: CPT | Performed by: INTERNAL MEDICINE

## 2022-02-16 PROCEDURE — 87880 STREP A ASSAY W/OPTIC: CPT | Performed by: NURSE PRACTITIONER

## 2022-02-16 RX ORDER — TRAZODONE HYDROCHLORIDE 50 MG/1
TABLET ORAL
COMMUNITY
End: 2022-02-16

## 2022-02-16 RX ORDER — METHOCARBAMOL 500 MG/1
500 TABLET, FILM COATED ORAL
Qty: 40 TABLET | Refills: 0 | Status: SHIPPED | OUTPATIENT
Start: 2022-02-16

## 2022-02-16 RX ORDER — FENTANYL 12.5 UG/1
PATCH TRANSDERMAL
COMMUNITY
End: 2022-08-18

## 2022-02-16 RX ORDER — NALOXONE HYDROCHLORIDE 4 MG/.1ML
4 SPRAY NASAL AS NEEDED
COMMUNITY
Start: 2022-02-08

## 2022-02-16 RX ORDER — ARIPIPRAZOLE 10 MG/1
TABLET ORAL
COMMUNITY

## 2022-02-16 RX ORDER — HYDROXYZINE PAMOATE 50 MG/1
CAPSULE ORAL
COMMUNITY

## 2022-02-16 RX ORDER — AMITRIPTYLINE HYDROCHLORIDE 50 MG/1
1 TABLET, FILM COATED ORAL
COMMUNITY
Start: 2021-06-15

## 2022-02-16 RX ORDER — TRAZODONE HYDROCHLORIDE 100 MG/1
TABLET ORAL
COMMUNITY

## 2022-02-16 RX ORDER — DULOXETIN HYDROCHLORIDE 30 MG/1
CAPSULE, DELAYED RELEASE ORAL
COMMUNITY
End: 2022-02-16

## 2022-02-16 RX ORDER — ACETAMINOPHEN 325 MG/1
650 TABLET ORAL
Qty: 20 TABLET | Refills: 0 | Status: SHIPPED | OUTPATIENT
Start: 2022-02-16

## 2022-02-16 RX ORDER — FENTANYL 25 UG/1
PATCH TRANSDERMAL
COMMUNITY

## 2022-02-16 RX ORDER — DULOXETIN HYDROCHLORIDE 60 MG/1
CAPSULE, DELAYED RELEASE ORAL
COMMUNITY

## 2022-02-16 RX ORDER — ARIPIPRAZOLE 5 MG/1
TABLET ORAL
COMMUNITY

## 2022-02-16 RX ORDER — ESOMEPRAZOLE MAGNESIUM 40 MG/1
1 CAPSULE, DELAYED RELEASE ORAL DAILY
COMMUNITY
Start: 2021-09-14

## 2022-02-16 RX ORDER — OXYCODONE HYDROCHLORIDE 10 MG/1
TABLET ORAL
COMMUNITY
End: 2022-05-12 | Stop reason: SDUPTHER

## 2022-02-16 NOTE — PATIENT INSTRUCTIONS
Vaccine Information Statement    Influenza (Flu) Vaccine (Inactivated or Recombinant): What You Need to Know    Many vaccine information statements are available in Japanese and other languages. See www.immunize.org/vis. Hojas de información sobre vacunas están disponibles en español y en muchos otros idiomas. Visite www.immunize.org/vis. 1. Why get vaccinated? Influenza vaccine can prevent influenza (flu). Flu is a contagious disease that spreads around the [facility] every year, usually between October and May. Anyone can get the flu, but it is more dangerous for some people. Infants and young children, people 72 years and older, pregnant people, and people with certain health conditions or a weakened immune system are at greatest risk of flu complications. Pneumonia, bronchitis, sinus infections, and ear infections are examples of flu-related complications. If you have a medical condition, such as heart disease, cancer, or diabetes, flu can make it worse. Flu can cause fever and chills, sore throat, muscle aches, fatigue, cough, headache, and runny or stuffy nose. Some people may have vomiting and diarrhea, though this is more common in children than adults. In an average year, thousands of people in the [facility] die from flu, and many more are hospitalized. Flu vaccine prevents millions of illnesses and flu-related visits to the doctor each year. 2. Influenza vaccines     CDC recommends everyone 6 months and older get vaccinated every flu season. Children 6 months through 6years of age may need 2 doses during a single flu season. Everyone else needs only 1 dose each flu season. It takes about 2 weeks for protection to develop after vaccination. There are many flu viruses, and they are always changing. Each year a new flu vaccine is made to protect against the influenza viruses believed to be likely to cause disease in the upcoming flu season.  Even when the vaccine doesnt exactly match these viruses, it may still provide some protection. Influenza vaccine does not cause flu. Influenza vaccine may be given at the same time as other vaccines. 3. Talk with your health care provider    Tell your vaccination provider if the person getting the vaccine:   Has had an allergic reaction after a previous dose of influenza vaccine, or has any severe, life-threatening allergies    Has ever had Guillain-Barré Syndrome (also called GBS)    In some cases, your health care provider may decide to postpone influenza vaccination until a future visit. Influenza vaccine can be administered at any time during pregnancy. People who are or will be pregnant during influenza season should receive inactivated influenza vaccine. People with minor illnesses, such as a cold, may be vaccinated. People who are moderately or severely ill should usually wait until they recover before getting influenza vaccine. Your health care provider can give you more information. 4. Risks of a vaccine reaction     Soreness, redness, and swelling where the shot is given, fever, muscle aches, and headache can happen after influenza vaccination.  There may be a very small increased risk of Guillain-Barré Syndrome (GBS) after inactivated influenza vaccine (the flu shot). Annette Zhou children who get the flu shot along with pneumococcal vaccine (PCV13) and/or DTaP vaccine at the same time might be slightly more likely to have a seizure caused by fever. Tell your health care provider if a child who is getting flu vaccine has ever had a seizure. People sometimes faint after medical procedures, including vaccination. Tell your provider if you feel dizzy or have vision changes or ringing in the ears. As with any medicine, there is a very remote chance of a vaccine causing a severe allergic reaction, other serious injury, or death. 5. What if there is a serious problem?     An allergic reaction could occur after the vaccinated person leaves the clinic. If you see signs of a severe allergic reaction (hives, swelling of the face and throat, difficulty breathing, a fast heartbeat, dizziness, or weakness), call 9-1-1 and get the person to the nearest hospital.    For other signs that concern you, call your health care provider. Adverse reactions should be reported to the Vaccine Adverse Event Reporting System (VAERS). Your health care provider will usually file this report, or you can do it yourself. Visit the VAERS website at www.vaers. [facility].gov or call 4-716.120.5840. VAERS is only for reporting reactions, and VAERS staff members do not give medical advice. 6. The National Vaccine Injury Compensation Program    The [facility] Vaccine Injury Compensation Program (VICP) is a federal program that was created to compensate people who may have been injured by certain vaccines. Claims regarding alleged injury or death due to vaccination have a time limit for filing, which may be as short as two years. Visit the VICP website at www.[facility].gov/vaccinecompensation or call 8-146.646.6862 to learn about the program and about filing a claim. 7. How can I learn more?  Ask your health care provider.  Call your local or state health department.  Visit the website of the Food and Drug Administration (FDA) for vaccine package inserts and additional information at www.fda.gov/vaccines-blood-biologics/vaccines.  Contact the Centers for Disease Control and Prevention (CDC):  - Call 8-513.660.2592 (1-800-CDC-INFO) or  - Visit CDCs influenza website at www.cdc.gov/flu. Vaccine Information Statement   Inactivated Influenza Vaccine   8/6/2021  [address] [place] [id number]   Department of Health and Human Services  Centers for Disease Control and Prevention    Office Use Only

## 2022-02-16 NOTE — PROGRESS NOTES
Chief Complaint   Patient presents with    Follow-up     HTN    Neck Pain     left sided neck and ear pain x1 month, Dr. Letty Gauthier from [facility] pt follow up w/ PCP and ENT        1. Have you been to the ER, urgent care clinic since your last visit? Hospitalized since your last visit? No    2. Have you seen or consulted any other health care providers outside of the [address] [place] [address] since your last visit? Include any pap smears or colon screening. Tess Ohara is a 55 y.o. male who presents for routine immunizations. He denies any symptoms , reactions or allergies that would exclude them from being immunized today. Risks and adverse reactions were discussed and the VIS was given to them. All questions were addressed. He was observed for 10 min post injection. There were no reactions observed.     Gertrude London    Verbal order given by Nancy Cornejo NP

## 2022-02-16 NOTE — PROGRESS NOTES
Subjective: (As above and below)     Chief Complaint   Patient presents with    Follow-up     HTN    Neck Pain     left sided neck and ear pain x1 month, Dr. Andi Dennis from [address] [place] [address] pt follow up w/ PCP and ENT      Miah Adam is a 55y.o. year old male who presents for     Hypertension ROS:  taking medications as instructed, no medication side effects noted, no TIAs, no chest pain on exertion, no dyspnea on exertion, no swelling of ankles    Carcinoma of soft pallet: follows w/ ENT and oncology, he has been having increased L ear pain and lymphadenopathy  Sore throat, able to swallow        Reviewed PmHx, RxHx, FmHx, SocHx, AllgHx and updated in chart. History reviewed. No pertinent family history. Past Medical History:   Diagnosis Date    Tonsillar abscess 2018    Tonsillar cancer ([facility] [address]     dx 2018      Social History     Socioeconomic History    Marital status: SINGLE   Tobacco Use    Smoking status: Former Smoker     Packs/day: 0.50     Quit date: 2017     Years since quittin.6    Smokeless tobacco: Never Used   Vaping Use    Vaping Use: Never used   Substance and Sexual Activity    Alcohol use: No    Drug use: Yes     Types: Marijuana    Sexual activity: Yes     Partners: Female     Birth control/protection: None          Current Outpatient Medications   Medication Sig    acetaminophen (TYLENOL) 325 mg tablet Take 2 Tablets by mouth every four (4) hours as needed for Pain.  methocarbamoL (ROBAXIN) 500 mg tablet Take 1 Tablet by mouth three (3) times daily as needed for Muscle Spasm(s).  amitriptyline (ELAVIL) 50 mg tablet Take 1 Tablet by mouth.  esomeprazole (NEXIUM) 40 mg capsule Take 1 Capsule by mouth daily.  naloxone (NARCAN) 4 mg/actuation nasal spray 4 mg by Nasal route as needed.  onabotulinumtoxinA (BOTOX) 100 unit injection 100 Units by IntraMUSCular route once.     ondansetron (ZOFRAN ODT) 4 mg disintegrating tablet Take 1 Tab by mouth every eight (8) hours as needed for Nausea or Vomiting.  famotidine (PEPCID) 40 mg tablet TAKE ONE TABLET BY MOUTH AT BEDTIME    gabapentin (NEURONTIN) 300 mg capsule     polyethylene glycol (MIRALAX) 17 gram packet Take 1 Packet by mouth daily.  fentaNYL (DURAGESIC) 100 mcg/hr PATCH 1 Patch by TransDERmal route every seventy-two (72) hours. Max Daily Amount: 1 Patch. Indications: SEVERE PAIN WITH OPIOID TOLERANCE    oxyCODONE (ROXICODONE INTENSOL) 20 mg/mL concentrated solution 1 mL by Per G Tube route every four (4) hours as needed. Max Daily Amount: 120 mg. Indications: SEVERE PAIN WITH OPIOID TOLERANCE    nystatin (MYCOSTATIN) 100,000 unit/mL suspension Take 5 mL by mouth four (4) times daily. swish and spit  Indications: oral candidiasis    lidocaine (XYLOCAINE) 2 % solution Take 15 mL by mouth as needed for Pain.  ARIPiprazole (ABILIFY) 5 mg tablet aripiprazole 5 mg tablet    ARIPiprazole (ABILIFY) 10 mg tablet aripiprazole 10 mg tablet    DULoxetine (CYMBALTA) 30 mg capsule duloxetine 30 mg capsule,delayed release    DULoxetine (CYMBALTA) 60 mg capsule duloxetine 60 mg capsule,delayed release    hydrOXYzine pamoate (VISTARIL) 50 mg capsule hydroxyzine pamoate 50 mg capsule    traZODone (DESYREL) 100 mg tablet trazodone 100 mg tablet    traZODone (DESYREL) 50 mg tablet trazodone 50 mg tablet    fentaNYL (DURAGESIC) 12 mcg/hr patch fentanyl 12 mcg/hr transdermal patch    fentaNYL (DURAGESIC) 25 mcg/hr PATCH fentanyl 25 mcg/hr transdermal patch    oxyCODONE IR (ROXICODONE) 10 mg tab immediate release tablet oxycodone 10 mg tablet    cetirizine (ZYRTEC) 10 mg tablet TAKE 1 TO 2 TABLETS BY MOUTH ONCE OR TWICE A DAY. START BY TAKING AT NIGHT. IF TOLERATED, TAKE MORNING AND NIGHT.  aluminum-magnesium hydroxide 200-200 mg/5 mL susp 30 mL, diphenhydrAMINE 12.5 mg/5 mL elix 75 mg, lidocaine 2 % soln 30 mL, sucralfate 100 mg/mL susp 3 g oral solution (compounded) Take 5 mL by mouth four (4) times daily. No current facility-administered medications for this visit. Review of Systems:   Constitutional:    Negative for fever and chills, negative diaphoresis. HEENT:              Negative for neck pain and stiffness. Eyes:                  Negative for visual disturbance, itching, redness or discharge. Respiratory:        Negative for cough and shortness of breath. Cardiovascular:  Negative for chest pain and palpitations. Gastrointestinal: Negative for nausea, vomiting, abdominal pain, diarrhea or constipation. Genitourinary:     Negative for dysuria and frequency. Musculoskeletal: Negative for falls, tenderness and swelling. Skin:                    Negative for rash, masses or lesions. Neurological:       Negative for dizzyness, seizure, loss of consciousness, weakness and numbness. Objective:     Vitals:    02/16/22 0911   BP: 137/83   Pulse: 72   Resp: 18   Temp: 98.2 °F (36.8 °C)   TempSrc: Temporal   SpO2: 98%   Weight: 290 lb (131.5 kg)   Height: 5' 10\" (1.778 m)       Results for orders placed or performed in visit on 04/02/21   NOVEL CORONAVIRUS (COVID-19)   Result Value Ref Range    SARS-CoV-2, GEOVANY Not Detected        Gen: Oriented to person, place and time and well-developed, well-nourished and in no distress. HEENT:    Head: normocephalic and atraumatic. Eyes:  EOM are normal. Pupils equal and round. Neck:  Normal range of motion. Neck supple. He has increased lymph node swelling to preauricular, anterior lymph nodes  +tenderness  No exudates to tonsils  L ear: no infection   Cardiovascular: normal rate, regular rhythm and normal heart sounds. Pulmonary/Chest:  Effort normal and breath sounds normal.  No respiratory distress. No wheezes, no rales. Abdominal: soft, normal  bowel sounds. Musculoskeletal:  No edema, no tenderness. No calf tenderness or edema. Neurological:  Alert, oriented to person, place and time. Skin: skin is warm and dry. Assessment/ Plan:        Schedule fu w/ ENT soon    1. Neck pain    - methocarbamoL (ROBAXIN) 500 mg tablet; Take 1 Tablet by mouth three (3) times daily as needed for Muscle Spasm(s). Dispense: 40 Tablet; Refill: 0    2. Essential hypertension    - CBC W/O DIFF; Future  - METABOLIC PANEL, COMPREHENSIVE; Future    3. Carcinoma of soft palate (HCC)    - CT NECK SOFT TISSUE W CONT; Future  - CT HEAD FACIAL W WO CONT; Future    4. Mixed hyperlipidemia    - LIPID PANEL; Future    5. Lymphadenopathy    - AMB POC RAPID STREP A  - CT NECK SOFT TISSUE W CONT; Future  - CT HEAD FACIAL W WO CONT; Future    6. Needs flu shot*  - INFLUENZA VIRUS VAC QUAD,SPLIT,PRESV FREE SYRINGE IM      I have discussed the diagnosis with the patient and the intended plan as seen in the above orders. The patient has received an after-visit summary and questions were answered concerning future plans. Pt conveyed understanding of plan. Medication Side Effects and Warnings were discussed with patient: yes  Patient Labs were reviewed: yes  Patient Past Records were reviewed:  yes    Felicity Bush.  Felipa Bhat NP

## 2022-03-18 PROBLEM — R25.2 TRISMUS: Status: ACTIVE | Noted: 2019-01-13

## 2022-03-18 PROBLEM — R11.2 INTRACTABLE NAUSEA AND VOMITING: Status: ACTIVE | Noted: 2018-09-10

## 2022-03-18 PROBLEM — B37.0 CANDIDIASIS OF MOUTH: Status: ACTIVE | Noted: 2018-09-10

## 2022-03-18 PROBLEM — G89.3 CHRONIC PAIN DUE TO MALIGNANT NEOPLASTIC DISEASE: Status: ACTIVE | Noted: 2019-08-02

## 2022-03-18 PROBLEM — C05.1 MALIGNANT NEOPLASM OF SOFT PALATE (HCC): Status: ACTIVE | Noted: 2018-09-10

## 2022-03-19 PROBLEM — L50.8 CHRONIC URTICARIA: Status: ACTIVE | Noted: 2020-01-27

## 2022-03-19 PROBLEM — C09.1: Status: ACTIVE | Noted: 2018-09-10

## 2022-03-19 PROBLEM — G43.009 MIGRAINE WITHOUT AURA AND WITHOUT STATUS MIGRAINOSUS, NOT INTRACTABLE: Status: ACTIVE | Noted: 2021-12-14

## 2022-03-19 PROBLEM — M79.2 NEUROPATHIC PAIN: Status: ACTIVE | Noted: 2019-02-15

## 2022-04-05 ENCOUNTER — TRANSCRIBE ORDER (OUTPATIENT)
Dept: SCHEDULING | Age: 47
End: 2022-04-05

## 2022-04-05 DIAGNOSIS — C05.1 MALIGNANT NEOPLASM OF SOFT PALATE (HCC): Primary | ICD-10-CM

## 2022-04-05 DIAGNOSIS — G89.3 NEOPLASM RELATED PAIN: ICD-10-CM

## 2022-04-05 DIAGNOSIS — C09.1 MALIGNANT NEOPLASM OF TONSILLAR PILLARS (ANTERIOR) (POSTERIOR) (HCC): ICD-10-CM

## 2022-05-02 ENCOUNTER — TRANSCRIBE ORDER (OUTPATIENT)
Dept: SCHEDULING | Age: 47
End: 2022-05-02

## 2022-05-02 DIAGNOSIS — R13.10 DYSPHAGIA: Primary | ICD-10-CM

## 2022-05-06 ENCOUNTER — TRANSCRIBE ORDER (OUTPATIENT)
Dept: SCHEDULING | Age: 47
End: 2022-05-06

## 2022-05-12 ENCOUNTER — OFFICE VISIT (OUTPATIENT)
Dept: INTERNAL MEDICINE CLINIC | Age: 47
End: 2022-05-12
Payer: MEDICARE

## 2022-05-12 VITALS
OXYGEN SATURATION: 93 % | SYSTOLIC BLOOD PRESSURE: 145 MMHG | BODY MASS INDEX: 42.23 KG/M2 | WEIGHT: 295 LBS | HEIGHT: 70 IN | TEMPERATURE: 98.3 F | HEART RATE: 80 BPM | DIASTOLIC BLOOD PRESSURE: 90 MMHG | RESPIRATION RATE: 18 BRPM

## 2022-05-12 DIAGNOSIS — I10 ESSENTIAL HYPERTENSION: ICD-10-CM

## 2022-05-12 DIAGNOSIS — C76.0 HEAD AND NECK CANCER (HCC): ICD-10-CM

## 2022-05-12 DIAGNOSIS — G43.009 MIGRAINE WITHOUT AURA AND WITHOUT STATUS MIGRAINOSUS, NOT INTRACTABLE: ICD-10-CM

## 2022-05-12 DIAGNOSIS — R29.6 FREQUENT FALLS: ICD-10-CM

## 2022-05-12 DIAGNOSIS — N18.2 STAGE 2 CHRONIC KIDNEY DISEASE: ICD-10-CM

## 2022-05-12 DIAGNOSIS — Z00.00 MEDICARE ANNUAL WELLNESS VISIT, SUBSEQUENT: Primary | ICD-10-CM

## 2022-05-12 PROCEDURE — G8754 DIAS BP LESS 90: HCPCS | Performed by: NURSE PRACTITIONER

## 2022-05-12 PROCEDURE — G8427 DOCREV CUR MEDS BY ELIG CLIN: HCPCS | Performed by: NURSE PRACTITIONER

## 2022-05-12 PROCEDURE — G8432 DEP SCR NOT DOC, RNG: HCPCS | Performed by: NURSE PRACTITIONER

## 2022-05-12 PROCEDURE — 99214 OFFICE O/P EST MOD 30 MIN: CPT | Performed by: NURSE PRACTITIONER

## 2022-05-12 PROCEDURE — G8753 SYS BP > OR = 140: HCPCS | Performed by: NURSE PRACTITIONER

## 2022-05-12 PROCEDURE — G0439 PPPS, SUBSEQ VISIT: HCPCS | Performed by: NURSE PRACTITIONER

## 2022-05-12 PROCEDURE — G8417 CALC BMI ABV UP PARAM F/U: HCPCS | Performed by: NURSE PRACTITIONER

## 2022-05-12 RX ORDER — OXYCODONE HYDROCHLORIDE 10 MG/1
10 TABLET ORAL
Qty: 60 TABLET | Refills: 0 | Status: SHIPPED | OUTPATIENT
Start: 2022-05-12 | End: 2022-06-11

## 2022-05-12 RX ORDER — OXYCODONE HYDROCHLORIDE 10 MG/1
TABLET ORAL
Status: CANCELLED | OUTPATIENT
Start: 2022-05-12

## 2022-05-12 RX ORDER — AMLODIPINE BESYLATE 5 MG/1
5 TABLET ORAL DAILY
Qty: 90 TABLET | Refills: 0 | Status: SHIPPED | OUTPATIENT
Start: 2022-05-12 | End: 2022-08-18 | Stop reason: SDUPTHER

## 2022-05-12 NOTE — PROGRESS NOTES
Chief Complaint   Patient presents with    Follow-up       1. \"Have you been to the ER, urgent care clinic since your last visit? Hospitalized since your last visit? \" No    2. \"Have you seen or consulted any other health care providers outside of the [address] [place] [address] since your last visit? \" No     3. For patients aged 39-70: Has the patient had a colonoscopy / FIT/ Cologuard? Yes - Care Gap present. Most recent result on file      If the patient is female:    4. For patients aged 41-77: Has the patient had a mammogram within the past 2 years? NA - based on age or sex      11. For patients aged 21-65: Has the patient had a pap smear?  NA - based on age or sex

## 2022-05-12 NOTE — PATIENT INSTRUCTIONS
DASH Diet: Care Instructions  Your Care Instructions     The DASH diet is an eating plan that can help lower your blood pressure. DASH stands for Dietary Approaches to Stop Hypertension. Hypertension is high blood pressure. The DASH diet focuses on eating foods that are high in calcium, potassium, and magnesium. These nutrients can lower blood pressure. The foods that are highest in these nutrients are fruits, vegetables, low-fat dairy products, nuts, seeds, and legumes. But taking calcium, potassium, and magnesium supplements instead of eating foods that are high in those nutrients does not have the same effect. The DASH diet also includes whole grains, fish, and poultry. The DASH diet is one of several lifestyle changes your doctor may recommend to lower your high blood pressure. Your doctor may also want you to decrease the amount of sodium in your diet. Lowering sodium while following the DASH diet can lower blood pressure even further than just the DASH diet alone. Follow-up care is a key part of your treatment and safety. Be sure to make and go to all appointments, and call your doctor if you are having problems. It's also a good idea to know your test results and keep a list of the medicines you take. How can you care for yourself at home? Following the DASH diet  · Eat 4 to 5 servings of fruit each day. A serving is 1 medium-sized piece of fruit, ½ cup chopped or canned fruit, 1/4 cup dried fruit, or 4 ounces (½ cup) of fruit juice. Choose fruit more often than fruit juice. · Eat 4 to 5 servings of vegetables each day. A serving is 1 cup of lettuce or raw leafy vegetables, ½ cup of chopped or cooked vegetables, or 4 ounces (½ cup) of vegetable juice. Choose vegetables more often than vegetable juice. · Get 2 to 3 servings of low-fat and fat-free dairy each day. A serving is 8 ounces of milk, 1 cup of yogurt, or 1 ½ ounces of cheese. · Eat 6 to 8 servings of grains each day.  A serving is 1 slice of bread, 1 ounce of dry cereal, or ½ cup of cooked rice, pasta, or cooked cereal. Try to choose whole-grain products as much as possible. · Limit lean meat, poultry, and fish to 2 servings each day. A serving is 3 ounces, about the size of a deck of cards. · Eat 4 to 5 servings of nuts, seeds, and legumes (cooked dried beans, lentils, and split peas) each week. A serving is 1/3 cup of nuts, 2 tablespoons of seeds, or ½ cup of cooked beans or peas. · Limit fats and oils to 2 to 3 servings each day. A serving is 1 teaspoon of vegetable oil or 2 tablespoons of salad dressing. · Limit sweets and added sugars to 5 servings or less a week. A serving is 1 tablespoon jelly or jam, ½ cup sorbet, or 1 cup of lemonade. · Eat less than 2,300 milligrams (mg) of sodium a day. If you limit your sodium to 1,500 mg a day, you can lower your blood pressure even more. · Be aware that all of these are the suggested number of servings for people who eat 1,800 to 2,000 calories a day. Your recommended number of servings may be different if you need more or fewer calories. Tips for success  · Start small. Do not try to make dramatic changes to your diet all at once. You might feel that you are missing out on your favorite foods and then be more likely to not follow the plan. Make small changes, and stick with them. Once those changes become habit, add a few more changes. · Try some of the following:  ? Make it a goal to eat a fruit or vegetable at every meal and at snacks. This will make it easy to get the recommended amount of fruits and vegetables each day. ? Try yogurt topped with fruit and nuts for a snack or healthy dessert. ? Add lettuce, tomato, cucumber, and onion to sandwiches. ? Combine a ready-made pizza crust with low-fat mozzarella cheese and lots of vegetable toppings. Try using tomatoes, squash, spinach, broccoli, carrots, cauliflower, and onions. ?  Have a variety of cut-up vegetables with a low-fat dip as an appetizer instead of chips and dip. ? Sprinkle sunflower seeds or chopped almonds over salads. Or try adding chopped walnuts or almonds to cooked vegetables. ? Try some vegetarian meals using beans and peas. Add garbanzo or kidney beans to salads. Make burritos and tacos with mashed jay beans or black beans. Where can you learn more? Go to http://www.daigle.com/  Enter H967 in the search box to learn more about \"DASH Diet: Care Instructions. \"  Current as of: January 10, 2022               Content Version: 13.2  © 9516-1192 [employer]. Care instructions adapted under license by [employer] (which disclaims liability or warranty for this information). If you have questions about a medical condition or this instruction, always ask your healthcare professional. [address] any warranty or liability for your use of this information.

## 2022-05-12 NOTE — PROGRESS NOTES
Subjective: (As above and below)     Chief Complaint   Patient presents with   Delaney Yoo is a 55y.o. year old male who presents for AWV and     Hx of tonsillar cancer: follows w/ oncology, was seeing pain mgmt but they are leaving, he has been on opiates for years since ca dx      Noted elevated BP:  BP Readings from Last 3 Encounters:   22 (!) 145/90   22 137/83   21 (!) 161/114     Falls; he has been having increased falls, states legs give out at times  Denies explicit back pain or neuropathy  He uses a cane, could benefit from a walker for long distances        Reviewed PmHx, RxHx, FmHx, SocHx, AllgHx and updated in chart. History reviewed. No pertinent family history. Past Medical History:   Diagnosis Date    Tonsillar abscess 2018    Tonsillar cancer ([facility] [address]     dx 2018      Social History     Socioeconomic History    Marital status: SINGLE   Tobacco Use    Smoking status: Former Smoker     Packs/day: 0.50     Quit date: 2017     Years since quittin.8    Smokeless tobacco: Never Used   Vaping Use    Vaping Use: Never used   Substance and Sexual Activity    Alcohol use: No    Drug use: Yes     Types: Marijuana    Sexual activity: Yes     Partners: Female     Birth control/protection: None          Current Outpatient Medications   Medication Sig    oxyCODONE IR (ROXICODONE) 10 mg tab immediate release tablet Take 1 Tablet by mouth every eight (8) hours as needed for Pain for up to 30 days. Max Daily Amount: 30 mg.    amLODIPine (NORVASC) 5 mg tablet Take 1 Tablet by mouth daily. For blood pressure    acetaminophen (TYLENOL) 325 mg tablet Take 2 Tablets by mouth every four (4) hours as needed for Pain.  methocarbamoL (ROBAXIN) 500 mg tablet Take 1 Tablet by mouth three (3) times daily as needed for Muscle Spasm(s).  amitriptyline (ELAVIL) 50 mg tablet Take 1 Tablet by mouth.     ARIPiprazole (ABILIFY) 10 mg tablet aripiprazole 10 mg tablet    DULoxetine (CYMBALTA) 60 mg capsule duloxetine 60 mg capsule,delayed release    esomeprazole (NEXIUM) 40 mg capsule Take 1 Capsule by mouth daily.  hydrOXYzine pamoate (VISTARIL) 50 mg capsule hydroxyzine pamoate 50 mg capsule    onabotulinumtoxinA (BOTOX) 100 unit injection 100 Units by IntraMUSCular route once.  traZODone (DESYREL) 100 mg tablet trazodone 100 mg tablet    ondansetron (ZOFRAN ODT) 4 mg disintegrating tablet Take 1 Tab by mouth every eight (8) hours as needed for Nausea or Vomiting.  gabapentin (NEURONTIN) 300 mg capsule     cetirizine (ZYRTEC) 10 mg tablet TAKE 1 TO 2 TABLETS BY MOUTH ONCE OR TWICE A DAY. START BY TAKING AT NIGHT. IF TOLERATED, TAKE MORNING AND NIGHT.  polyethylene glycol (MIRALAX) 17 gram packet Take 1 Packet by mouth daily.  fentaNYL (DURAGESIC) 100 mcg/hr PATCH 1 Patch by TransDERmal route every seventy-two (72) hours. Max Daily Amount: 1 Patch. Indications: SEVERE PAIN WITH OPIOID TOLERANCE    nystatin (MYCOSTATIN) 100,000 unit/mL suspension Take 5 mL by mouth four (4) times daily. swish and spit  Indications: oral candidiasis    lidocaine (XYLOCAINE) 2 % solution Take 15 mL by mouth as needed for Pain.  ARIPiprazole (ABILIFY) 5 mg tablet aripiprazole 5 mg tablet    naloxone (NARCAN) 4 mg/actuation nasal spray 4 mg by Nasal route as needed.  fentaNYL (DURAGESIC) 12 mcg/hr patch fentanyl 12 mcg/hr transdermal patch    fentaNYL (DURAGESIC) 25 mcg/hr PATCH fentanyl 25 mcg/hr transdermal patch    aluminum-magnesium hydroxide 200-200 mg/5 mL susp 30 mL, diphenhydrAMINE 12.5 mg/5 mL elix 75 mg, lidocaine 2 % soln 30 mL, sucralfate 100 mg/mL susp 3 g oral solution (compounded) Take 5 mL by mouth four (4) times daily. No current facility-administered medications for this visit. Review of Systems:   Constitutional:    Negative for fever and chills, negative diaphoresis. HEENT:              Negative for neck pain and stiffness.   Eyes: Negative for visual disturbance, itching, redness or discharge. Respiratory:        Negative for cough and shortness of breath. Cardiovascular:  Negative for chest pain and palpitations. Gastrointestinal: Negative for nausea, vomiting, abdominal pain, diarrhea or constipation. Genitourinary:     Negative for dysuria and frequency. Musculoskeletal: Negative for falls, tenderness and swelling. Skin:                    Negative for rash, masses or lesions. Neurological:       Negative for dizzyness, seizure, loss of consciousness, weakness and numbness. Objective:     Vitals:    05/12/22 0846 05/12/22 0918   BP: (!) 158/86 (!) 145/90   Pulse: 74 80   Resp: 18    Temp: 98.3 °F (36.8 °C)    TempSrc: Temporal    SpO2: 93%    Weight: 295 lb (133.8 kg)    Height: 5' 10\" (1.778 m)      Gen: Oriented to person, place and time and well-developed, well-nourished and in no distress. HEENT:    Head: normocephalic and atraumatic. Eyes:  EOM are normal. Pupils equal and round. Neck:  Normal range of motion. Neck supple. Cardiovascular: normal rate, regular rhythm and normal heart sounds. Pulmonary/Chest:  Effort normal and breath sounds normal.  No respiratory distress. No wheezes, no rales. Abdominal: soft, normal  bowel sounds. Musculoskeletal:  No edema, no tenderness. No calf tenderness or edema. Neurological:  Alert, oriented to person, place and time. Skin: skin is warm and dry. Assessment/ Plan:     Follow-up and Dispositions    · Return in about 1 month (around 6/12/2022) for bp fu.         1. Medicare annual wellness visit, subsequent      2. Head and neck cancer (HCC)    - REFERRAL TO PAIN MANAGEMENT  - oxyCODONE IR (ROXICODONE) 10 mg tab immediate release tablet; Take 1 Tablet by mouth every eight (8) hours as needed for Pain for up to 30 days. Max Daily Amount: 30 mg. Dispense: 60 Tablet; Refill: 0    3.  Migraine without aura and without status migrainosus, not intractable      4. Stage 2 chronic kidney disease    - METABOLIC PANEL, BASIC    5. Frequent falls  Neuro INI  - REFERRAL TO PHYSICAL THERAPY  - AMB SUPPLY ORDER    6. Essential hypertension  New med, discussed DASH diet  - amLODIPine (NORVASC) 5 mg tablet; Take 1 Tablet by mouth daily. For blood pressure  Dispense: 90 Tablet; Refill: 0        I have discussed the diagnosis with the patient and the intended plan as seen in the above orders. The patient has received an after-visit summary and questions were answered concerning future plans. Pt conveyed understanding of plan. Medication Side Effects and Warnings were discussed with patient: yes  Patient Labs were reviewed: yes  Patient Past Records were reviewed:  yes    Matthew Scales. Whitney Jorge NP     This is the Subsequent Medicare Annual Wellness Exam, performed 12 months or more after the Initial AWV or the last Subsequent AWV    I have reviewed the patient's medical history in detail and updated the computerized patient record. Assessment/Plan   Education and counseling provided:  Are appropriate based on today's review and evaluation    1. Medicare annual wellness visit, subsequent  2. Head and neck cancer (HCC)  -     REFERRAL TO PAIN MANAGEMENT  -     oxyCODONE IR (ROXICODONE) 10 mg tab immediate release tablet; Take 1 Tablet by mouth every eight (8) hours as needed for Pain for up to 30 days. Max Daily Amount: 30 mg., Normal, Disp-60 Tablet, R-0  3. Migraine without aura and without status migrainosus, not intractable  4. Stage 2 chronic kidney disease  -     METABOLIC PANEL, BASIC  5.  Frequent falls  -     REFERRAL TO PHYSICAL THERAPY       Depression Risk Factor Screening     3 most recent PHQ Screens 5/12/2022   Little interest or pleasure in doing things Several days   Feeling down, depressed, irritable, or hopeless Not at all   Total Score PHQ 2 1       Alcohol & Drug Abuse Risk Screen    Do you average more than 2 drinks per night or 14 drinks a week: No    On any one occasion in the past three months have you have had more than 4 drinks containing alcohol:  No          Functional Ability and Level of Safety    Hearing: Hearing is good. Activities of Daily Living: The home contains: no safety equipment. Patient does total self care      Ambulation: with no difficulty     Fall Risk:  Fall Risk Assessment, last 12 mths 5/12/2022   Able to walk? Yes   Fall in past 12 months? 1   Do you feel unsteady? 1   Are you worried about falling 1   Is the gait abnormal? 1   Number of falls in past 12 months 2   Fall with injury? 0      Abuse Screen:  Patient is not abused       Cognitive Screening    Has your family/caregiver stated any concerns about your memory: no     Cognitive Screening: Normal - based on H&P    Health Maintenance Due     Health Maintenance Due   Topic Date Due    Hepatitis C Screening  Never done    COVID-19 Vaccine (1) Never done    Pneumococcal 0-64 years (2 - PCV) 02/25/2021    Medicare Yearly Exam  05/12/2022       Patient Care Team   Patient Care Team:  Mina Wong NP as PCP - General (Nurse Practitioner)  Mina Wong NP as PCP - [facility] Empaneled Provider    History     Patient Active Problem List   Diagnosis Code    Unspecified asthma, with exacerbation J45. 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 Max Daily Amount: 30 mg. 60 Tablet 0    amLODIPine (NORVASC) 5 mg tablet Take 1 Tablet by mouth daily. For blood pressure 90 Tablet 0    acetaminophen (TYLENOL) 325 mg tablet Take 2 Tablets by mouth every four (4) hours as needed for Pain. 20 Tablet 0    methocarbamoL (ROBAXIN) 500 mg tablet Take 1 Tablet by mouth three (3) times daily as needed for Muscle Spasm(s). 40 Tablet 0    amitriptyline (ELAVIL) 50 mg tablet Take 1 Tablet by mouth.  ARIPiprazole (ABILIFY) 10 mg tablet aripiprazole 10 mg tablet      DULoxetine (CYMBALTA) 60 mg capsule duloxetine 60 mg capsule,delayed release      esomeprazole (NEXIUM) 40 mg capsule Take 1 Capsule by mouth daily.  hydrOXYzine pamoate (VISTARIL) 50 mg capsule hydroxyzine pamoate 50 mg capsule      onabotulinumtoxinA (BOTOX) 100 unit injection 100 Units by IntraMUSCular route once.  traZODone (DESYREL) 100 mg tablet trazodone 100 mg tablet      ondansetron (ZOFRAN ODT) 4 mg disintegrating tablet Take 1 Tab by mouth every eight (8) hours as needed for Nausea or Vomiting. 30 Tab 1    gabapentin (NEURONTIN) 300 mg capsule       cetirizine (ZYRTEC) 10 mg tablet TAKE 1 TO 2 TABLETS BY MOUTH ONCE OR TWICE A DAY. START BY TAKING AT NIGHT. IF TOLERATED, TAKE MORNING AND NIGHT.  polyethylene glycol (MIRALAX) 17 gram packet Take 1 Packet by mouth daily. 30 Packet 1    fentaNYL (DURAGESIC) 100 mcg/hr PATCH 1 Patch by TransDERmal route every seventy-two (72) hours. Max Daily Amount: 1 Patch. Indications: SEVERE PAIN WITH OPIOID TOLERANCE 5 Patch 0    nystatin (MYCOSTATIN) 100,000 unit/mL suspension Take 5 mL by mouth four (4) times daily.  swish and spit  Indications: oral candidiasis 100 mL 0    lidocaine (XYLOCAINE) 2 % solution Take 15 mL by mouth as needed for Pain. 1 Bottle 1    ARIPiprazole (ABILIFY) 5 mg tablet aripiprazole 5 mg tablet      naloxone (NARCAN) 4 mg/actuation nasal spray 4 mg by Nasal route as needed.  fentaNYL (DURAGESIC) 12 mcg/hr patch fentanyl 12 mcg/hr transdermal patch      fentaNYL (DURAGESIC) 25 mcg/hr PATCH fentanyl 25 mcg/hr transdermal patch      aluminum-magnesium hydroxide 200-200 mg/5 mL susp 30 mL, diphenhydrAMINE 12.5 mg/5 mL elix 75 mg, lidocaine 2 % soln 30 mL, sucralfate 100 mg/mL susp 3 g oral solution (compounded) Take 5 mL by mouth four (4) times daily. 1 Bottle 1     Allergies   Allergen Reactions    Nsaids (Non-Steroidal Anti-Inflammatory Drug) Swelling    Aspirin Angioedema    Ibuprofen Angioedema    Naproxen Angioedema    Fish Oil Unknown (comments)       History reviewed. No pertinent family history. Social History     Tobacco Use    Smoking status: Former Smoker     Packs/day: 0.50     Quit date: 2017     Years since quittin.8    Smokeless tobacco: Never Used   Substance Use Topics    Alcohol use: No         Mery David NP

## 2022-05-13 LAB
BUN SERPL-MCNC: 17 MG/DL (ref 6–24)
BUN/CREAT SERPL: 12 (ref 9–20)
CALCIUM SERPL-MCNC: 9.5 MG/DL (ref 8.7–10.2)
CHLORIDE SERPL-SCNC: 100 MMOL/L (ref 96–106)
CO2 SERPL-SCNC: 25 MMOL/L (ref 20–29)
CREAT SERPL-MCNC: 1.44 MG/DL (ref 0.76–1.27)
EGFR: 61 ML/MIN/1.73
GLUCOSE SERPL-MCNC: 103 MG/DL (ref 65–99)
POTASSIUM SERPL-SCNC: 4.3 MMOL/L (ref 3.5–5.2)
SODIUM SERPL-SCNC: 142 MMOL/L (ref 134–144)

## 2022-05-25 ENCOUNTER — HOSPITAL ENCOUNTER (OUTPATIENT)
Dept: GENERAL RADIOLOGY | Age: 47
Discharge: HOME | End: 2022-05-25
Attending: INTERNAL MEDICINE
Payer: MEDICARE

## 2022-05-25 DIAGNOSIS — R13.10 DYSPHAGIA: ICD-10-CM

## 2022-05-25 PROCEDURE — 74230 X-RAY XM SWLNG FUNCJ C+: CPT

## 2022-05-25 PROCEDURE — 92611 MOTION FLUOROSCOPY/SWALLOW: CPT

## 2022-05-25 NOTE — PROGRESS NOTES
[facility]re[facility] [address] STUDY  Patient: Lupis Paz (84 y.o. male)  Date: 5/25/2022  Referring Provider: Dr. Liliya Feliz:   Patient with history of tonsillar CA s/p XRT a few years ago. Patient reports difficulty swallowing, specifically with solids and requires liquid wash to follow. Patient reports he just started with outpatient speech therapy through [address] [place] [address]. He had a MBS completed in 2019 (unable to view SLP report, only radiology report). At that time, patient with significant vallecular residue but no penetration or aspiration. Patient denies any changes in respiratory status. He is ambulatory with a cane. OBJECTIVE:   Past Medical History:   Past Medical History:   Diagnosis Date    Tonsillar abscess 05/2018    Tonsillar cancer ([address]     dx 6/2018     Past Surgical History:   Procedure Laterality Date    HX HERNIA REPAIR       Current Dietary Status:  Regular diet/ thin liquids   Radiologist: Dr. Baez Arch Views: Lateral;Fluoro  Patient Position: standing    Trial 1: Trial 2:   Consistency Presented:  Thin liquid Consistency Presented: Puree   How Presented: Self-fed/presented;Cup/sip;Straw How Presented: SLP-fed/presented;Spoon   Consistency Amount:  (4oz) Consistency Amount:  (2 Tbsp)   Bolus Acceptance: No impairment Bolus Acceptance: No impairment   Bolus Formation/Control: No impairment:   Bolus Formation/Control: No impairment:     Propulsion: No impairment (mildly) Propulsion: No impairment   Oral Residue: None Oral Residue: None   Initiation of Swallow: Triggered at pyriform sinus(es) Initiation of Swallow: Triggered at valleculae   Timing: No impairment Timing: No impairment   Penetration: Trace;During swallow (clears with the force of the swallow) Penetration: None   Aspiration/Timing: No evidence of aspiration Aspiration/Timing: No evidence of aspiration Pharyngeal Clearance: Vallecular residue;Pyriform residue ; Less than 10% Pharyngeal Clearance: Vallecular residue;10-50%     Attempted Modifications: Double swallow     Effective Modifications: Double swallow     Cues for Modifications: None           Trial 3:   Consistency Presented: Solid   How Presented: Self-fed/presented   Consistency Amount:  (1/4 cracker)   Bolus Acceptance: No impairment   Bolus Formation/Control: No impairment, issues, or problems :     Propulsion: Delayed (# of seconds)   Oral Residue: None   Initiation of Swallow: Triggered at valleculae   Timing: No impairment   Penetration: None   Aspiration/Timing: No evidence of aspiration   Pharyngeal Clearance: Vallecular residue;Greater than 50%   Attempted Modifications: Alternate liquids/solids   Effective Modifications: Alternate liquids/solids (eliminates majority of pharyngeal residue)   Cues for Modifications: None         Decreased Tongue Base Retraction?: Yes  Laryngeal Elevation: Inadequate epiglottic inversion; Incomplete laryngeal closure  Aspiration/Penetration Score: 2 (Penetration/No residue-Contrast enters the airway penetrates, remains above the folds/cords, and is cleared)  Pharyngeal Symmetry: Not assessed  Pharyngeal-Esophageal Segment: No impairment  Pharyngeal Dysfunction: Decreased tongue base retraction;Decreased strength;Decreased elevation/closure;Decreased pharyngeal wall constriction    Oral Phase Severity: No impairment  Pharyngeal Phase Severity: Mild    ASSESSMENT :  Based on the objective data described above, the patient presents with mild pharyngeal dysphagia. Patient with mildly delayed posterior propulsion of solid related to xerostomia; however, complete oral clearance. Pharyngeal dysphagia characterized by decreased tongue base retraction, minimal to no epiglottic inversion with incomplete laryngeal closure and decreased pharyngeal constriction.  Secondary to incomplete closure, patient with trace penetration with thin liquids during the swallow, though fully cleared. Secondary to decreased tongue base retraction and poor epiglottic inversion, patient with moderate-severe vallecular residue with solids. Mild coating along the posterior pharyngeal wall. With utilization of liquid wash, majority of pharyngeal residue cleared. Patient with throat clearing and cough throughout study in the absence of airway compromise. Likely due to intact sensation to pharyngeal residue. PLAN/RECOMMENDATIONS :  -- Regular diet cut into smaller bites and moistened with sauces and condiments/ thin liquids. Straws Ok  -- alternate liquids/ solids to aid in pharyngeal clearance  -- Encourage follow up with outpatient SLP to initiate swallow exercises to be completed on a daily basis       COMMUNICATION/EDUCATION:   The above findings and recommendations were discussed with: patient who verbalized understanding.     Thank you for this referral.  Brandan Hutchinson M.S. CCC-SLP  Time Calculation: 20 mins

## 2022-05-26 ENCOUNTER — HOSPITAL ENCOUNTER (OUTPATIENT)
Dept: PHYSICAL THERAPY | Age: 47
Discharge: HOME | End: 2022-05-26

## 2022-05-26 NOTE — THERAPY EVALUATION
[facility]  [address], [address] [facility]    OUTPATIENT PHYSICAL THERAPY      5/26/2022:  Radha Smith was not seen on this date for physical therapy for the following reasons:    [x]     Patient called to cancel the visit for the following reasons: held up at another medical appointment  []     Patient missed the visit and did not call to cancel.     Dayana Villagomez, PT

## 2022-06-03 ENCOUNTER — HOSPITAL ENCOUNTER (OUTPATIENT)
Dept: PHYSICAL THERAPY | Age: 47
Discharge: HOME | End: 2022-06-03
Payer: MEDICARE

## 2022-06-03 PROCEDURE — 97112 NEUROMUSCULAR REEDUCATION: CPT | Performed by: PHYSICAL THERAPIST

## 2022-06-03 PROCEDURE — 97161 PT EVAL LOW COMPLEX 20 MIN: CPT | Performed by: PHYSICAL THERAPIST

## 2022-06-03 PROCEDURE — 97110 THERAPEUTIC EXERCISES: CPT | Performed by: PHYSICAL THERAPIST

## 2022-06-03 NOTE — THERAPY EVALUATION
[facility]    OUTPATIENT PHYSICAL THERAPY    INITIAL EVALUATION      NAME: An Dailey AGE: 55 y.o. GENDER: male  DATE: 6/3/2022  REFERRING PHYSICIAN: Marcy BRADY, *    OBJECTIVE DATA SUMMARY:   Medical Diagnosis: Unsteadiness on feet R26.81  PT Diagnosis: Decreased balance and deconditioning following cancer treatment  Date of Onset: 2018  Mechanism of Injury/Chief Complaint:  Following treatment for throat cancer  Present Symptoms: Constant lumbar and LE pain with 6-7 falls in the last year    Functional Deficits and Limitations:   []     Sitting:   [x]    Dressing:   []    Reaching:  [x]     Standing:   [x]    Bathing:   [x]    Lifting:  [x]     Walking:   [x]    Cooking:   []    Yardwork:  []     Sleeping:   [x]    Cleaning:   []     Driving:  []     Work Tasks:  []    Recreation:   []    Other:    HISTORY:  Past Medical History:   Past Medical History:   Diagnosis Date    Tonsillar abscess 05/2018    Tonsillar cancer ([address]     dx 6/2018     Past Surgical History:   Procedure Laterality Date    HX HERNIA REPAIR       Precautions:    Allergies: Nsaids (non-steroidal anti-inflammatory drug), Aspirin, Ibuprofen, Naproxen, and Fish oil    Current Medications:   Medication    oxyCODONE IR (ROXICODONE) 10 mg tab immediate release tablet    amLODIPine (NORVASC) 5 mg tablet    acetaminophen (TYLENOL) 325 mg tablet    methocarbamoL (ROBAXIN) 500 mg tablet    amitriptyline (ELAVIL) 50 mg tablet    ARIPiprazole (ABILIFY) 10 mg tablet    DULoxetine (CYMBALTA) 60 mg capsule    esomeprazole (NEXIUM) 40 mg capsule    hydrOXYzine pamoate (VISTARIL) 50 mg capsule    naloxone (NARCAN) 4 mg/actuation nasal spray    onabotulinumtoxinA (BOTOX) 100 unit injection    traZODone (DESYREL) 100 mg tablet    fentaNYL (DURAGESIC) 12 mcg/hr patch    ondansetron (ZOFRAN ODT) 4 mg disintegrating tablet    gabapentin (NEURONTIN) 300 mg capsule    cetirizine (ZYRTEC) 10 mg tablet    polyethylene glycol (MIRALAX) 17 gram packet    fentaNYL (DURAGESIC) 100 mcg/hr PATCH    nystatin (MYCOSTATIN) 100,000 unit/mL suspension    lidocaine (XYLOCAINE) 2 % solution      Prior Level of Function/Home Situation: Uses SPC or rollator for ambulation. PCA to assist with ADLs  Personal factors and/or comorbidities impacting plan of care: Sees oncologist 4x/ year  Social/Work History:  Not working  Previous Therapy:  Had started before COVID with moderate success  SUBJECTIVE:   \"I didn't think the cancer would get me like this. \"    Patients goals for therapy: To get get my legs stronger and for my pain to go down. To stop falling.   OBJECTIVE DATA SUMMARY:   EXAMINATION/PRESENTATION/DECISION MAKING:   Pain:  Location: LBP with pain extending into groin and posterior leg into ankle  Quality: aching and tingling  Now: 8/10  Best: 7/10  Worst: 10/10  Easing Factors: medication: with success  Aggravating Factors: walking, standing    Strength:      LEFT  RIGHT  Hip flexion  3+/5  3/5  Hip abduction  4/5  4/5  Hip extension  3+/5  3+/5  Knee flexion  4/5  3+/5  Knee extension  3/5  3/5   Ankle DF  3/5  3/5  Ankle PF  2/5  2/5     Range of Motion:   LE AROM generally decreased, within functional limits    Spinal Assessment:   Lumbar Spine (AROM)  Flexion  50% limited  Extension* 75% limited  R side bend* 75% limited  L side bend* 75% limited  R rotation 100% limited with loss of balance  L rotation 100% limited with loss of balance     Neurologic Assessment:  Sensation: light touch intact with diminished response in BL feet    Mobility:  Transitional Movements: slow, antalgic, use of SPC  Gait: forward flexed, slowed, antalgic, use of SPC    Balance:    Barillas Balance Test:    Sitting to Standin  Standing Unsupported: 3  Sitting with Back Unsupported: 4  Standing to Sittin  Transfers: 2  Standing Unsupported with Eyes Closed: 1  Standing Unsupported with Feet Together: 1  Reach Forward with Outstretched Arm: 1   Object: 1  Turn to Look Over Shoulders: 1  Turn 360 Degrees: 0  Alternate Foot on Step/Stool: 0  Standing Unsupported One Foot in Front: 0  Stand on One Le  Total: 16/56     56=Maximum possible score;   0-20=High fall risk  21-40=Moderate fall risk   41-56=Low fall risk     Functional Measure:   LEFS: 24/80; 70% impaired     Physical Therapy Evaluation Charge Determination   History Examination Presentation Decision-Making   LOW Complexity : Zero comorbidities / personal factors that will impact the outcome / POC LOW Complexity : 1-2 Standardized tests and measures addressing body structure, function, activity limitation and / or participation in recreation  LOW Complexity : Stable, uncomplicated  Other outcome measures LEFS  LOW       Based on the above components, the patient evaluation is determined to be of the following complexity level: LOW     TREATMENT/INTERVENTION:  --Interventions in BOLD performed today--    Modalities (Rationale): none today    Therapeutic Exercises: to develop strength, endurance, range of motion, and flexibility  Sitting:  LAQ 10x each leg  Hip ABD RB 10x  Hip ADD squeeze 10x 3\" hold  Ankle DF RTB 10x each side    Manual Therapy: for joint mobilization/manipulations and soft tissue mobilization  None today    Neuro Re-Education: to improve movement, balance, coordination, kinesthetic sense, posture, and proprioception for sitting or standing balance  NBOS 2x 15\" hold  Normal stance EC 2x 15\" hold  Normal stance EO with perturbations and cues for decreasing posterior weight shift    Therapeutic Activities: to improve functional performance, power, or agility  None today    Ambulation/Gait Training:  None today    Activity tolerance and post treatment pain report:   Fair, fatigue and back pain noted with all activity    Education:  [x]     Home exercise program provided.   Education was provided to the patient on the following topics: anatomy and pathophysiology related to symptoms. Access Code: [email or web address]  URL: [email or web address]  Date: 06/03/2022  Prepared by: Juan Daniel Joseph  Exercises  Seated Long Arc Quad - 2 x daily - 10 reps  Seated Isometric Hip Adduction with Pelvic Floor Contraction - 2 x daily - 10 reps - 3sec hold  Seated Hip Abduction with Resistance - 2 x daily - 10 reps - 3sec hold  CLX Ankle Dorsiflexion and Eversion - 2 x daily - 10 reps    Patient verbalized understanding of the topics presented. ASSESSMENT:   Stanley Barclay is a 55 y.o. male who presents with decreased balance and generalized deconditioned following 2018 cancer treatment including chemotherapy and radiation. Physical therapy problems based on objective data include: pain affecting function, decrease strength, impaired gait/ balance, decrease ADL/ functional abilitiies, decrease activity tolerance and decrease transfer abilities. Patient will benefit from skilled intervention to address these impairments to allow for improved ambulatory tolerance and decreased fall risk. Rehabilitation potential is considered to be Fair. Factors which may influence rehabilitation potential include chronicity of condition. PLAN OF CARE:   Recommendations and Planned Interventions Include:  therapeutic activities, therapeutic exercises, gait training, balance training, manual therapy, neuro re-education, posture/biomechanics, heat/ice, ultrasound, E-stim, home exercise program, TENS and pain management     Frequency/Duration:  Patient will benefit from physical therapy visits 1-2 times per week over 8 weeks to optimize improvement in these areas. GOALS  Short term goals  Time frame: 4 weeks  1. Patient will be compliant and independent with the initial HEP as evidenced by being able to perform without cuing. 2. Patient will report a 50% improvement in symptoms. 3. Patient report a 50% improvement in sleeping.   4. Patient will have a 25% degree increase in lumbar extnesion ROM to allow improved standing. 5. Patient will have an increased tolerance for standing to allow 30 minutes of the activity before symptoms start. 6. Patient will tolerate 30 minutes of clinic activities before onset of symptoms. Long term goals  Time frame: 8 weeks  1. Patient will report pain level decrease to 2/10 to allow increased ease of movement. 2. Patient will have an improved score on the LEFS outcome measure by 10 points to demonstrate an increase in functional activity tolerance. 3. Patient will be independent in final individualized HEP. 4. Patient will have an increase in lumbar rotation ROM to less than 50% limited to allow performance of walking tasks. 5. Patient will have an increase in knee extnesion strength to 4+/5 to allow performance of standing and walking tasks. 6. Patient will improve Barillas Balance Score by 8 point to show a decreased fall risk         [x]     Patient has participated in goal setting and agrees to work toward plan of care. [x]     Patient was instructed to call if questions or concerns arise. Thank you for this referral.  Amie Ahmadi, PT   Time Calculation: 42 mins    Patient Time in clinic:   Start Time: 1408   Stop Time: 1450    TREATMENT PLAN EFFECTIVE DATES:   6/3/2022 TO 7/29/22  I have read the above plan of care for Meagan Conrad and certify the need for skilled physical therapy services.     Physician Signature: ____________________________________________________    Date: _________________________________________________________________

## 2022-06-09 ENCOUNTER — HOSPITAL ENCOUNTER (OUTPATIENT)
Dept: PHYSICAL THERAPY | Age: 47
Discharge: HOME | End: 2022-06-09
Payer: MEDICARE

## 2022-06-09 NOTE — PROGRESS NOTES
[facility]  [address] [address] [facility]    OUTPATIENT PHYSICAL THERAPY      6/9/2022:  Michi Calderon was not seen on this date for physical therapy for the following reasons:    [x]     Patient called to cancel the visit for the following reasons: schedule conflict  []     Patient missed the visit and did not call to cancel.     Ralph Bojorquez, PT

## 2022-06-10 ENCOUNTER — HOSPITAL ENCOUNTER (OUTPATIENT)
Dept: PHYSICAL THERAPY | Age: 47
Discharge: HOME | End: 2022-06-10
Payer: MEDICARE

## 2022-06-10 PROCEDURE — 97110 THERAPEUTIC EXERCISES: CPT | Performed by: PHYSICAL THERAPIST

## 2022-06-10 PROCEDURE — 97112 NEUROMUSCULAR REEDUCATION: CPT | Performed by: PHYSICAL THERAPIST

## 2022-06-10 NOTE — PROGRESS NOTES
[address]    OUTPATIENT PHYSICAL THERAPY DAILY TREATMENT NOTE  VISIT: 2    NAME: Mady Dailey AGE: 55 y.o. GENDER: male  DATE: 6/10/2022  REFERRING PHYSICIAN: Rocío Burch., *    GOALS  Short term goals  Time frame: 4 weeks  1. Patient will be compliant and independent with the initial HEP as evidenced by being able to perform without cuing. 2. Patient will report a 50% improvement in symptoms. 3. Patient report a 50% improvement in sleeping. 4. Patient will have a 25% degree increase in lumbar extnesion ROM to allow improved standing. 5. Patient will have an increased tolerance for standing to allow 30 minutes of the activity before symptoms start. 6. Patient will tolerate 30 minutes of clinic activities before onset of symptoms.      Long term goals  Time frame: 8 weeks  1. Patient will report pain level decrease to 2/10 to allow increased ease of movement. 2. Patient will have an improved score on the LEFS outcome measure by 10 points to demonstrate an increase in functional activity tolerance. 3. Patient will be independent in final individualized HEP. 4. Patient will have an increase in lumbar rotation ROM to less than 50% limited to allow performance of walking tasks. 5. Patient will have an increase in knee extnesion strength to 4+/5 to allow performance of standing and walking tasks. 6. Patient will improve Barillas Balance Score by 8 point to show a decreased fall risk       SUBJECTIVE:   \"I need new band, my girl thought it was for [facility]. \"    Pain In: 7/10 both legs  OBJECTIVE DATA SUMMARY:   EXAMINATION/PRESENTATION/DECISION MAKING:   Pain:  Location: LBP with pain extending into groin and posterior leg into ankle  Quality: aching and tingling  Now: 8/10  Best: 7/10  Worst: 10/10  Easing Factors: medication: with success  Aggravating Factors: walking, standing     Strength: LEFT                  RIGHT  Hip flexion                       3+/5                   3/5  Hip abduction                 4/5                     4/5  Hip extension                  3+/5                   3+/5  Knee flexion                    4/5                     3+/5  Knee extension               3/5                     3/5          Ankle DF                         3/5                     3/5  Ankle PF                         2/5                     2/5      Range of Motion:   LE AROM generally decreased, within functional limits     Spinal Assessment:   Lumbar Spine (AROM)  Flexion               50% limited  Extension*         75% limited  R side bend*      75% limited  L side bend*      75% limited  R rotation           100% limited with loss of balance  L rotation           100% limited with loss of balance      Neurologic Assessment:  Sensation: light touch intact with diminished response in BL feet     Mobility:  Transitional Movements: slow, antalgic, use of SPC  Gait: forward flexed, slowed, antalgic, use of SPC     Balance:    Barillas Balance Test:     Sitting to Standin  Standing Unsupported: 3  Sitting with Back Unsupported: 4  Standing to Sittin  Transfers: 2  Standing Unsupported with Eyes Closed: 1  Standing Unsupported with Feet Together: 1  Reach Forward with Outstretched Arm: 1   Object: 1  Turn to Look Over Shoulders: 1  Turn 360 Degrees: 0  Alternate Foot on Step/Stool: 0  Standing Unsupported One Foot in Front: 0  Stand on One Le  Total: 16      56=Maximum possible score;   0-20=High fall risk  21-40=Moderate fall risk   41-56=Low fall risk      Functional Measure:   LEFS: 24/80; 70% impaired     TREATMENT/INTERVENTION:  --Interventions in BOLD performed today--     Recumbent bike L1 5min    Modalities (Rationale): none today     Therapeutic Exercises: to develop strength, endurance, range of motion, and flexibility  Sitting:  LAQ 2x 5 each leg  Hip ABD RTB 2x 10  Hip ADD squeeze 2x 10 3\" hold  Ankle DF RTB 10x each side    Standing:  Heel raises 2x 10  Hip ABD 2x 10     Manual Therapy: for joint mobilization/manipulations and soft tissue mobilization  None today     Neuro Re-Education: to improve movement, balance, coordination, kinesthetic sense, posture, and proprioception for sitting or standing balance  NBOS 3x 15\" hold  Normal stance EC 3x 15\" hold  Normal stance EO with perturbations and cues for decreasing posterior weight shift     Therapeutic Activities: to improve functional performance, power, or agility  None today     Ambulation/Gait Training:  None today    Activity tolerance and post treatment pain report:   Fair  Pain Out: 9/10 \"my legs are really sore, you got me today\"    Education:  Education was provided to the patient on the following topics: Muscle soreness vs pain. [x]    No changes were made to the home exercise program.  []    The following changes were made to the home exercise program:   Access Code: [email or web address]  URL: [email or web address]  Date: 06/03/2022  Prepared by: Sin Han  Exercises  Seated Long Arc Quad - 2 x daily - 10 reps  Seated Isometric Hip Adduction with Pelvic Floor Contraction - 2 x daily - 10 reps - 3sec hold  Seated Hip Abduction with Resistance - 2 x daily - 10 reps - 3sec hold  CLX Ankle Dorsiflexion and Eversion - 2 x daily - 10 reps     Patient verbalized understanding of the topics presented. ASSESSMENT:   Patient showed fair response to treatment with increase in LE soreness. Patient continues to show significant balance and LE strength deficits that are contributing to functional limitations in walking and standing tolerance and causing an increased fall risk. Patient will continue to benefit from skilled Physical Therapy intervention to address listed impairments to allow for improved functional independence and decreased fall risk.     Patients progression toward goals is as follows:  [x]     Improving appropriately and progressing toward goals  []     Improving slowly and progressing toward goals  []     Not making progress toward goals and plan of care will be adjusted    PLAN OF CARE:   Patient continues to benefit from skilled intervention to address the above impairments. [x]    Continue treatment per established plan of care.   []     Recommend the following changes to the plan of care:     Recommendations/Intent for next treatment: progress standing and cardiovascular exercise as patient mariela Parnell, PT   Time Calculation: 28 mins  Patient Time in clinic:   Start Time: 1054   Stop Time: [id number]

## 2022-06-17 ENCOUNTER — HOSPITAL ENCOUNTER (OUTPATIENT)
Dept: PHYSICAL THERAPY | Age: 47
Discharge: HOME | End: 2022-06-17
Payer: MEDICARE

## 2022-06-17 NOTE — PROGRESS NOTES
[facility]  [address], [address] [facility]    OUTPATIENT PHYSICAL THERAPY      6/17/2022:  Kayden Last was not seen on this date for physical therapy for the following reasons:    [x]     Patient called to cancel the visit for the following reasons: schedule conflict  []     Patient missed the visit and did not call to cancel.     Carline Stockton, PT

## 2022-06-24 ENCOUNTER — HOSPITAL ENCOUNTER (OUTPATIENT)
Dept: PHYSICAL THERAPY | Age: 47
Discharge: HOME | End: 2022-06-24
Payer: MEDICARE

## 2022-06-24 NOTE — PROGRESS NOTES
[facility]  [address], [address] [facility]    OUTPATIENT PHYSICAL THERAPY      6/24/2022:  Gurjit Reed was not seen on this date for physical therapy for the following reasons:    [x]     Patient called to cancel the visit for the following reasons: schedule conflict  []     Patient missed the visit and did not call to cancel.     Judie Cordero, PT

## 2022-07-21 ENCOUNTER — APPOINTMENT (OUTPATIENT)
Dept: GENERAL RADIOLOGY | Age: 47
End: 2022-07-21
Attending: EMERGENCY MEDICINE
Payer: MEDICARE

## 2022-07-21 ENCOUNTER — HOSPITAL ENCOUNTER (EMERGENCY)
Age: 47
Discharge: HOME | End: 2022-07-21
Attending: EMERGENCY MEDICINE
Payer: MEDICARE

## 2022-07-21 VITALS
OXYGEN SATURATION: 96 % | HEART RATE: 85 BPM | TEMPERATURE: 98.1 F | HEIGHT: 70 IN | BODY MASS INDEX: 42.23 KG/M2 | DIASTOLIC BLOOD PRESSURE: 99 MMHG | WEIGHT: 295 LBS | SYSTOLIC BLOOD PRESSURE: 141 MMHG | RESPIRATION RATE: 16 BRPM

## 2022-07-21 DIAGNOSIS — M77.8 THUMB TENDONITIS: Primary | ICD-10-CM

## 2022-07-21 PROCEDURE — 99283 EMERGENCY DEPT VISIT LOW MDM: CPT

## 2022-07-21 PROCEDURE — 73140 X-RAY EXAM OF FINGER(S): CPT

## 2022-07-21 PROCEDURE — 75810000053 HC SPLINT APPLICATION

## 2022-07-21 RX ORDER — DICLOFENAC SODIUM 10 MG/G
4 GEL TOPICAL 4 TIMES DAILY
Qty: 100 G | Refills: 0 | Status: SHIPPED | OUTPATIENT
Start: 2022-07-21

## 2022-07-21 RX ORDER — PREDNISONE 20 MG/1
60 TABLET ORAL DAILY
Qty: 15 TABLET | Refills: 0 | Status: SHIPPED | OUTPATIENT
Start: 2022-07-21 | End: 2022-07-26

## 2022-07-21 NOTE — ED PROVIDER NOTES
EMERGENCY DEPARTMENT HISTORY AND PHYSICAL EXAM      Date: 7/21/2022  Patient Name: Darrin Tinajero    History of Presenting Illness     Chief Complaint   Patient presents with    Thumb Pain     History Provided By: Patient    HPI: Darrin Tinajero, 55 y.o. male with past medical history significant for hypertension, GERD, tonsillar cancer, migraine headaches, CKD, and chronic pain who presents via private vehicle to the ED with cc of atraumatic left thumb pain that started last night around dinnertime. Patient states he was putting together a bedside table when the symptoms started. He denies any falls or injuries. His pain is worse with loadbearing and immobilization makes the pain better. He tells me the pain is also worse with flexion and extension of the metacarpophalangeal joint but lateral movement does not make the pain any worse. He tried soaking it in Epson salt without any improvement. PMHx: Hypertension, GERD, tonsillar cancer, migraine headaches, CKD, and chronic pain  Social Hx: Former smoker, denies alcohol use, occasional marijuana use    PCP: Lord Hernandez, NP    There are no other complaints, changes, or physical findings at this time. No current facility-administered medications on file prior to encounter. Current Outpatient Medications on File Prior to Encounter   Medication Sig Dispense Refill    amLODIPine (NORVASC) 5 mg tablet Take 1 Tablet by mouth daily. For blood pressure 90 Tablet 0    acetaminophen (TYLENOL) 325 mg tablet Take 2 Tablets by mouth every four (4) hours as needed for Pain. 20 Tablet 0    methocarbamoL (ROBAXIN) 500 mg tablet Take 1 Tablet by mouth three (3) times daily as needed for Muscle Spasm(s). 40 Tablet 0    amitriptyline (ELAVIL) 50 mg tablet Take 1 Tablet by mouth.       ARIPiprazole (ABILIFY) 5 mg tablet aripiprazole 5 mg tablet      ARIPiprazole (ABILIFY) 10 mg tablet aripiprazole 10 mg tablet      DULoxetine (CYMBALTA) 60 mg capsule duloxetine 60 mg capsule,delayed release      esomeprazole (NEXIUM) 40 mg capsule Take 1 Capsule by mouth daily. hydrOXYzine pamoate (VISTARIL) 50 mg capsule hydroxyzine pamoate 50 mg capsule      naloxone (NARCAN) 4 mg/actuation nasal spray 4 mg by Nasal route as needed. onabotulinumtoxinA (BOTOX) 100 unit injection 100 Units by IntraMUSCular route once. traZODone (DESYREL) 100 mg tablet trazodone 100 mg tablet      fentaNYL (DURAGESIC) 12 mcg/hr patch fentanyl 12 mcg/hr transdermal patch      fentaNYL (DURAGESIC) 25 mcg/hr PATCH fentanyl 25 mcg/hr transdermal patch      ondansetron (ZOFRAN ODT) 4 mg disintegrating tablet Take 1 Tab by mouth every eight (8) hours as needed for Nausea or Vomiting. 30 Tab 1    gabapentin (NEURONTIN) 300 mg capsule       cetirizine (ZYRTEC) 10 mg tablet TAKE 1 TO 2 TABLETS BY MOUTH ONCE OR TWICE A DAY. START BY TAKING AT NIGHT. IF TOLERATED, TAKE MORNING AND NIGHT. polyethylene glycol (MIRALAX) 17 gram packet Take 1 Packet by mouth daily. 30 Packet 1    aluminum-magnesium hydroxide 200-200 mg/5 mL susp 30 mL, diphenhydrAMINE 12.5 mg/5 mL elix 75 mg, lidocaine 2 % soln 30 mL, sucralfate 100 mg/mL susp 3 g oral solution (compounded) Take 5 mL by mouth four (4) times daily. 1 Bottle 1    fentaNYL (DURAGESIC) 100 mcg/hr PATCH 1 Patch by TransDERmal route every seventy-two (72) hours. Max Daily Amount: 1 Patch. Indications: SEVERE PAIN WITH OPIOID TOLERANCE 5 Patch 0    nystatin (MYCOSTATIN) 100,000 unit/mL suspension Take 5 mL by mouth four (4) times daily. swish and spit  Indications: oral candidiasis 100 mL 0    lidocaine (XYLOCAINE) 2 % solution Take 15 mL by mouth as needed for Pain.  1 Bottle 1     Past History     Past Medical History:  Past Medical History:   Diagnosis Date    Tonsillar abscess 05/2018    Tonsillar cancer ([facility] [address]     dx 6/2018     Past Surgical History:  Past Surgical History:   Procedure Laterality Date    HX HERNIA REPAIR       Family History:  History reviewed. No pertinent family history. Social History:  Social History     Tobacco Use    Smoking status: Former     Packs/day: 0.50     Types: Cigarettes     Quit date: 2017     Years since quittin.0    Smokeless tobacco: Never   Vaping Use    Vaping Use: Never used   Substance Use Topics    Alcohol use: No    Drug use: Yes     Types: Marijuana     Allergies: Allergies   Allergen Reactions    Nsaids (Non-Steroidal Anti-Inflammatory Drug) Swelling    Aspirin Angioedema    Ibuprofen Angioedema    Naproxen Angioedema    Fish Oil Unknown (comments)     Review of Systems   Review of Systems   Constitutional:  Negative for chills and fever. HENT:  Negative for congestion, rhinorrhea, sneezing and sore throat. Eyes:  Negative for redness and visual disturbance. Respiratory:  Negative for shortness of breath. Cardiovascular:  Negative for chest pain and leg swelling. Gastrointestinal:  Negative for abdominal pain, nausea and vomiting. Genitourinary:  Negative for difficulty urinating and frequency. Musculoskeletal:  Positive for arthralgias. Negative for back pain, myalgias and neck stiffness. Skin:  Negative for rash. Neurological:  Negative for dizziness, syncope, weakness and headaches. Hematological:  Negative for adenopathy. All other systems reviewed and are negative. Physical Exam   Physical Exam  Vitals and nursing note reviewed. Constitutional:       Appearance: Normal appearance. He is well-developed. HENT:      Head: Normocephalic and atraumatic. Cardiovascular:      Rate and Rhythm: Normal rate and regular rhythm. Pulses: Normal pulses. Heart sounds: Normal heart sounds. Pulmonary:      Effort: Pulmonary effort is normal. No respiratory distress. Breath sounds: Normal breath sounds. Chest:      Chest wall: No tenderness. Abdominal:      General: Bowel sounds are normal.      Palpations: Abdomen is soft. Tenderness:  There is no abdominal tenderness. There is no guarding or rebound. Musculoskeletal:      Right hand: Normal.        Arms:       Cervical back: Full passive range of motion without pain, normal range of motion and neck supple. Skin:     General: Skin is warm and dry. Findings: No erythema or rash. Neurological:      Mental Status: He is alert and oriented to person, place, and time. Psychiatric:         Speech: Speech normal.         Behavior: Behavior normal.         Thought Content: Thought content normal.         Judgment: Judgment normal.     Diagnostic Study Results   Labs -   No results found for this or any previous visit (from the past 12 hour(s)). Radiologic Studies -   XR THUMB LT MIN 2 V   Final Result   No acute abnormality. .        XR THUMB LT MIN 2 V    Result Date: 7/21/2022  No acute abnormality. .   Medical Decision Making   I am the first provider for this patient. I reviewed the vital signs, available nursing notes, past medical history, past surgical history, family history and social history. Vital Signs-Reviewed the patient's vital signs. Patient Vitals for the past 24 hrs:   Temp Pulse Resp BP SpO2   07/21/22 0645 98.1 °F (36.7 °C) 85 16 (!) 141/99 96 %     Pulse Oximetry Analysis - 96% on RA (normal)    Records Reviewed: Nursing Notes and Old Medical Records    Provider Notes (Medical Decision Making):   51-year-old male presents with atraumatic left thumb pain that started last night. Differential includes tendinitis, osteoarthritis, gout, and low suspicion for fracture or dislocation. Will x-ray the thumb to rule out acute Ortho etiologies and treat with immobilization and steroids. ED Course:   Initial assessment performed. The patients presenting problems have been discussed, and they are in agreement with the care plan formulated and outlined with them. I have encouraged them to ask questions as they arise throughout their visit. X-rays negative.   Will place him in a thumb spica and discharged with a prescription for prednisone for possible arthritis versus tendinitis and Voltaren gel. Patient agrees to follow-up with primary care and orthopedics as needed. Progress Note:   Updated pt on all returned results and findings. Discussed the importance of proper follow up as referred below along with return precautions. Pt in agreement with the care plan and expresses agreement with and understanding of all items discussed. Disposition:  Discharge Note:  The pt is ready for discharge. The pt's signs, symptoms, diagnosis, and discharge instructions have been discussed and pt has conveyed their understanding. The pt is to follow up as recommended or return to ER should their symptoms worsen. Plan has been discussed and pt is in agreement. PLAN:  1. Discharge Medication List as of 7/21/2022  7:56 AM        START taking these medications    Details   predniSONE (DELTASONE) 20 mg tablet Take 3 Tablets by mouth in the morning for 5 days. , Normal, Disp-15 Tablet, R-0      diclofenac (Voltaren) 1 % gel Apply 4 g to affected area four (4) times daily. , Normal, Disp-100 g, R-0           CONTINUE these medications which have NOT CHANGED    Details   amLODIPine (NORVASC) 5 mg tablet Take 1 Tablet by mouth daily. For blood pressure, Normal, Disp-90 Tablet, R-0      acetaminophen (TYLENOL) 325 mg tablet Take 2 Tablets by mouth every four (4) hours as needed for Pain., Normal, Disp-20 Tablet, R-0      methocarbamoL (ROBAXIN) 500 mg tablet Take 1 Tablet by mouth three (3) times daily as needed for Muscle Spasm(s). , Normal, Disp-40 Tablet, R-0      amitriptyline (ELAVIL) 50 mg tablet Take 1 Tablet by mouth., Historical Med      !! ARIPiprazole (ABILIFY) 10 mg tablet aripiprazole 10 mg tablet, Historical Med      DULoxetine (CYMBALTA) 60 mg capsule duloxetine 60 mg capsule,delayed release, Historical Med      esomeprazole (NEXIUM) 40 mg capsule Take 1 Capsule by mouth daily. , Historical Med hydrOXYzine pamoate (VISTARIL) 50 mg capsule hydroxyzine pamoate 50 mg capsule, Historical Med      onabotulinumtoxinA (BOTOX) 100 unit injection 100 Units by IntraMUSCular route once., Historical Med      traZODone (DESYREL) 100 mg tablet trazodone 100 mg tablet, Historical Med      ondansetron (ZOFRAN ODT) 4 mg disintegrating tablet Take 1 Tab by mouth every eight (8) hours as needed for Nausea or Vomiting., Normal, Disp-30 Tab, R-1      gabapentin (NEURONTIN) 300 mg capsule Historical Med      cetirizine (ZYRTEC) 10 mg tablet TAKE 1 TO 2 TABLETS BY MOUTH ONCE OR TWICE A DAY. START BY TAKING AT NIGHT. IF TOLERATED, TAKE MORNING AND NIGHT., Historical Med      polyethylene glycol (MIRALAX) 17 gram packet Take 1 Packet by mouth daily. , Normal, Disp-30 Packet, R-1      fentaNYL (DURAGESIC) 100 mcg/hr PATCH 1 Patch by TransDERmal route every seventy-two (72) hours. Max Daily Amount: 1 Patch. Indications: SEVERE PAIN WITH OPIOID TOLERANCE, Print, Disp-5 Patch, R-0      nystatin (MYCOSTATIN) 100,000 unit/mL suspension Take 5 mL by mouth four (4) times daily. swish and spit  Indications: oral candidiasis, Print, Disp-100 mL, R-0      lidocaine (XYLOCAINE) 2 % solution Take 15 mL by mouth as needed for Pain., Print, Disp-1 Bottle, R-1      !! ARIPiprazole (ABILIFY) 5 mg tablet aripiprazole 5 mg tablet, Historical Med      naloxone (NARCAN) 4 mg/actuation nasal spray 4 mg by Nasal route as needed., Historical Med      fentaNYL (DURAGESIC) 12 mcg/hr patch fentanyl 12 mcg/hr transdermal patch, Historical Med      fentaNYL (DURAGESIC) 25 mcg/hr PATCH fentanyl 25 mcg/hr transdermal patch, Historical Med      aluminum-magnesium hydroxide 200-200 mg/5 mL susp 30 mL, diphenhydrAMINE 12.5 mg/5 mL elix 75 mg, lidocaine 2 % soln 30 mL, sucralfate 100 mg/mL susp 3 g oral solution (compounded) Take 5 mL by mouth four (4) times daily. , Print, Disp-1 Bottle, R-1       !! - Potential duplicate medications found.  Please discuss with provider. 2.   Follow-up Information       Follow up With Specialties Details Why Contact Info    Mikayla Benson., NP Nurse Practitioner Schedule an appointment as soon as possible for a visit   [place]  [address] [place] [address], DO Orthopedic Surgery Schedule an appointment as soon as possible for a visit   [address] [facility]  [address] [phone]            Return to ED if worse     Diagnosis     Clinical Impression:   1. Thumb tendonitis            Please note that this dictation was completed with Dragon, computer voice recognition software. Quite often unanticipated grammatical, syntax, homophones, and other interpretive errors are inadvertently transcribed by the computer software. Please disregard these errors. Additionally, please excuse any errors that have escaped final proofreading.

## 2022-07-21 NOTE — ED NOTES
Bedside and Verbal shift change [facility] by Robyn Washburn RN (offgoing nurse). Report included the following information SBAR.

## 2022-07-21 NOTE — ED NOTES
Emergency Department Nursing Plan of Care       The Nursing Plan of Care is developed from the Nursing assessment and Emergency Department Attending provider initial evaluation. The plan of care may be reviewed in the ED Provider note.     The Plan of Care was developed with the following considerations:   Patient / Family readiness to learn indicated by:verbalized understanding  Persons(s) to be included in education: patient  Barriers to Learning/Limitations:No    Signed     Xavier Patel RN    7/21/2022   7:08 AM

## 2022-07-21 NOTE — ED NOTES
Patient  given copy of dc instructions and script(s). Patient verbalized understanding of instructions and script (s). Patient given a current medication reconciliation form and verbalized understanding of their medications. Patient verbalized understanding of the importance of discussing medications with  his or her physician or clinic they will be following up with. Patient alert and oriented and in no acute distress. Patient discharged home ambulatory with left wrist/thumb splint.

## 2022-07-21 NOTE — ED TRIAGE NOTES
Pt reports left thumb pain x 7pm yesterday. Pt denies injury or trauma to the area. Pt reports soaking his hand in warm water and epsom salt w/o relief. Pt denies taking any medication.

## 2022-07-21 NOTE — Clinical Note
[address]  [facility] EMERGENCY DEPT  [address] [facility] [id number] [phone]    Work/School Note    Date: 7/21/2022    To Whom It May concern:    Ras Moreno was seen and treated today in the emergency room by the following provider(s):  Attending Provider: Rajeev Nye MD.      Ras Moreno is excused from work/school on 07/21/22 and 07/22/22. He is medically clear to return to work/school on 7/23/2022.        Sincerely,          Mary August MD

## 2022-07-21 NOTE — Clinical Note
[facility] EMERGENCY DEPT  [address] [facility] [address]  [phone]    Work/School Note    Date: 7/21/2022    To Whom It May concern:      Rodrigo Soler was seen and treated today in the emergency room by the following provider(s):  Attending Provider: Chelsea Santoyo MD.      Rodrigo Soler is excused from work/school on 07/21/22. He is clear to return to work/school on 07/22/22.         Sincerely,          Jason Ricketts MD

## 2022-08-12 NOTE — PROGRESS NOTES
[facility]  [address], [address] [facility]    OUTPATIENT physical Therapy discharge note      8/12/2022:  Patient will be discharged from physical therapy at this time. Criteria for termination of care:    []           Patient has plateaued  [x]           Patient has not returned to therapy  []           Patient has missed three or more visits without prior notification  []           Other    Patient was seen for 2 visits from 6/3/22 to 6/10/22. Please refer to the most recent progress note for the latest PT info available. If you need anything further faxed to you, please contact us at [phone].     Thank you for this referral.  Maty Morales, PT

## 2022-08-18 ENCOUNTER — OFFICE VISIT (OUTPATIENT)
Dept: INTERNAL MEDICINE CLINIC | Age: 47
End: 2022-08-18
Payer: MEDICARE

## 2022-08-18 VITALS
BODY MASS INDEX: 43.32 KG/M2 | SYSTOLIC BLOOD PRESSURE: 140 MMHG | OXYGEN SATURATION: 98 % | RESPIRATION RATE: 16 BRPM | HEART RATE: 79 BPM | DIASTOLIC BLOOD PRESSURE: 86 MMHG | WEIGHT: 302.6 LBS | HEIGHT: 70 IN | TEMPERATURE: 98.2 F

## 2022-08-18 DIAGNOSIS — E78.2 MIXED HYPERLIPIDEMIA: ICD-10-CM

## 2022-08-18 DIAGNOSIS — I10 ESSENTIAL HYPERTENSION: Primary | ICD-10-CM

## 2022-08-18 DIAGNOSIS — C05.9 SQUAMOUS CELL CARCINOMA OF PALATE (HCC): ICD-10-CM

## 2022-08-18 DIAGNOSIS — N52.9 ERECTILE DYSFUNCTION, UNSPECIFIED ERECTILE DYSFUNCTION TYPE: ICD-10-CM

## 2022-08-18 DIAGNOSIS — R29.818 SUSPECTED SLEEP APNEA: ICD-10-CM

## 2022-08-18 DIAGNOSIS — R35.1 NOCTURIA: ICD-10-CM

## 2022-08-18 PROCEDURE — G8417 CALC BMI ABV UP PARAM F/U: HCPCS | Performed by: NURSE PRACTITIONER

## 2022-08-18 PROCEDURE — G8753 SYS BP > OR = 140: HCPCS | Performed by: NURSE PRACTITIONER

## 2022-08-18 PROCEDURE — G8432 DEP SCR NOT DOC, RNG: HCPCS | Performed by: NURSE PRACTITIONER

## 2022-08-18 PROCEDURE — 99214 OFFICE O/P EST MOD 30 MIN: CPT | Performed by: NURSE PRACTITIONER

## 2022-08-18 PROCEDURE — G8754 DIAS BP LESS 90: HCPCS | Performed by: NURSE PRACTITIONER

## 2022-08-18 PROCEDURE — G8427 DOCREV CUR MEDS BY ELIG CLIN: HCPCS | Performed by: NURSE PRACTITIONER

## 2022-08-18 RX ORDER — SILDENAFIL 25 MG/1
25 TABLET, FILM COATED ORAL
Qty: 20 TABLET | Refills: 1 | Status: SHIPPED | OUTPATIENT
Start: 2022-08-18

## 2022-08-18 RX ORDER — AMLODIPINE BESYLATE 10 MG/1
10 TABLET ORAL DAILY
Qty: 90 TABLET | Refills: 0 | Status: SHIPPED | OUTPATIENT
Start: 2022-08-18

## 2022-08-18 NOTE — PROGRESS NOTES
Subjective: (As above and below)     Chief Complaint   Patient presents with    Follow-up     6 month follow up       Gurjit Reed is a 55y.o. 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  Chronic opiates: denies constipation w/ /mirilax    Migraines: sees neuro    PTSD: sees psych reports doing well \"if I take my meds\"    Hypertension ROS:  taking medications as instructed, no medication side effects noted, no TIAs, no chest pain on exertion, no dyspnea on exertion, no swelling of ankles  Hyperlipidemia    Prostatic s/s occ night voiding and weakened stream    Suspected sleep apnea: some snoring, unknown witnessed apnea  +headaches    Erectile dysfunction: difficulty maintaining erection        Wt Readings from Last 3 Encounters:   22 302 lb 9.6 oz (137.3 kg)   22 295 lb (133.8 kg)   22 295 lb (133.8 kg)             Reviewed PmHx, RxHx, FmHx, SocHx, AllgHx and updated in chart. History reviewed. No pertinent family history. Past Medical History:   Diagnosis Date    Tonsillar abscess 2018    Tonsillar cancer ([facility] [address]     dx 2018      Social History     Socioeconomic History    Marital status: SINGLE   Tobacco Use    Smoking status: Former     Packs/day: 0.50     Types: Cigarettes     Quit date: 2017     Years since quittin.1    Smokeless tobacco: Never   Vaping Use    Vaping Use: Never used   Substance and Sexual Activity    Alcohol use: No    Drug use: Yes     Types: Marijuana    Sexual activity: Yes     Partners: Female     Birth control/protection: None          Current Outpatient Medications   Medication Sig    sildenafil citrate (VIAGRA) 25 mg tablet Take 1 Tablet by mouth daily as needed for Erectile Dysfunction. amLODIPine (NORVASC) 10 mg tablet Take 1 Tablet by mouth daily. For blood pressure    diclofenac (Voltaren) 1 % gel Apply 4 g to affected area four (4) times daily.     acetaminophen (TYLENOL) 325 mg tablet Take 2 Tablets by mouth every four (4) hours as needed for Pain. methocarbamoL (ROBAXIN) 500 mg tablet Take 1 Tablet by mouth three (3) times daily as needed for Muscle Spasm(s). amitriptyline (ELAVIL) 50 mg tablet Take 1 Tablet by mouth. ARIPiprazole (ABILIFY) 5 mg tablet aripiprazole 5 mg tablet    ARIPiprazole (ABILIFY) 10 mg tablet aripiprazole 10 mg tablet    DULoxetine (CYMBALTA) 60 mg capsule duloxetine 60 mg capsule,delayed release    esomeprazole (NEXIUM) 40 mg capsule Take 1 Capsule by mouth daily. hydrOXYzine pamoate (VISTARIL) 50 mg capsule hydroxyzine pamoate 50 mg capsule    naloxone (NARCAN) 4 mg/actuation nasal spray 4 mg by Nasal route as needed. onabotulinumtoxinA (BOTOX) 100 unit injection 100 Units by IntraMUSCular route once. traZODone (DESYREL) 100 mg tablet trazodone 100 mg tablet    fentaNYL (DURAGESIC) 25 mcg/hr PATCH fentanyl 25 mcg/hr transdermal patch    ondansetron (ZOFRAN ODT) 4 mg disintegrating tablet Take 1 Tab by mouth every eight (8) hours as needed for Nausea or Vomiting.    gabapentin (NEURONTIN) 300 mg capsule     cetirizine (ZYRTEC) 10 mg tablet TAKE 1 TO 2 TABLETS BY MOUTH ONCE OR TWICE A DAY. START BY TAKING AT NIGHT. IF TOLERATED, TAKE MORNING AND NIGHT. polyethylene glycol (MIRALAX) 17 gram packet Take 1 Packet by mouth daily. aluminum-magnesium hydroxide 200-200 mg/5 mL susp 30 mL, diphenhydrAMINE 12.5 mg/5 mL elix 75 mg, lidocaine 2 % soln 30 mL, sucralfate 100 mg/mL susp 3 g oral solution (compounded) Take 5 mL by mouth four (4) times daily. nystatin (MYCOSTATIN) 100,000 unit/mL suspension Take 5 mL by mouth four (4) times daily. swish and spit  Indications: oral candidiasis    lidocaine (XYLOCAINE) 2 % solution Take 15 mL by mouth as needed for Pain. No current facility-administered medications for this visit. Review of Systems:   Constitutional:    Negative for fever and chills, negative diaphoresis.    HEENT: Negative for neck pain and stiffness. Eyes:                  Negative for visual disturbance, itching, redness or discharge. Respiratory:        Negative for cough and shortness of breath. Cardiovascular:  Negative for chest pain and palpitations. Gastrointestinal: Negative for nausea, vomiting, abdominal pain, diarrhea or constipation. Genitourinary:     Negative for dysuria and frequency. Musculoskeletal: Negative for falls, tenderness and swelling. Skin:                    Negative for rash, masses or lesions. Neurological:       Negative for dizzyness, seizure, loss of consciousness, weakness and numbness. Objective:     Vitals:    08/18/22 1000 08/18/22 1020   BP: (!) 143/90 (!) 140/86   Pulse: 78 79   Resp: 16    Temp: 98.2 °F (36.8 °C)    SpO2: 98%    Weight: 302 lb 9.6 oz (137.3 kg)    Height: 5' 10\" (1.778 m)        Gen: Oriented to person, place and time and well-developed, well-nourished and in no distress. HEENT:    Head: normocephalic and atraumatic. Eyes:  EOM are normal. Pupils equal and round. Neck:  Normal range of motion. Neck supple. Cardiovascular: normal rate, regular rhythm and normal heart sounds. Pulmonary/Chest:  Effort normal and breath sounds normal.  No respiratory distress. No wheezes, no rales. Abdominal: soft, normal  bowel sounds. Musculoskeletal:  No edema, no tenderness. No calf tenderness or edema. Neurological:  Alert, oriented to person, place and time. Skin: skin is warm and dry. Assessment/ Plan:     Follow-up and Dispositions    Return for 3 week nv bp check then 6 mo w/ me.         1. Essential hypertension  Dose increase  - METABOLIC PANEL, COMPREHENSIVE; Future  - CBC W/O DIFF; Future  - LIPID PANEL; Future  - METABOLIC PANEL, COMPREHENSIVE  - CBC W/O DIFF  - LIPID PANEL  - amLODIPine (NORVASC) 10 mg tablet; Take 1 Tablet by mouth daily. For blood pressure  Dispense: 90 Tablet; Refill: 0    2.  Squamous cell carcinoma of palate ([facility] [address]      3. Mixed hyperlipidemia      4. Suspected sleep apnea    - SLEEP MEDICINE REFERRAL    5. Erectile dysfunction, unspecified erectile dysfunction type  Reviewed medication  - sildenafil citrate (VIAGRA) 25 mg tablet; Take 1 Tablet by mouth daily as needed for Erectile Dysfunction. Dispense: 20 Tablet; Refill: 1    6. Nocturia    - PSA W/ REFLX FREE PSA; Future  - PSA W/ REFLX FREE PSA      I have discussed the diagnosis with the patient and the intended plan as seen in the above orders. The patient has received an after-visit summary and questions were answered concerning future plans. Pt conveyed understanding of plan. Medication Side Effects and Warnings were discussed with patient: yes  Patient Labs were reviewed: yes  Patient Past Records were reviewed:  yes    Ammy Vaughan.  Leighton Pickard NP

## 2022-08-18 NOTE — PROGRESS NOTES
Gurjit Reed is a 55 y.o. male    Chief Complaint   Patient presents with    Follow-up     6 month follow up       Visit Vitals  BP (!) 143/90   Pulse 78   Temp 98.2 °F (36.8 °C)   Resp 16   Ht 5' 10\" (1.778 m)   Wt 302 lb 9.6 oz (137.3 kg)   SpO2 98%   BMI 43.42 kg/m²           1. Have you been to the ER, urgent care clinic since your last visit? Hospitalized since your last visit? NO    2. Have you seen or consulted any other health care providers outside of the [address] [place] [address] since your last visit? Include any pap smears or colon screening.  NO

## 2022-08-19 LAB
ALBUMIN SERPL-MCNC: 4.2 G/DL (ref 4–5)
ALBUMIN/GLOB SERPL: 1.5 {RATIO} (ref 1.2–2.2)
ALP SERPL-CCNC: 70 IU/L (ref 44–121)
ALT SERPL-CCNC: 15 IU/L (ref 0–44)
AST SERPL-CCNC: 17 IU/L (ref 0–40)
BILIRUB SERPL-MCNC: <0.2 MG/DL (ref 0–1.2)
BUN SERPL-MCNC: 15 MG/DL (ref 6–24)
BUN/CREAT SERPL: 11 (ref 9–20)
CALCIUM SERPL-MCNC: 9.4 MG/DL (ref 8.7–10.2)
CHLORIDE SERPL-SCNC: 104 MMOL/L (ref 96–106)
CHOLEST SERPL-MCNC: 159 MG/DL (ref 100–199)
CO2 SERPL-SCNC: 20 MMOL/L (ref 20–29)
CREAT SERPL-MCNC: 1.33 MG/DL (ref 0.76–1.27)
EGFR: 67 ML/MIN/1.73
ERYTHROCYTE [DISTWIDTH] IN BLOOD BY AUTOMATED COUNT: 13.6 % (ref 11.6–15.4)
GLOBULIN SER CALC-MCNC: 2.8 G/DL (ref 1.5–4.5)
GLUCOSE SERPL-MCNC: 95 MG/DL (ref 65–99)
HCT VFR BLD AUTO: 39.8 % (ref 37.5–51)
HDLC SERPL-MCNC: 37 MG/DL
HGB BLD-MCNC: 13.7 G/DL (ref 13–17.7)
IMP & REVIEW OF LAB RESULTS: NORMAL
LDLC SERPL CALC-MCNC: 69 MG/DL (ref 0–99)
MCH RBC QN AUTO: 30.1 PG (ref 26.6–33)
MCHC RBC AUTO-ENTMCNC: 34.4 G/DL (ref 31.5–35.7)
MCV RBC AUTO: 88 FL (ref 79–97)
PLATELET # BLD AUTO: 225 X10E3/UL (ref 150–450)
POTASSIUM SERPL-SCNC: 4.1 MMOL/L (ref 3.5–5.2)
PROT SERPL-MCNC: 7 G/DL (ref 6–8.5)
PSA SERPL-MCNC: 0.4 NG/ML (ref 0–4)
RBC # BLD AUTO: 4.55 X10E6/UL (ref 4.14–5.8)
REFLEX CRITERIA: NORMAL
SODIUM SERPL-SCNC: 136 MMOL/L (ref 134–144)
TRIGL SERPL-MCNC: 336 MG/DL (ref 0–149)
VLDLC SERPL CALC-MCNC: 53 MG/DL (ref 5–40)
WBC # BLD AUTO: 7.7 X10E3/UL (ref 3.4–10.8)

## 2022-08-22 DIAGNOSIS — E78.00 HIGH CHOLESTEROL: Primary | ICD-10-CM

## 2022-08-22 RX ORDER — ATORVASTATIN CALCIUM 10 MG/1
10 TABLET, FILM COATED ORAL
Qty: 90 TABLET | Refills: 1 | Status: SHIPPED | OUTPATIENT
Start: 2022-08-22

## 2022-09-12 ENCOUNTER — CLINICAL SUPPORT (OUTPATIENT)
Dept: INTERNAL MEDICINE CLINIC | Age: 47
End: 2022-09-12

## 2022-09-12 VITALS — TEMPERATURE: 98 F | DIASTOLIC BLOOD PRESSURE: 81 MMHG | HEART RATE: 81 BPM | SYSTOLIC BLOOD PRESSURE: 131 MMHG

## 2022-09-12 DIAGNOSIS — I10 ESSENTIAL HYPERTENSION: Primary | ICD-10-CM

## 2022-09-12 NOTE — PROGRESS NOTES
Identified pt with two pt identifiers(name and ). Reviewed record in preparation for visit and have obtained necessary documentation. Chief Complaint   Patient presents with    Blood Pressure Check        Vitals:    22 1037   BP: 131/81   Pulse: 81   Temp: 98 °F (36.7 °C)   TempSrc: Temporal       Health Maintenance Due   Topic    Hepatitis C Screening     COVID-19 Vaccine (1)    Pneumococcal 0-64 years (2 - PCV)    DTaP/Tdap/Td series (2 - Td or Tdap)    Flu Vaccine (1)       1. \"Have you been to the ER, urgent care clinic since your last visit? Hospitalized since your last visit? \" No    2. \"Have you seen or consulted any other health care providers outside of the [address] [place] [address] since your last visit? \" No     3. For patients over 45: Has the patient had a colonoscopy? No     If the patient is female:    4. For patients over 36: Has the patient had a mammogram? NA - based on age    11. For patients over 21: Has the patient had a pap smear? NA - based on age    Current Outpatient Medications   Medication Instructions    acetaminophen (TYLENOL) 650 mg, Oral, EVERY 4 HOURS AS NEEDED    aluminum-magnesium hydroxide 200-200 mg/5 mL susp 30 mL, diphenhydrAMINE 12.5 mg/5 mL elix 75 mg, lidocaine 2 % soln 30 mL, sucralfate 100 mg/mL susp 3 g oral solution (compounded) 5 mL, Oral, 4 TIMES DAILY    amitriptyline (ELAVIL) 50 mg tablet 1 Tablet, Oral    amLODIPine (NORVASC) 10 mg, Oral, DAILY, For blood pressure    ARIPiprazole (ABILIFY) 10 mg tablet aripiprazole 10 mg tablet    ARIPiprazole (ABILIFY) 5 mg tablet aripiprazole 5 mg tablet    atorvastatin (LIPITOR) 10 mg, Oral, EVERY BEDTIME    cetirizine (ZYRTEC) 10 mg tablet TAKE 1 TO 2 TABLETS BY MOUTH ONCE OR TWICE A DAY. START BY TAKING AT NIGHT. IF TOLERATED, TAKE MORNING AND NIGHT.     diclofenac (VOLTAREN) 4 g, Topical, 4 TIMES DAILY    DULoxetine (CYMBALTA) 60 mg capsule duloxetine 60 mg capsule,delayed release    esomeprazole (NEXIUM) 40 mg capsule 1 Capsule, Oral, DAILY    fentaNYL (DURAGESIC) 25 mcg/hr PATCH fentanyl 25 mcg/hr transdermal patch    gabapentin (NEURONTIN) 300 mg capsule No dose, route, or frequency recorded.     hydrOXYzine pamoate (VISTARIL) 50 mg capsule hydroxyzine pamoate 50 mg capsule    lidocaine (XYLOCAINE) 2 % solution 15 mL, Oral, AS NEEDED    methocarbamoL (ROBAXIN) 500 mg, Oral, 3 TIMES DAILY AS NEEDED    naloxone (NARCAN) 4 mg, Nasal, AS NEEDED    nystatin (MYCOSTATIN) 500,000 Units, Oral, 4 TIMES DAILY, swish and spit    onabotulinumtoxinA (BOTOX) 100 Units, IntraMUSCular, ONCE    ondansetron (ZOFRAN ODT) 4 mg, Oral, EVERY 8 HOURS AS NEEDED    polyethylene glycol (MIRALAX) 17 g, Oral, DAILY    sildenafil citrate (VIAGRA) 25 mg, Oral, DAILY AS NEEDED    traZODone (DESYREL) 100 mg tablet trazodone 100 mg tablet       Allergies   Allergen Reactions    Nsaids (Non-Steroidal Anti-Inflammatory Drug) Swelling    Aspirin Angioedema    Ibuprofen Angioedema    Naproxen Angioedema    Fish Oil Unknown (comments)       Immunization History   Administered Date(s) Administered    (RETIRED) Pneumococcal Vaccine (Unspecified Type) 09/25/2012    Influenza Vaccine 11/26/2019    Influenza Vaccine Split 09/25/2012    Influenza, FLUARIX, FLULAVAL, FLUZONE (age 10 mo+) AND AFLURIA, (age 1 y+), PF, 0.5mL 09/17/2018, 02/25/2021, 02/16/2022    Pneumococcal Polysaccharide (PPSV-23) 02/25/2020    TDAP Vaccine 08/03/2012       Past Medical History:   Diagnosis Date    Tonsillar abscess 05/2018    Tonsillar cancer ([facility] [address]     dx 6/2018

## 2022-09-23 ENCOUNTER — OFFICE VISIT (OUTPATIENT)
Dept: SLEEP MEDICINE | Age: 47
End: 2022-09-23
Payer: MEDICARE

## 2022-09-23 VITALS
SYSTOLIC BLOOD PRESSURE: 155 MMHG | OXYGEN SATURATION: 98 % | DIASTOLIC BLOOD PRESSURE: 93 MMHG | HEIGHT: 70 IN | WEIGHT: 300 LBS | TEMPERATURE: 98.4 F | BODY MASS INDEX: 42.95 KG/M2 | HEART RATE: 74 BPM

## 2022-09-23 DIAGNOSIS — G47.33 OSA (OBSTRUCTIVE SLEEP APNEA): Primary | ICD-10-CM

## 2022-09-23 DIAGNOSIS — F32.A ANXIETY AND DEPRESSION: ICD-10-CM

## 2022-09-23 DIAGNOSIS — I10 PRIMARY HYPERTENSION: ICD-10-CM

## 2022-09-23 DIAGNOSIS — F41.9 ANXIETY AND DEPRESSION: ICD-10-CM

## 2022-09-23 PROCEDURE — G8432 DEP SCR NOT DOC, RNG: HCPCS | Performed by: INTERNAL MEDICINE

## 2022-09-23 PROCEDURE — G8753 SYS BP > OR = 140: HCPCS | Performed by: INTERNAL MEDICINE

## 2022-09-23 PROCEDURE — G8427 DOCREV CUR MEDS BY ELIG CLIN: HCPCS | Performed by: INTERNAL MEDICINE

## 2022-09-23 PROCEDURE — 99204 OFFICE O/P NEW MOD 45 MIN: CPT | Performed by: INTERNAL MEDICINE

## 2022-09-23 PROCEDURE — G8417 CALC BMI ABV UP PARAM F/U: HCPCS | Performed by: INTERNAL MEDICINE

## 2022-09-23 PROCEDURE — G8755 DIAS BP > OR = 90: HCPCS | Performed by: INTERNAL MEDICINE

## 2022-09-23 NOTE — PROGRESS NOTES
[address] [facility] [place]. [place], [address]  Tel.  [phone]  Fax. 100 [facility] 60  [place], [address] [facility]  Tel.  [phone]  Fax. [phone] [address] [facility] Nikolay Maldonado  Tel.  [phone]  Fax. [phone]       Radha Mcghee is a 55y.o. year old male referred by  Jose Raul Oneil NP   for evaluation of a sleep disorder. ASSESSMENT/PLAN:      ICD-10-CM ICD-9-CM    1. PAULA (obstructive sleep apnea)  G47.33 327.23 SLEEP STUDY UNATTENDED, 4 CHANNEL      2. Anxiety and depression  F41.9 300.00     F32. A 311       3. Primary hypertension  I10 401.9       4. BMI 40.0-44.9, adult [facility])  Z68.41 V85.41           Patient has a history and examination consistent with the diagnosis of sleep apnea. Follow-up and Dispositions    Return for telephone follow-up after testing is completed. * The patient currently has a High Risk for having sleep apnea. STOP-BANG score 6.    * Sleep testing was ordered for initial evaluation. Orders Placed This Encounter    SLEEP STUDY UNATTENDED, 4 CHANNEL     Order Specific Question:   Reason for Exam     Answer:   PAULA       * He was provided information on sleep apnea including corresponding risk factors and the importance of proper treatment. * Treatment options were reviewed in detail. he would like to proceed with PAP therapy. Patient will be seen in follow-up in 6-8 weeks after PAP setup to gauge treatment response and adherence to therapy. * The patient was counseled regarding proper sleep hygiene, with emphasis on ensuring sufficient total sleep time; safe driving and the benefits of exercise and weight loss. * All of his questions were addressed. 2. Recommended a dedicated weight loss program through appropriate diet and exercise regimen as significant weight reduction has been shown to reduce severity of obstructive sleep apnea.      SUBJECTIVE/OBJECTIVE:    Johny Vigil Ashlie is an 55 y.o. male referred for evaluation for a sleep disorder. He complains of snoring associated with snorting, awakening in the middle of the night because of urination. Symptoms began several years ago, gradually worsening since that time. He usually can fall asleep in 10-15 minutes. Family or house members note snoring and periods of not breathing. He denies of symptoms indicative of cataplexy, sleep paralysis or sleep related hallucinations. He denies of a history of unusual movements occurring during sleep. Jesenia Vivar does wake up frequently at night. He is bothered by waking up too early and left unable to get back to sleep. He actually sleeps about 4 hours at night and wakes up about 6 times during the night. He does work shifts:  First Shift; Other Shift. Maribell Ba indicates he does get too little sleep at night. His bedtime is 0000. He awakens at 0500. He does take naps. He takes 7 naps a week lasting 10-20 minutes. He has the following observed behaviors: Loud snoring, Light snoring, Pauses in breathing;  . Other remarks: The patient has not undergone diagnostic testing for the current problems. Review of Systems:  Constitutional:  No significant weight loss or weight gain  Eyes:  No blurred vision  CVS:  No significant chest pain  Pulm:  No significant shortness of breath  GI:  No significant nausea or vomiting  :  No significant nocturia  Musculoskeletal:  No significant joint pain at night  Skin:  No significant rashes  Neuro:  No significant dizziness   Psych:  No active mood issues    Sleep Review of Systems: notable for Negative difficulty falling asleep; Positive awakenings at night; Positive perceived regular dreaming; Negative nightmares; Positive  early morning headaches; Negative  memory problems; Negative  concentration issues;  Negative caffeine;  Negative alcohol;   Negative history of any automobile or occupational accidents due to daytime drowsiness. [place] Sleepiness Score: 21   and Modified F.O.S.Q. Score Total / 2: 14    Visit Vitals  BP (!) 155/93   Pulse 74   Temp 98.4 °F (36.9 °C)   Ht 5' 10\" (1.778 m)   Wt 300 lb (136.1 kg)   SpO2 98%   BMI 43.05 kg/m²           General:   Alert, oriented, not in acute distress   Eyes:  Anicteric Sclerae; intact EOM's   Nose:  No obvious nasal septum deviation    Oropharynx:   Mallampati score 4, thick tongue base, uvula not seen due to low-lying soft palate, narrow tonsilo-pharyngeal pilars, tongue scalloped   Neck:   midline trachea,  no JVD   Chest/Lungs:  symmetrical lung expansion ,clear lung fields on auscultation    CVS:  Normal rate, regular rhythm    Extremities:  No obvious rashes, absent edema    Neuro:  No focal deficits; No obvious tremor    Psych:  Normal eye contact; normal  affect, normal countenance          Jakob Rowland MD, FAASM  Diplomate American Board of Sleep Medicine  Diplomate in Sleep Medicine - ABP    Electronically signed.  09/23/22

## 2022-09-23 NOTE — PATIENT INSTRUCTIONS
217 [facility] [place]. [place], [address]  Tel.  [phone]  Fax. [address] [facility]  [place], [address] [facility]  Tel.  [phone]  Fax. [phone] [address] Crowe  Tel.  [phone]  Fax. [phone]     Sleep Apnea: After Your Visit  Your Care Instructions  Sleep apnea occurs when you frequently stop breathing for 10 seconds or longer during sleep. It can be mild to severe, based on the number of times per hour that you stop breathing or have slowed breathing. Blocked or narrowed airways in your nose, mouth, or throat can cause sleep apnea. Your airway can become blocked when your throat muscles and tongue relax during sleep. Sleep apnea is common, occurring in 1 out of 20 individuals. Individuals having any of the following characteristics should be evaluated and treated right away due to high risk and detrimental consequences from untreated sleep apnea:  Obesity  Congestive Heart failure  Atrial Fibrillation  Uncontrolled Hypertension  Type II Diabetes  Night-time Arrhythmias  Stroke  Pulmonary Hypertension  High-risk Driving Populations (pilots, truck drivers, etc.)  Patients Considering Weight-loss Surgery    How do you know you have sleep apnea? You probably have sleep apnea if you answer 'yes' to 3 or more of the following questions:  S - Have you been told that you Snore? T - Are you often Tired during the day? O - Has anyone Observed you stop breathing while sleeping? P- Do you have (or are being treated for) high blood Pressure? B - Are you obese (Body Mass Index > 35)? A - Is your Age 48years old or older? N - Is your Neck size greater than 16 inches? G - Are you male Gender? A sleep physician can prescribe a breathing device that prevents tissues in the throat from blocking your airway. Or your doctor may recommend using a dental device (oral breathing device) to help keep your airway open.  In some cases, surgery may be needed to remove enlarged tissues in the throat. Follow-up care is a key part of your treatment and safety. Be sure to make and go to all appointments, and call your doctor if you are having problems. It's also a good idea to know your test results and keep a list of the medicines you take. How can you care for yourself at home? Lose weight, if needed. It may reduce the number of times you stop breathing or have slowed breathing. Go to bed at the same time every night. Sleep on your side. It may stop mild apnea. If you tend to roll onto your back, sew a pocket in the back of your pa[employer] Media top. Put a tennis ball into the pocket, and stitch the pocket shut. This will help keep you from sleeping on your back. Avoid alcohol and medicines such as sleeping pills and sedatives before bed. Do not smoke. Smoking can make sleep apnea worse. If you need help quitting, talk to your doctor about stop-smoking programs and medicines. These can increase your chances of quitting for good. Prop up the head of your bed 4 to 6 inches by putting bricks under the legs of the bed. Treat breathing problems, such as a stuffy nose, caused by a cold or allergies. Use a continuous positive airway pressure (CPAP) breathing machine if lifestyle changes do not help your apnea and your doctor recommends it. The machine keeps your airway from closing when you sleep. If CPAP does not help you, ask your doctor whether you should try other breathing machines. A bilevel positive airway pressure machine has two types of air pressureâone for breathing in and one for breathing out. Another device raises or lowers air pressure as needed while you breathe. If your nose feels dry or bleeds when using one of these machines, talk with your doctor about increasing moisture in the air. A humidifier may help.   If your nose is runny or stuffy from using a breathing machine, talk with your doctor about using decongestants or a corticosteroid nasal spray.  When should you call for help? Watch closely for changes in your health, and be sure to contact your doctor if:  You still have sleep apnea even though you have made lifestyle changes. You are thinking of trying a device such as CPAP. You are having problems using a CPAP or similar machine. Where can you learn more? Go to Munch On Me. Enter I616 in the search box to learn more about \"Sleep Apnea: After Your Visit. \"   © 8089-0624 Healthwise, Incorporated. Care instructions adapted under license by [employer] (which disclaims liability or warranty for this information). This care instruction is for use with your licensed healthcare professional. If you have questions about a medical condition or this instruction, always ask your healthcare professional. Fei Reus any warranty or liability for your use of this information. PROPER SLEEP HYGIENE    What to avoid  Do not have drinks with caffeine, such as coffee or black tea, for 8 hours before bed. Do not smoke or use other types of tobacco near bedtime. Nicotine is a stimulant and can keep you awake. Avoid drinking alcohol late in the evening, because it can cause you to wake in the middle of the night. Do not eat a big meal close to bedtime. If you are hungry, eat a light snack. Do not drink a lot of water close to bedtime, because the need to urinate may wake you up during the night. Do not read or watch TV in bed. Use the bed only for sleeping and sexual activity. What to try  Go to bed at the same time every night, and wake up at the same time every morning. Do not take naps during the day. Keep your bedroom quiet, dark, and cool. Get regular exercise, but not within 3 to 4 hours of your bedtime. .  Sleep on a comfortable pillow and mattress. If watching the clock makes you anxious, turn it facing away from you so you cannot see the time.   If you worry when you lie down, start a worry book. Well before bedtime, write down your worries, and then set the book and your concerns aside. Try meditation or other relaxation techniques before you go to bed. If you cannot fall asleep, get up and go to another room until you feel sleepy. Do something relaxing. Repeat your bedtime routine before you go to bed again. Make your house quiet and calm about an hour before bedtime. Turn down the lights, turn off the TV, log off the computer, and turn down the volume on music. This can help you relax after a busy day. Drowsy Driving  The [address] [place] [address] Road Traffic Safety Administration cites drowsiness as a causing factor in more than 442,539 police reported crashes annually, resulting in 76,000 injuries and 1,500 deaths. Other surveys suggest 55% of people polled have driven while drowsy in the past year, 23% had fallen asleep but not crashed, 3% crashed, and 2% had and accident due to drowsy driving. Who is at risk? Young Drivers: One study of drowsy driving accidents states that 55% of the drivers were under 25 years. Of those, 75% were male. Shift Workers and Travelers: People who work overnight or travel across time zones frequently are at higher risk of experiencing Circadian Rhythm Disorders. They are trying to work and function when their body is programed to sleep. Sleep Deprived: Lack of sleep has a serious impact on your ability to pay attention or focus on a task. Consistently getting less than the average of 8 hours your body needs creates partial or cumulative sleep deprivation. Untreated Sleep Disorders: Sleep Apnea, Narcolepsy, R.L.S., and other sleep disorders (untreated) prevent a person from getting enough restful sleep. This leads to excessive daytime sleepiness and increases the risk for drowsy driving accidents by up to 7 times. Medications / Alcohol: Even over the counter medications can cause drowsiness.  Medications that impair a drivers attention should have a warning label. Alcohol naturally makes you sleepy and on its own can cause accidents. Combined with excessive drowsiness its effects are amplified. Signs of Drowsy Driving:   * You don't remember driving the last few miles   * You may drift out of your george   * You are unable to focus and your thoughts wander   * You may yawn more often than normal   * You have difficulty keeping your eyes open / nodding off   * Missing traffic signs, speeding, or tailgating  Prevention-   Good sleep hygiene, lifestyle and behavioral choices have the most impact on drowsy driving. There is no substitute for sleep and the average person requires 8 hours nightly. If you find yourself driving drowsy, stop and sleep. Consider the sleep hygiene tips provided during your visit as well. Medication Refill Policy: Refills for all medications require 1 week advance notice. Please have your pharmacy fax a refill request. We are unable to fax, or call in \"controled substance\" medications and you will need to pick these prescriptions up from our office. [employer] Activation    Thank you for requesting access to [employer]. Please follow the instructions below to securely access and download your online medical record. [employer] allows you to send messages to your doctor, view your test results, renew your prescriptions, schedule appointments, and more. How Do I Sign Up? In your internet browser, go to [email or web address]. Click on the First Time User? Click Here link in the Sign In box. You will see the New Member Sign Up page. Enter your Or[employer] Software Access Code exactly as it appears below. You will not need to use this code after youve completed the sign-up process. If you do not sign up before the expiration date, you must request a new code. Orchid Software Access Code:  Activation code not generated  Current Orchid Software Status: Active (This is the date your Or[employer] Software access code will )    Enter the last four digits of your Social Security Number (xxxx) and Date of Birth (mm/dd/yyyy) as indicated and click Submit. You will be taken to the next sign-up page. Create a C2C Link ID. This will be your C2C Link login ID and cannot be changed, so think of one that is secure and easy to remember. Create a C2C Link password. You can change your password at any time. Enter your Password Reset Question and Answer. This can be used at a later time if you forget your password. Enter your e-mail address. You will receive e-mail notification when new information is available in [address] Click Sign Up. You can now view and download portions of your medical record. Click the Forrst link to download a portable copy of your medical information. Additional Information    If you have questions, please call [phone]. Remember, C2C Link is NOT to be used for urgent needs. For medical emergencies, dial 911.

## 2022-10-25 ENCOUNTER — TELEPHONE (OUTPATIENT)
Dept: SLEEP MEDICINE | Age: 47
End: 2022-10-25

## 2022-10-26 ENCOUNTER — DOCUMENTATION ONLY (OUTPATIENT)
Dept: SLEEP MEDICINE | Age: 47
End: 2022-10-26

## 2022-10-26 ENCOUNTER — HOSPITAL ENCOUNTER (OUTPATIENT)
Dept: SLEEP MEDICINE | Age: 47
Discharge: HOME | End: 2022-10-26
Payer: MEDICARE

## 2022-10-26 ENCOUNTER — TELEPHONE (OUTPATIENT)
Dept: SLEEP MEDICINE | Age: 47
End: 2022-10-26

## 2022-10-26 VITALS
BODY MASS INDEX: 42.95 KG/M2 | HEIGHT: 70 IN | OXYGEN SATURATION: 97 % | SYSTOLIC BLOOD PRESSURE: 142 MMHG | DIASTOLIC BLOOD PRESSURE: 89 MMHG | HEART RATE: 111 BPM | WEIGHT: 300 LBS | TEMPERATURE: 97.8 F

## 2022-10-26 DIAGNOSIS — G47.33 OSA (OBSTRUCTIVE SLEEP APNEA): Primary | ICD-10-CM

## 2022-10-26 DIAGNOSIS — G47.33 OSA (OBSTRUCTIVE SLEEP APNEA): ICD-10-CM

## 2022-10-26 PROCEDURE — 95810 POLYSOM 6/> YRS 4/> PARAM: CPT | Performed by: INTERNAL MEDICINE

## 2022-10-26 NOTE — TELEPHONE ENCOUNTER
Orders Placed This Encounter    POLYSOMNOGRAPHY 1 NIGHT     Standing Status:   Future     Standing Expiration Date:   4/26/2023     Order Specific Question:   Reason for Exam     Answer:   PAULA

## 2022-10-27 NOTE — PROGRESS NOTES
[address] [facility]. [place], [address]  Tel.  [phone]  Fax. 100 [facility] 60  [place], [address] [facility]  Tel.  [phone]  Fax. [phone] [address] [place] Nikolay Luo  Tel.  [phone]  Fax. [phone]     Sleep Study Technical Notes        PRE-Test:  [address] (: 1975) arrived in the lobby. Patient was greeted and screening questions asked. The patient was taken to the Sleep Center and taken directly to his/her room. Vitals:  Temperature (97.8)   BP (142/89)   SaO2 (97%)   Weight per patient (300)  Procedure explained to the patient and questions were answered. The patient expressed understanding of the procedure. Electrodes were applied without incident. The patient was placed in bed and the study was started. PAP mask acclimation for **min. Patient NA tolerate mask. Sleep aid taken:  No      Acquisition Notes:  Lights off: 10:31 PM    Respiratory events: Hypopneas  ECG:  NSR  Snoring:  Mild  Positional therapy with:  No Other comments: No  Patient had caregiver in attendance:  No  Patient watched TV or on phone after lights out had tablet on all night. Patient slept with positional therapy:  No used  3 pillows  Patient slept with head of bed elevated:  No  Patient wore an oral appliance:  No  Patient to bathroom 2 times  Pediatric Patient:  Parent accompanied patient: NA  Parent slept in bed with patient: NA      POST Test:  Patient was awakened. Electrodes were removed. The patient was discharged after answering the Post Questionnaire. Patient stated that he was alert and ok to drive. Equipment and room cleaned per infection control policy.

## 2022-11-03 ENCOUNTER — TELEPHONE (OUTPATIENT)
Dept: SLEEP MEDICINE | Age: 47
End: 2022-11-03

## 2022-11-03 DIAGNOSIS — G47.419 PRIMARY NARCOLEPSY WITHOUT CATAPLEXY: Primary | ICD-10-CM

## 2022-11-11 ENCOUNTER — DOCUMENTATION ONLY (OUTPATIENT)
Dept: SLEEP MEDICINE | Age: 47
End: 2022-11-11

## 2022-11-11 NOTE — TELEPHONE ENCOUNTER
An[place]ley Nurse is to be contacted by lead sleep technologist regarding results of Sleep Testing which was indicative of an average AHI of 0.9 per hour with an SpO2 alia of 86% and SpO2 of < 88% being 0.00 minutes. Attended night and day testing is recommended to assess the complaint of excessive daytime sleepiness reported by the patient at the time of initial visit and as implied by an [place] Sleepiness Score of 21. Patient should be educated on the risks versus benefits of this elective sleep testing procedure being done during the pandemic and their consent be obtained. Encounter Diagnosis   Name Primary?     Primary narcolepsy without cataplexy Yes       Orders Placed This Encounter    MULTIPLE SLEEP LATENCY TEST     Standing Status:   Future     Standing Expiration Date:   11/11/2023    DRUG ABUSE PROF, URINE (SEVEN DRUGS), MS COFIRM     Standing Status:   Future     Number of Occurrences:   1     Standing Expiration Date:   5/11/2023    POLYSOMNOGRAPHY 1 NIGHT     Order Specific Question:   Reason for Exam     Answer:   PAULA

## 2022-11-11 NOTE — TELEPHONE ENCOUNTER
Results have been sent to  \[facility]\"" & [facility]. Please schedule PSG/MSLT study.     Thanks

## 2022-12-30 DIAGNOSIS — E78.00 HIGH CHOLESTEROL: ICD-10-CM

## 2022-12-31 LAB
CHOLEST SERPL-MCNC: 195 MG/DL
HDLC SERPL-MCNC: 49 MG/DL
HDLC SERPL: 4 {RATIO} (ref 0–5)
LDLC SERPL CALC-MCNC: 107 MG/DL (ref 0–100)
TRIGL SERPL-MCNC: 195 MG/DL (ref ?–150)
VLDLC SERPL CALC-MCNC: 39 MG/DL

## 2023-01-12 ENCOUNTER — APPOINTMENT (OUTPATIENT)
Dept: GENERAL RADIOLOGY | Age: 48
End: 2023-01-12
Attending: EMERGENCY MEDICINE
Payer: MEDICARE

## 2023-01-12 ENCOUNTER — HOSPITAL ENCOUNTER (EMERGENCY)
Age: 48
Discharge: HOME | End: 2023-01-12
Attending: EMERGENCY MEDICINE
Payer: MEDICARE

## 2023-01-12 VITALS
HEIGHT: 70 IN | HEART RATE: 73 BPM | WEIGHT: 313.6 LBS | BODY MASS INDEX: 44.9 KG/M2 | DIASTOLIC BLOOD PRESSURE: 102 MMHG | TEMPERATURE: 98 F | SYSTOLIC BLOOD PRESSURE: 144 MMHG | RESPIRATION RATE: 18 BRPM | OXYGEN SATURATION: 99 %

## 2023-01-12 DIAGNOSIS — L08.9 WOUND INFECTION: ICD-10-CM

## 2023-01-12 DIAGNOSIS — T14.8XXA ABRASION: ICD-10-CM

## 2023-01-12 DIAGNOSIS — S50.02XA CONTUSION OF LEFT ELBOW, INITIAL ENCOUNTER: Primary | ICD-10-CM

## 2023-01-12 DIAGNOSIS — T14.8XXA WOUND INFECTION: ICD-10-CM

## 2023-01-12 PROCEDURE — 73080 X-RAY EXAM OF ELBOW: CPT

## 2023-01-12 PROCEDURE — 74011250637 HC RX REV CODE- 250/637: Performed by: EMERGENCY MEDICINE

## 2023-01-12 PROCEDURE — 99283 EMERGENCY DEPT VISIT LOW MDM: CPT

## 2023-01-12 PROCEDURE — 74011000250 HC RX REV CODE- 250: Performed by: EMERGENCY MEDICINE

## 2023-01-12 RX ORDER — SULFAMETHOXAZOLE AND TRIMETHOPRIM 800; 160 MG/1; MG/1
2 TABLET ORAL 2 TIMES DAILY
Qty: 40 TABLET | Refills: 0 | Status: SHIPPED | OUTPATIENT
Start: 2023-01-12

## 2023-01-12 RX ORDER — HYDROCODONE BITARTRATE AND ACETAMINOPHEN 5; 325 MG/1; MG/1
1 TABLET ORAL
Status: COMPLETED | OUTPATIENT
Start: 2023-01-12 | End: 2023-01-12

## 2023-01-12 RX ORDER — BACITRACIN 500 UNIT/G
1 PACKET (EA) TOPICAL ONCE
Status: COMPLETED | OUTPATIENT
Start: 2023-01-12 | End: 2023-01-12

## 2023-01-12 RX ORDER — CEPHALEXIN 500 MG/1
500 CAPSULE ORAL 4 TIMES DAILY
Qty: 28 CAPSULE | Refills: 0 | Status: SHIPPED | OUTPATIENT
Start: 2023-01-12 | End: 2023-01-19

## 2023-01-12 RX ORDER — SULFAMETHOXAZOLE AND TRIMETHOPRIM 800; 160 MG/1; MG/1
2 TABLET ORAL
Status: COMPLETED | OUTPATIENT
Start: 2023-01-12 | End: 2023-01-12

## 2023-01-12 RX ADMIN — BACITRACIN 1 PACKET: 500 OINTMENT TOPICAL at 03:30

## 2023-01-12 RX ADMIN — SULFAMETHOXAZOLE AND TRIMETHOPRIM 2 TABLET: 800; 160 TABLET ORAL at 02:36

## 2023-01-12 RX ADMIN — HYDROCODONE BITARTRATE AND ACETAMINOPHEN 1 TABLET: 5; 325 TABLET ORAL at 02:36
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
## 2023-06-01 ENCOUNTER — OFFICE VISIT (OUTPATIENT)
Facility: CLINIC | Age: 48
End: 2023-06-01
Payer: MEDICARE

## 2023-06-01 VITALS
TEMPERATURE: 98.3 F | OXYGEN SATURATION: 98 % | BODY MASS INDEX: 42.66 KG/M2 | WEIGHT: 298 LBS | HEIGHT: 70 IN | HEART RATE: 97 BPM | SYSTOLIC BLOOD PRESSURE: 120 MMHG | RESPIRATION RATE: 16 BRPM | DIASTOLIC BLOOD PRESSURE: 75 MMHG

## 2023-06-01 DIAGNOSIS — C05.9: ICD-10-CM

## 2023-06-01 DIAGNOSIS — E03.9 ACQUIRED HYPOTHYROIDISM: ICD-10-CM

## 2023-06-01 DIAGNOSIS — Z11.3 SCREENING EXAMINATION FOR STD (SEXUALLY TRANSMITTED DISEASE): ICD-10-CM

## 2023-06-01 DIAGNOSIS — H61.22 IMPACTED CERUMEN OF LEFT EAR: ICD-10-CM

## 2023-06-01 DIAGNOSIS — E66.01 OBESITY, CLASS III, BMI 40-49.9 (MORBID OBESITY) (HCC): ICD-10-CM

## 2023-06-01 DIAGNOSIS — E03.9 ACQUIRED HYPOTHYROIDISM: Primary | ICD-10-CM

## 2023-06-01 PROCEDURE — G8427 DOCREV CUR MEDS BY ELIG CLIN: HCPCS | Performed by: NURSE PRACTITIONER

## 2023-06-01 PROCEDURE — 99214 OFFICE O/P EST MOD 30 MIN: CPT | Performed by: NURSE PRACTITIONER

## 2023-06-01 PROCEDURE — 1036F TOBACCO NON-USER: CPT | Performed by: NURSE PRACTITIONER

## 2023-06-01 PROCEDURE — 69209 REMOVE IMPACTED EAR WAX UNI: CPT | Performed by: NURSE PRACTITIONER

## 2023-06-01 PROCEDURE — G8417 CALC BMI ABV UP PARAM F/U: HCPCS | Performed by: NURSE PRACTITIONER

## 2023-06-01 RX ORDER — OXYCODONE HYDROCHLORIDE 10 MG/1
TABLET ORAL
COMMUNITY
Start: 2022-05-02

## 2023-06-01 RX ORDER — LEVOTHYROXINE SODIUM 0.03 MG/1
TABLET ORAL
COMMUNITY
Start: 2023-03-27

## 2023-06-01 RX ORDER — SILDENAFIL 50 MG/1
50 TABLET, FILM COATED ORAL DAILY PRN
Qty: 20 TABLET | Refills: 2 | Status: SHIPPED | OUTPATIENT
Start: 2023-06-01

## 2023-06-01 RX ORDER — GABAPENTIN 300 MG/1
300 CAPSULE ORAL 3 TIMES DAILY
Qty: 90 CAPSULE | Refills: 2 | Status: SHIPPED | OUTPATIENT
Start: 2023-06-01 | End: 2024-06-01

## 2023-06-01 NOTE — PROGRESS NOTES
Chief Complaint   Patient presents with    6 Month Follow-Up     /75   Pulse 97   Temp 98.3 °F (36.8 °C)   Resp 16   Ht 5' 10\" (1.778 m)   Wt 298 lb (135.2 kg)   SpO2 98%   BMI 42.76 kg/m²   1. Have you been to the ER, urgent care clinic since your last visit? Hospitalized since your last visit? No    2. Have you seen or consulted any other health care providers outside of the [address] [place] [address] since your last visit? Include any pap smears or colon screening.  No

## 2023-06-01 NOTE — PROGRESS NOTES
Subjective: (As above and below)     Chief Complaint   Patient presents with    6 Month Follow-Up     Lisa Collado is a 52y.o. 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  Chronic opiates: denies constipation w/ /mirilax     Migraines: sees neuro, stable     PTSD: sees psych reports doing well \"if I take my meds\"     Hypertension ROS:  taking medications as instructed, no medication side effects noted, no TIAs, no chest pain on exertion, no dyspnea on exertion, no swelling of ankles    Hyperlipidemia tolerating statin       Suspected sleep apnea: some snoring, unknown witnessed apnea  Was previously rf'd but needs fu     Erectile dysfunction: difficulty maintaining erection- viagra 25mg helps a bit w/o a/e    Wt Readings from Last 3 Encounters:   23 298 lb (135.2 kg)   22 300 lb (136.1 kg)   22 (!) 302 lb 9.6 oz (137.3 kg)       BP Readings from Last 3 Encounters:   23 120/75   22 (!) 155/93   22 131/81       L ear pain: few days, no drainage, fevers        Reviewed PmHx, RxHx, FmHx, SocHx, AllgHx and updated in chart. No family history on file.     Past Medical History:   Diagnosis Date    Tonsillar abscess 2018    Tonsillar cancer ([facility] [address]     dx 2018      Social History     Socioeconomic History    Marital status: Single     Spouse name: None    Number of children: None    Years of education: None    Highest education level: None   Tobacco Use    Smoking status: Former     Packs/day: 0.50     Types: Cigarettes     Quit date: 2017     Years since quittin.9    Smokeless tobacco: Never   Substance and Sexual Activity    Alcohol use: No    Drug use: Yes     Types: Marijuana Rukhsana Pedraza)          Current Outpatient Medications   Medication Sig    oxyCODONE HCl (OXY-IR) 10 MG immediate release tablet oxycodone 10 mg tablet    acetaminophen (TYLENOL) 325 MG tablet Take by mouth every 4 hours as needed

## 2023-06-01 NOTE — PATIENT INSTRUCTIONS
Psychiatry  [facility]  [address] [place] · [phone]    Dr. Karma Roldan, sleep med  Address: [address] [place] [address], [place], [address] [place] [address] Ave  Phone: [phone]    Debrox or sweet oil for ears

## 2023-06-02 LAB
BUN SERPL-MCNC: 15 MG/DL (ref 6–24)
BUN/CREAT SERPL: 11 (ref 9–20)
CALCIUM SERPL-MCNC: 9.7 MG/DL (ref 8.7–10.2)
CHLORIDE SERPL-SCNC: 102 MMOL/L (ref 96–106)
CO2 SERPL-SCNC: 23 MMOL/L (ref 20–29)
CREAT SERPL-MCNC: 1.36 MG/DL (ref 0.76–1.27)
EGFRCR SERPLBLD CKD-EPI 2021: 65 ML/MIN/1.73
ERYTHROCYTE [DISTWIDTH] IN BLOOD BY AUTOMATED COUNT: 13.5 % (ref 11.6–15.4)
GLUCOSE SERPL-MCNC: 70 MG/DL (ref 70–99)
HCT VFR BLD AUTO: 45.3 % (ref 37.5–51)
HGB BLD-MCNC: 15.5 G/DL (ref 13–17.7)
MCH RBC QN AUTO: 29.8 PG (ref 26.6–33)
MCHC RBC AUTO-ENTMCNC: 34.2 G/DL (ref 31.5–35.7)
MCV RBC AUTO: 87 FL (ref 79–97)
PLATELET # BLD AUTO: 194 X10E3/UL (ref 150–450)
POTASSIUM SERPL-SCNC: 4.1 MMOL/L (ref 3.5–5.2)
RBC # BLD AUTO: 5.2 X10E6/UL (ref 4.14–5.8)
SODIUM SERPL-SCNC: 142 MMOL/L (ref 134–144)
TSH SERPL DL<=0.005 MIU/L-ACNC: 4.45 UIU/ML (ref 0.45–4.5)
WBC # BLD AUTO: 8.5 X10E3/UL (ref 3.4–10.8)

## 2023-06-04 LAB
C TRACH RRNA SPEC QL NAA+PROBE: NEGATIVE
N GONORRHOEA RRNA SPEC QL NAA+PROBE: NEGATIVE
T VAGINALIS RRNA SPEC QL NAA+PROBE: NEGATIVE

## 2023-07-30 RX ORDER — AMLODIPINE BESYLATE 10 MG/1
TABLET ORAL
Qty: 90 TABLET | Refills: 0 | Status: SHIPPED | OUTPATIENT
Start: 2023-07-30

## 2023-12-01 NOTE — PROGRESS NOTES
Hematology Oncology Progress Note       Follow up for: head and neck cancer (left tonsil)        Patient complains of the following:     S: Mouth pain still severe but a little improved overall, not taking anything by mouth      Patient Vitals for the past 24 hrs:   BP Temp Pulse Resp SpO2   09/19/18 0725 130/65 97.8 °F (36.6 °C) 86 16 95 %   09/18/18 2328 132/73 98.1 °F (36.7 °C) 80 16 97 %   09/18/18 2051 160/85 98.4 °F (36.9 °C) 77 18 95 %   09/18/18 1616 138/85 97.3 °F (36.3 °C) 83 18 100 %   09/18/18 1348 - 98.3 °F (36.8 °C) - - -   09/18/18 1307 148/87 (!) 100.7 °F (38.2 °C) 87 18 95 %   09/18/18 1120 - - 87 18 100 %       Review of Systems:negative except as above    Gen NAD  OP mucositis  CV reg  Lungs clear   abd benign  Ext no edema      Labs:  Recent Results (from the past 24 hour(s))   CBC WITH MANUAL DIFF    Collection Time: 09/19/18  2:37 AM   Result Value Ref Range    WBC 2.6 (L) 4.1 - 11.1 K/uL    RBC 3.06 (L) 4.10 - 5.70 M/uL    HGB 8.8 (L) 12.1 - 17.0 g/dL    HCT 25.4 (L) 36.6 - 50.3 %    MCV 83.0 80.0 - 99.0 FL    MCH 28.8 26.0 - 34.0 PG    MCHC 34.6 30.0 - 36.5 g/dL    RDW 12.8 11.5 - 14.5 %    PLATELET 983 (L) 894 - 400 K/uL    MPV 8.8 (L) 8.9 - 12.9 FL    NRBC 0.0 0  WBC    ABSOLUTE NRBC 0.00 0.00 - 0.01 K/uL    NEUTROPHILS 88 (H) 32 - 75 %    BAND NEUTROPHILS 2 0 - 6 %    LYMPHOCYTES 4 (L) 12 - 49 %    MONOCYTES 4 (L) 5 - 13 %    EOSINOPHILS 2 0 - 7 %    BASOPHILS 0 0 - 1 %    METAMYELOCYTES 0 0 %    MYELOCYTES 0 0 %    PROMYELOCYTES 0 0 %    BLASTS 0 0 %    OTHER CELL 0 0      IMMATURE GRANULOCYTES 0 %    TOTAL CELLS COUNTED 50      ABS. NEUTROPHILS 2.3 1.8 - 8.0 K/UL    ABS. LYMPHOCYTES 0.1 (L) 0.8 - 3.5 K/UL    ABS. MONOCYTES 0.1 0.0 - 1.0 K/UL    ABS. EOSINOPHILS 0.1 0.0 - 0.4 K/UL    ABS. BASOPHILS 0.0 0.0 - 0.1 K/UL    ABS. IMM.  GRANS. 0.0 K/UL    DF MANUAL      RBC COMMENTS NORMOCYTIC, NORMOCHROMIC     METABOLIC PANEL, COMPREHENSIVE    Collection Time: 09/19/18  2:37 AM   Result Value Ref Range    Sodium 135 (L) 136 - 145 mmol/L    Potassium 3.3 (L) 3.5 - 5.1 mmol/L    Chloride 100 97 - 108 mmol/L    CO2 31 21 - 32 mmol/L    Anion gap 4 (L) 5 - 15 mmol/L    Glucose 87 65 - 100 mg/dL    BUN 8 6 - 20 MG/DL    Creatinine 1.47 (H) 0.70 - 1.30 MG/DL    BUN/Creatinine ratio 5 (L) 12 - 20      GFR est AA >60 >60 ml/min/1.73m2    GFR est non-AA 53 (L) >60 ml/min/1.73m2    Calcium 7.4 (L) 8.5 - 10.1 MG/DL    Bilirubin, total 0.3 0.2 - 1.0 MG/DL    ALT (SGPT) 24 12 - 78 U/L    AST (SGOT) 12 (L) 15 - 37 U/L    Alk. phosphatase 47 45 - 117 U/L    Protein, total 6.4 6.4 - 8.2 g/dL    Albumin 2.7 (L) 3.5 - 5.0 g/dL    Globulin 3.7 2.0 - 4.0 g/dL    A-G Ratio 0.7 (L) 1.1 - 2.2     MAGNESIUM    Collection Time: 09/19/18  2:37 AM   Result Value Ref Range    Magnesium 1.8 1.6 - 2.4 mg/dL       Assessment and Plan:    left tonsillar carcinoma     S/p chemo /RT  S/p C2 of cisplatin  Currently on hold  Transfer to [facility] when bed available  Severe mucositis     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 PEG tube feedings as tolerated. Symptomatic meds - magic mouthwash, viscous lidocaine, morphine as tolerated. pain control - doing better with  Morphine, magic mouthwash, viscous lidocaine.      Nausea - on zofran    One low grade temp  -not neutropenic  -afebrile now  -monitor    Hypokalemia:  - give 4 K runs today resident of [facility] ,ambulates with rollator there

## 2024-02-09 ENCOUNTER — TELEMEDICINE (OUTPATIENT)
Facility: CLINIC | Age: 49
End: 2024-02-09
Payer: MEDICARE

## 2024-02-09 DIAGNOSIS — M54.50 ACUTE BILATERAL LOW BACK PAIN WITHOUT SCIATICA: ICD-10-CM
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
## 2024-02-09 DIAGNOSIS — M54.2 NECK PAIN: ICD-10-CM

## 2024-02-09 PROCEDURE — 99213 OFFICE O/P EST LOW 20 MIN: CPT | Performed by: NURSE PRACTITIONER

## 2024-02-09 SDOH — ECONOMIC STABILITY: INCOME INSECURITY: HOW HARD IS IT FOR YOU TO PAY FOR THE VERY BASICS LIKE FOOD, HOUSING, MEDICAL CARE, AND HEATING?: NOT HARD AT ALL

## 2024-02-09 SDOH — ECONOMIC STABILITY: FOOD INSECURITY: WITHIN THE PAST 12 MONTHS, YOU WORRIED THAT YOUR FOOD WOULD RUN OUT BEFORE YOU GOT MONEY TO BUY MORE.: NEVER TRUE

## 2024-02-09 SDOH — ECONOMIC STABILITY: HOUSING INSECURITY
IN THE LAST 12 MONTHS, WAS THERE A TIME WHEN YOU DID NOT HAVE A STEADY PLACE TO SLEEP OR SLEPT IN A SHELTER (INCLUDING NOW)?: NO

## 2024-02-09 SDOH — ECONOMIC STABILITY: FOOD INSECURITY: WITHIN THE PAST 12 MONTHS, THE FOOD YOU BOUGHT JUST DIDN'T LAST AND YOU DIDN'T HAVE MONEY TO GET MORE.: NEVER TRUE

## 2024-02-09 ASSESSMENT — PATIENT HEALTH QUESTIONNAIRE - PHQ9
5. POOR APPETITE OR OVEREATING: 0
6. FEELING BAD ABOUT YOURSELF - OR THAT YOU ARE A FAILURE OR HAVE LET YOURSELF OR YOUR FAMILY DOWN: 0
SUM OF ALL RESPONSES TO PHQ QUESTIONS 1-9: 0
1. LITTLE INTEREST OR PLEASURE IN DOING THINGS: 0
SUM OF ALL RESPONSES TO PHQ9 QUESTIONS 1 & 2: 0
SUM OF ALL RESPONSES TO PHQ QUESTIONS 1-9: 0
7. TROUBLE CONCENTRATING ON THINGS, SUCH AS READING THE NEWSPAPER OR WATCHING TELEVISION: 0
SUM OF ALL RESPONSES TO PHQ QUESTIONS 1-9: 0
9. THOUGHTS THAT YOU WOULD BE BETTER OFF DEAD, OR OF HURTING YOURSELF: 0
2. FEELING DOWN, DEPRESSED OR HOPELESS: 0
8. MOVING OR SPEAKING SO SLOWLY THAT OTHER PEOPLE COULD HAVE NOTICED. OR THE OPPOSITE, BEING SO FIGETY OR RESTLESS THAT YOU HAVE BEEN MOVING AROUND A LOT MORE THAN USUAL: 0
SUM OF ALL RESPONSES TO PHQ QUESTIONS 1-9: 0
4. FEELING TIRED OR HAVING LITTLE ENERGY: 0
3. TROUBLE FALLING OR STAYING ASLEEP: 0
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
## 2024-02-16 ENCOUNTER — HOSPITAL ENCOUNTER (OUTPATIENT)
Facility: HOSPITAL | Age: 49
Setting detail: RECURRING SERIES
Discharge: HOME | End: 2024-02-19
Payer: MEDICARE

## 2024-02-16 PROCEDURE — 97110 THERAPEUTIC EXERCISES: CPT

## 2024-02-16 PROCEDURE — 97140 MANUAL THERAPY 1/> REGIONS: CPT

## 2024-02-16 PROCEDURE — 97161 PT EVAL LOW COMPLEX 20 MIN: CPT

## 2024-02-16 NOTE — THERAPY EVALUATION
necessary.    Physician's Signature:_________________________   DATE:_________   TIME:________                           Jessica Balbuena A*    ** Signature, Date and Time must be completed for valid certification **  Please sign and fax to [phone].  Thank you

## 2024-02-23 ENCOUNTER — HOSPITAL ENCOUNTER (OUTPATIENT)
Facility: HOSPITAL | Age: 49
Setting detail: RECURRING SERIES
Discharge: HOME | End: 2024-02-26
Payer: MEDICARE

## 2024-02-23 PROCEDURE — 97140 MANUAL THERAPY 1/> REGIONS: CPT

## 2024-02-23 PROCEDURE — 97110 THERAPEUTIC EXERCISES: CPT

## 2024-02-23 NOTE — PROGRESS NOTES
will continue to benefit from skilled PT / OT services to modify and progress therapeutic interventions, analyze and address functional mobility deficits, analyze and address ROM deficits, analyze and address strength deficits, analyze and address soft tissue restrictions, analyze and cue for proper movement patterns, and analyze and modify for postural abnormalities to address functional deficits and attain remaining goals.    Progress toward goals / Updated goals:  []  See Progress Note/Recertification    Short Term Goals: To be accomplished in 4 weeks  Pt will be independent and compliant with initial HEP without cuing  Pt will achieve L cervical rotation within 50% of population norms in order to perform better at work and while driving  Pt will achieve cervical extension within 50% of population norms in order to increase functional ability  Pt will transfer sit to stand from standard chair with pain less than 5/10  Long Term Goals: To be accomplished in 12 treatments  Pt will be independent and compliant with final HEP  Pt will achieve L cervical rotation within 10% of population norms in order to perform better at work and while driving  Pt will achieve cervical extension within 10% of population norms in order to increase functional ability  Pt will perform 5x STS within 19 seconds to demonstrate increased functional ability for transfers and ADLs      PLAN  Yes  Continue plan of care  Re-Cert Due: After 12 visits or 05/16/24  [x]  Upgrade activities as tolerated  []  Discharge due to :  []  Other:      Braydon Eid, PT       2/23/2024       2:35 PM

## 2024-03-01 ENCOUNTER — HOSPITAL ENCOUNTER (OUTPATIENT)
Facility: HOSPITAL | Age: 49
Setting detail: RECURRING SERIES
Discharge: HOME | End: 2024-03-04
Payer: MEDICARE

## 2024-03-01 PROCEDURE — 97140 MANUAL THERAPY 1/> REGIONS: CPT

## 2024-03-01 PROCEDURE — 97110 THERAPEUTIC EXERCISES: CPT

## 2024-03-01 NOTE — PROGRESS NOTES
PHYSICAL THERAPY - DAILY TREATMENT NOTE (updated 3/23)      Date: 3/1/2024          Patient Name:  Tom Muniz :  1975   Medical   Diagnosis:  Other low back pain [M54.59] Treatment Diagnosis:  M54.2  NECK PAIN and M54.59  OTHER LOWER BACK PAIN    Referral Source:  Jessica Balbuena A* Insurance:   Payor: [facility] MEDICARE / Plan: [facility] MEDICARE ADVANTAGE / Product Type: *No Product type* /                     Patient  verified yes     Visit #   Current  / Total 3 12   Time   In / Out 1400 1440   Total Treatment Time 40 (10min unbilled)   Total Timed Codes 2         SUBJECTIVE    Pain Level (0-10 scale): 8/10 in mid back and neck    Any medication changes, allergies to medications, adverse drug reactions, diagnosis change, or new procedure performed?: [x] No    [] Yes (see summary sheet for update)  Medications: Verified on Patient Summary List    Subjective functional status/changes:     \"I've been much more sore this week. A couple of exercises made me sore and I tripped over something and fell last week too.\"    OBJECTIVE      Therapeutic Procedures:  Tx Min Billable or 1:1 Min (if diff from Tx Min) Procedure, Rationale, Specifics   15  60826 Therapeutic Exercise (timed):  increase ROM, strength, coordination, balance, and proprioception to improve patient's ability to progress to PLOF and address remaining functional goals. (see flow sheet as applicable)     Details if applicable:    Access Code: [email or web address]     Exercises  - Supine Hip Adduction Isometric with Ball  - 1 x daily - 15 reps  - Supine Cervical Rotation AROM on Pillow  - 1 x daily - 15 reps  - Supine Isometric Neck Rotation  - 1 x daily - 15 reps  - Seated Scapular Retraction  - 1 x daily - 15 reps   15  13560 Manual Therapy (timed):  decrease pain, increase ROM, and decrease trigger points to improve patient's ability to progress to PLOF and address remaining functional goals.  The manual therapy interventions were performed at a

## 2024-03-08 ENCOUNTER — HOSPITAL ENCOUNTER (OUTPATIENT)
Facility: HOSPITAL | Age: 49
Setting detail: RECURRING SERIES
Discharge: HOME | End: 2024-03-11
Payer: MEDICARE

## 2024-03-08 PROCEDURE — 97140 MANUAL THERAPY 1/> REGIONS: CPT

## 2024-03-08 PROCEDURE — 97110 THERAPEUTIC EXERCISES: CPT

## 2024-03-08 NOTE — PROGRESS NOTES
of care  Re-Cert Due: After 12 visits or 05/16/24  [x]  Upgrade activities as tolerated  []  Discharge due to :  []  Other:      Braydon Eid, PT       3/8/2024       2:32 PM

## 2024-03-15 ENCOUNTER — HOSPITAL ENCOUNTER (OUTPATIENT)
Facility: HOSPITAL | Age: 49
Setting detail: RECURRING SERIES
Discharge: HOME | End: 2024-03-18
Payer: MEDICARE

## 2024-03-15 PROCEDURE — 97140 MANUAL THERAPY 1/> REGIONS: CPT

## 2024-03-15 PROCEDURE — 97110 THERAPEUTIC EXERCISES: CPT

## 2024-03-15 NOTE — PROGRESS NOTES
PHYSICAL THERAPY - DAILY TREATMENT NOTE (updated 3/23)      Date: 3/15/2024          Patient Name:  Tom Muniz :  1975   Medical   Diagnosis:  Other low back pain [M54.59] Treatment Diagnosis:  M54.2  NECK PAIN and M54.59  OTHER LOWER BACK PAIN    Referral Source:  Jessica Balbuena A* Insurance:   Payor: [facility] MEDICARE / Plan: [facility] MEDICARE ADVANTAGE / Product Type: *No Product type* /                     Patient  verified yes     Visit #   Current  / Total 4 12   Time   In / Out 1315 1400   Total Treatment Time 45 (15 unbilled)   Total Timed Codes 2         SUBJECTIVE    Pain Level (0-10 scale): 8/10 in mid back and neck    Any medication changes, allergies to medications, adverse drug reactions, diagnosis change, or new procedure performed?: [x] No    [] Yes (see summary sheet for update)  Medications: Verified on Patient Summary List    Subjective functional status/changes:     \"My neck is really bad today but I think my back is a little better\"    OBJECTIVE      Therapeutic Procedures:  Tx Min Billable or 1:1 Min (if diff from Tx Min) Procedure, Rationale, Specifics   15  94684 Therapeutic Exercise (timed):  increase ROM, strength, coordination, balance, and proprioception to improve patient's ability to progress to PLOF and address remaining functional goals. (see flow sheet as applicable)     Details if applicable:    Access Code: [email or web address]     Exercises  - Supine Hip Adduction Isometric with Ball  - 1 x daily - 15 reps  - Supine Cervical Rotation AROM on Pillow  - 1 x daily - 15 reps  - Supine Isometric Neck Rotation  - 1 x daily - 15 reps  - Seated Scapular Retraction  - 1 x daily - 15 reps   15  66353 Manual Therapy (timed):  decrease pain, increase ROM, and decrease trigger points to improve patient's ability to progress to PLOF and address remaining functional goals.  The manual therapy interventions were performed at a separate and distinct time from the therapeutic activities

## 2024-03-22 ENCOUNTER — HOSPITAL ENCOUNTER (OUTPATIENT)
Facility: HOSPITAL | Age: 49
Setting detail: RECURRING SERIES
End: 2024-03-22
Payer: MEDICARE

## 2024-03-22 NOTE — PROGRESS NOTES
[facility]  [address] [place] [address] [address]  [place]  [address]    OUTPATIENT PHYSICAL THERAPY      3/22/2024:  Tom Muniz was not seen on this date for physical therapy for the following reasons:    [x]     Patient called to cancel the visit for the following reasons: schedule conflict    []     Patient missed the visit and did not call to cancel.    Braydon Eid, PT

## 2024-03-25 ENCOUNTER — HOSPITAL ENCOUNTER (OUTPATIENT)
Facility: HOSPITAL | Age: 49
Setting detail: RECURRING SERIES
Discharge: HOME | End: 2024-03-28
Payer: MEDICARE

## 2024-03-25 PROCEDURE — 97140 MANUAL THERAPY 1/> REGIONS: CPT

## 2024-03-25 NOTE — PROGRESS NOTES
PHYSICAL THERAPY - DAILY TREATMENT NOTE (updated 3/23)      Date: 3/25/2024          Patient Name:  Tom Muniz :  1975   Medical   Diagnosis:  Other low back pain [M54.59] Treatment Diagnosis:  M54.2  NECK PAIN and M54.59  OTHER LOWER BACK PAIN    Referral Source:  Jessica Balbuena A* Insurance:   Payor: [facility] MEDICARE / Plan: [facility] MEDICARE ADVANTAGE / Product Type: *No Product type* /                     Patient  verified yes     Visit #   Current  / Total 5 12   Time   In / Out 1315 1345   Total Treatment Time 30 (15 untimed)   Total Timed Codes 1         SUBJECTIVE    Pain Level (0-10 scale): 8/10 in mid back and neck    Any medication changes, allergies to medications, adverse drug reactions, diagnosis change, or new procedure performed?: [x] No    [] Yes (see summary sheet for update)  Medications: Verified on Patient Summary List    Subjective functional status/changes:         Patient reports new pain in L leg and arm. Had another fall last week, lost balance and fell. Knee and leg \"feel funny\" when applying weight to them. L knee and leg hurt from fall. Fell approximately 5 days ago.    OBJECTIVE    Patient presents 15 minutes late, session truncated    Therapeutic Procedures:  Tx Min Billable or 1:1 Min (if diff from Tx Min) Procedure, Rationale, Specifics   NT  02031 Therapeutic Exercise (timed):  increase ROM, strength, coordination, balance, and proprioception to improve patient's ability to progress to PLOF and address remaining functional goals. (see flow sheet as applicable)     Details if applicable:    Access Code: [email or web address]     Exercises  - Supine Hip Adduction Isometric with Ball  - 1 x daily - 15 reps  - Supine Cervical Rotation AROM on Pillow  - 1 x daily - 15 reps  - Supine Isometric Neck Rotation  - 1 x daily - 15 reps  - Seated Scapular Retraction  - 1 x daily - 15 reps   15  98684 Manual Therapy (timed):  decrease pain, increase ROM, and decrease trigger points to

## 2024-04-05 ENCOUNTER — HOSPITAL ENCOUNTER (OUTPATIENT)
Facility: HOSPITAL | Age: 49
Setting detail: RECURRING SERIES
Discharge: HOME | End: 2024-04-08
Payer: MEDICARE

## 2024-04-05 PROCEDURE — 97110 THERAPEUTIC EXERCISES: CPT

## 2024-04-05 PROCEDURE — 97140 MANUAL THERAPY 1/> REGIONS: CPT

## 2024-04-05 NOTE — PROGRESS NOTES
PHYSICAL THERAPY - DAILY TREATMENT NOTE (updated 3/23)      Date: 2024          Patient Name:  Tom Muniz :  1975   Medical   Diagnosis:  Other low back pain [M54.59] Treatment Diagnosis:  M54.2  NECK PAIN and M54.59  OTHER LOWER BACK PAIN    Referral Source:  Jessica Balbuena A* Insurance:   Payor: [facility] MEDICARE / Plan: [facility] MEDICARE ADVANTAGE / Product Type: *No Product type* /                     Patient  verified yes     Visit #   Current  / Total 6 12   Time   In / Out 1400 1430   Total Treatment Time 30   Total Timed Codes 2         SUBJECTIVE    Pain Level (0-10 scale): 8/10 in mid back and neck    Any medication changes, allergies to medications, adverse drug reactions, diagnosis change, or new procedure performed?: [x] No    [] Yes (see summary sheet for update)  Medications: Verified on Patient Summary List    Subjective functional status/changes:     Patient reports having a better week and no falls recently. Reported improvement in functional ability today and slight decrease in pain    OBJECTIVE      Therapeutic Procedures:  Tx Min Billable or 1:1 Min (if diff from Tx Min) Procedure, Rationale, Specifics   15  70762 Therapeutic Exercise (timed):  increase ROM, strength, coordination, balance, and proprioception to improve patient's ability to progress to PLOF and address remaining functional goals. (see flow sheet as applicable)     Details if applicable:    Access Code: [email or web address]     Exercises  - Supine Hip Adduction Isometric with Ball  - 1 x daily - 15 reps  - Supine Cervical Rotation AROM on Pillow  - 1 x daily - 15 reps  - Supine Isometric Neck Rotation  - 1 x daily - 15 reps  - Seated Scapular Retraction  - 1 x daily - 15 reps   15  30278 Manual Therapy (timed):  decrease pain, increase ROM, and decrease trigger points to improve patient's ability to progress to PLOF and address remaining functional goals.  The manual therapy interventions were performed at a

## 2024-04-15 ENCOUNTER — HOSPITAL ENCOUNTER (OUTPATIENT)
Facility: HOSPITAL | Age: 49
Setting detail: RECURRING SERIES
Discharge: HOME | End: 2024-04-18
Payer: MEDICARE

## 2024-04-15 PROCEDURE — 97140 MANUAL THERAPY 1/> REGIONS: CPT

## 2024-04-15 PROCEDURE — 97110 THERAPEUTIC EXERCISES: CPT

## 2024-04-15 NOTE — PROGRESS NOTES
trigger points to improve patient's ability to progress to PLOF and address remaining functional goals.  The manual therapy interventions were performed at a separate and distinct time from the therapeutic activities interventions . (see flow sheet as applicable)     Details if applicable:  Rotational mobilizations to cervical spine in supine with leftward PA mobilizations to advance cervical rotation. Gentle STM to bilateral cervical extensors         Details if applicable:           Details if applicable:            Details if applicable:     30     Total Total       [x]  Patient Education billed concurrently with other procedures   [x] Review HEP    [] Progressed/Changed HEP, detail:    [] Other detail:       Other Objective/Functional Measures    Taken 3/11/24    ROM Percent limited Comments   Cervical flexion 50     Cervical extension 80     Cervical R sidebend 75     Cervical L sidebend 75 P! On L   Cervical R rotation 30     Cervical L rotation 75     Trunk flexion 50    Trunk extension   Seated extension limited     Trunk R sidebend NT     Trunk L sidebend NT     Trunk R rotation 30     Trunk L rotation 30          Modalities Rationale:     decrease inflammation, decrease pain, and increase tissue extensibility to improve patient's ability to progress to PLOF and address remaining functional goals.       min [] Estim Unattended,             type/location:       []  w/ice    []  w/heat        min [] Estim Attended,             type/location:       []  w/ice   []  w/heat         []  w/US   []  TENS insruct            min []  Mechanical Traction,        type/lbs:        []  pro      []  sup           []  int       []  cont            []  before manual           []  after manual     min []  Ultrasound,         settings/location:     10 min  unbilled []  Ice     [x]  Heat            location/position: lumbar and cervical spine in prone         min []  Vasopneumatic Device,      press/temp:   pre-treatment girth :

## 2024-04-26 ENCOUNTER — HOSPITAL ENCOUNTER (OUTPATIENT)
Facility: HOSPITAL | Age: 49
Setting detail: RECURRING SERIES
End: 2024-04-26
Payer: MEDICARE

## 2024-04-29 ENCOUNTER — HOSPITAL ENCOUNTER (OUTPATIENT)
Facility: HOSPITAL | Age: 49
Setting detail: RECURRING SERIES
Discharge: HOME | End: 2024-05-02
Payer: MEDICARE

## 2024-04-29 PROCEDURE — 97110 THERAPEUTIC EXERCISES: CPT | Performed by: PHYSICAL THERAPIST

## 2024-04-29 PROCEDURE — 97140 MANUAL THERAPY 1/> REGIONS: CPT | Performed by: PHYSICAL THERAPIST

## 2024-04-29 NOTE — PROGRESS NOTES
PHYSICAL THERAPY - DAILY TREATMENT NOTE (updated 3/23)      Date: 2024          Patient Name:  Tom Muniz :  1975   Medical   Diagnosis:  Other low back pain [M54.59] Treatment Diagnosis:  M54.2  NECK PAIN and M54.59  OTHER LOWER BACK PAIN    Referral Source:  Jessica Balbuena A* Insurance:   Payor: [facility] MEDICARE / Plan: [facility] MEDICARE ADVANTAGE / Product Type: *No Product type* /                     Patient  verified yes     Visit #   Current  / Total 9 12   Time   In / Out 1228 1258   Total Treatment Time 30   Total Timed Codes 2         SUBJECTIVE    Pain Level (0-10 scale): 7/10 in L neck    Any medication changes, allergies to medications, adverse drug reactions, diagnosis change, or new procedure performed?: [x] No    [] Yes (see summary sheet for update)  Medications: Verified on Patient Summary List    Subjective functional status/changes:     \"My back is mostly alright, but I can't hardly move my neck it hurts so bad.\"    Patient also notes occasional pain radiating into proximal LUE and L  weakness.       OBJECTIVE      Therapeutic Procedures:  Tx Min Billable or 1:1 Min (if diff from Tx Min) Procedure, Rationale, Specifics   10  46177 Therapeutic Exercise (timed):  increase ROM, strength, coordination, balance, and proprioception to improve patient's ability to progress to PLOF and address remaining functional goals. (see flow sheet as applicable)     Details if applicable:    Access Code: [email or web address]  Exercises  - (held)Supine Hip Adduction Isometric with Ball  - 15 reps  - Supine Cervical Rotation AROM on Pillow  - 15 reps  - Supine Isometric Neck Rotation   - 15 reps  - Seated Trunk Rotation - Arms Crossed  - 15 reps  - (held)Seated Active Hip Flexion   - 15 reps  - Modified sidelying Open Book - 15 reps  - Levator scapulae stretch -  5 reps - 10 sec hold  - Upper trap stretch -  5 reps - 10 sec hold  - Seated Rows with red band - 2 sets - 10 reps   20  91672 Manual

## 2024-05-14 ENCOUNTER — OFFICE VISIT (OUTPATIENT)
Facility: CLINIC | Age: 49
End: 2024-05-14
Payer: MEDICARE

## 2024-05-14 ENCOUNTER — HOSPITAL ENCOUNTER (OUTPATIENT)
Facility: HOSPITAL | Age: 49
Setting detail: RECURRING SERIES
Discharge: HOME | End: 2024-05-17
Payer: MEDICARE

## 2024-05-14 VITALS
HEIGHT: 70 IN | HEART RATE: 86 BPM | SYSTOLIC BLOOD PRESSURE: 131 MMHG | OXYGEN SATURATION: 98 % | RESPIRATION RATE: 18 BRPM | BODY MASS INDEX: 43.09 KG/M2 | DIASTOLIC BLOOD PRESSURE: 87 MMHG | TEMPERATURE: 98.7 F | WEIGHT: 301 LBS

## 2024-05-14 DIAGNOSIS — C05.9: ICD-10-CM

## 2024-05-14 DIAGNOSIS — I10 PRIMARY HYPERTENSION: ICD-10-CM

## 2024-05-14 DIAGNOSIS — R35.1 NOCTURIA: ICD-10-CM

## 2024-05-14 DIAGNOSIS — Z00.00 MEDICARE ANNUAL WELLNESS VISIT, SUBSEQUENT: Primary | ICD-10-CM

## 2024-05-14 DIAGNOSIS — Z11.3 SCREENING EXAMINATION FOR STD (SEXUALLY TRANSMITTED DISEASE): ICD-10-CM

## 2024-05-14 DIAGNOSIS — E66.01 OBESITY, CLASS III, BMI 40-49.9 (MORBID OBESITY) (HCC): ICD-10-CM

## 2024-05-14 DIAGNOSIS — E78.2 MIXED HYPERLIPIDEMIA: ICD-10-CM

## 2024-05-14 DIAGNOSIS — E03.9 HYPOTHYROIDISM, UNSPECIFIED TYPE: ICD-10-CM

## 2024-05-14 LAB
ALBUMIN SERPL-MCNC: 3.6 G/DL (ref 3.5–5)
ALBUMIN/GLOB SERPL: 0.9 (ref 1.1–2.2)
ALP SERPL-CCNC: 82 U/L (ref 45–117)
ALT SERPL-CCNC: 28 U/L (ref 12–78)
ANION GAP SERPL CALC-SCNC: 5 MMOL/L (ref 5–15)
AST SERPL-CCNC: 23 U/L (ref 15–37)
BILIRUB SERPL-MCNC: 0.4 MG/DL (ref 0.2–1)
BUN SERPL-MCNC: 17 MG/DL (ref 6–20)
BUN/CREAT SERPL: 12 (ref 12–20)
CALCIUM SERPL-MCNC: 9.1 MG/DL (ref 8.5–10.1)
CHLORIDE SERPL-SCNC: 108 MMOL/L (ref 97–108)
CHOLEST SERPL-MCNC: 176 MG/DL
CO2 SERPL-SCNC: 25 MMOL/L (ref 21–32)
CREAT SERPL-MCNC: 1.42 MG/DL (ref 0.7–1.3)
ERYTHROCYTE [DISTWIDTH] IN BLOOD BY AUTOMATED COUNT: 14 % (ref 11.5–14.5)
GLOBULIN SER CALC-MCNC: 4 G/DL (ref 2–4)
GLUCOSE SERPL-MCNC: 86 MG/DL (ref 65–100)
HCT VFR BLD AUTO: 42.5 % (ref 36.6–50.3)
HDLC SERPL-MCNC: 53 MG/DL
HDLC SERPL: 3.3 (ref 0–5)
HGB BLD-MCNC: 14.3 G/DL (ref 12.1–17)
LDLC SERPL CALC-MCNC: 100.4 MG/DL (ref 0–100)
MCH RBC QN AUTO: 29.5 PG (ref 26–34)
MCHC RBC AUTO-ENTMCNC: 33.6 G/DL (ref 30–36.5)
MCV RBC AUTO: 87.6 FL (ref 80–99)
NRBC # BLD: 0 K/UL (ref 0–0.01)
NRBC BLD-RTO: 0 PER 100 WBC
PLATELET # BLD AUTO: 221 K/UL (ref 150–400)
PMV BLD AUTO: 9.9 FL (ref 8.9–12.9)
POTASSIUM SERPL-SCNC: 4.4 MMOL/L (ref 3.5–5.1)
PROT SERPL-MCNC: 7.6 G/DL (ref 6.4–8.2)
RBC # BLD AUTO: 4.85 M/UL (ref 4.1–5.7)
SODIUM SERPL-SCNC: 138 MMOL/L (ref 136–145)
TRIGL SERPL-MCNC: 113 MG/DL
VLDLC SERPL CALC-MCNC: 22.6 MG/DL
WBC # BLD AUTO: 6.8 K/UL (ref 4.1–11.1)

## 2024-05-14 PROCEDURE — 3075F SYST BP GE 130 - 139MM HG: CPT | Performed by: NURSE PRACTITIONER

## 2024-05-14 PROCEDURE — G0439 PPPS, SUBSEQ VISIT: HCPCS | Performed by: NURSE PRACTITIONER

## 2024-05-14 PROCEDURE — 3079F DIAST BP 80-89 MM HG: CPT | Performed by: NURSE PRACTITIONER

## 2024-05-14 PROCEDURE — 97140 MANUAL THERAPY 1/> REGIONS: CPT

## 2024-05-14 PROCEDURE — 99214 OFFICE O/P EST MOD 30 MIN: CPT | Performed by: NURSE PRACTITIONER

## 2024-05-14 PROCEDURE — 97110 THERAPEUTIC EXERCISES: CPT

## 2024-05-14 RX ORDER — AMLODIPINE BESYLATE 10 MG/1
10 TABLET ORAL DAILY
Qty: 90 TABLET | Refills: 1 | Status: SHIPPED | OUTPATIENT
Start: 2024-05-14

## 2024-05-14 RX ORDER — ATORVASTATIN CALCIUM 10 MG/1
10 TABLET, FILM COATED ORAL NIGHTLY
Qty: 90 TABLET | Refills: 2 | Status: SHIPPED | OUTPATIENT
Start: 2024-05-14

## 2024-05-14 RX ORDER — ACETAMINOPHEN 325 MG/1
325 TABLET ORAL EVERY 4 HOURS PRN
Qty: 120 TABLET | Refills: 1 | Status: SHIPPED | OUTPATIENT
Start: 2024-05-14

## 2024-05-14 ASSESSMENT — PATIENT HEALTH QUESTIONNAIRE - PHQ9
SUM OF ALL RESPONSES TO PHQ QUESTIONS 1-9: 4
9. THOUGHTS THAT YOU WOULD BE BETTER OFF DEAD, OR OF HURTING YOURSELF: NOT AT ALL
1. LITTLE INTEREST OR PLEASURE IN DOING THINGS: NOT AT ALL
8. MOVING OR SPEAKING SO SLOWLY THAT OTHER PEOPLE COULD HAVE NOTICED. OR THE OPPOSITE, BEING SO FIGETY OR RESTLESS THAT YOU HAVE BEEN MOVING AROUND A LOT MORE THAN USUAL: NOT AT ALL
6. FEELING BAD ABOUT YOURSELF - OR THAT YOU ARE A FAILURE OR HAVE LET YOURSELF OR YOUR FAMILY DOWN: NOT AT ALL
4. FEELING TIRED OR HAVING LITTLE ENERGY: SEVERAL DAYS
2. FEELING DOWN, DEPRESSED OR HOPELESS: NOT AT ALL
10. IF YOU CHECKED OFF ANY PROBLEMS, HOW DIFFICULT HAVE THESE PROBLEMS MADE IT FOR YOU TO DO YOUR WORK, TAKE CARE OF THINGS AT HOME, OR GET ALONG WITH OTHER PEOPLE: NOT DIFFICULT AT ALL
SUM OF ALL RESPONSES TO PHQ QUESTIONS 1-9: 4
5. POOR APPETITE OR OVEREATING: NOT AT ALL
SUM OF ALL RESPONSES TO PHQ9 QUESTIONS 1 & 2: 0
7. TROUBLE CONCENTRATING ON THINGS, SUCH AS READING THE NEWSPAPER OR WATCHING TELEVISION: NOT AT ALL
3. TROUBLE FALLING OR STAYING ASLEEP: NEARLY EVERY DAY

## 2024-05-14 ASSESSMENT — LIFESTYLE VARIABLES
HOW OFTEN DO YOU HAVE A DRINK CONTAINING ALCOHOL: MONTHLY OR LESS
HOW MANY STANDARD DRINKS CONTAINING ALCOHOL DO YOU HAVE ON A TYPICAL DAY: 1 OR 2

## 2024-05-14 NOTE — PROGRESS NOTES
\"Have you been to the ER, urgent care clinic since your last visit?  Hospitalized since your last visit?\"    NO    “Have you seen or consulted any other health care providers outside of [facility] since your last visit?”    NO            Click Here for Release of Records Request  /87 (Site: Left Upper Arm, Position: Sitting, Cuff Size: Large Adult)   Pulse 86   Temp 98.7 °F (37.1 °C)   Resp 18   Ht 1.778 m (5' 10\")   Wt (!) 136.5 kg (301 lb)   SpO2 98%   BMI 43.19 kg/m²   Chief Complaint   Patient presents with    Medicare AWV     Pt states he is having pain in his neck and back level is 8    Hypertension

## 2024-05-14 NOTE — PROGRESS NOTES
Subjective: (As above and below)     Chief Complaint   Patient presents with    Medicare AWV     Pt states he is having pain in his neck and back level is 8    Hypertension     Tom Muniz is a 48 y.o. year old male who presents for     Hypertension ROS:  taking medications as instructed, no medication side effects noted, no TIAs, no chest pain on exertion, no dyspnea on exertion, no swelling of ankles    BP Readings from Last 3 Encounters:   24 131/87   23 120/75   22 (!) 155/93       Hyperlipidemia tolerating statin    ED sildenafil works w/o a/e      Hypothyroidism med adherent    Wt Readings from Last 3 Encounters:   24 (!) 136.5 kg (301 lb)   23 135.2 kg (298 lb)   22 136.1 kg (300 lb)       Hx of cancer of palate: follows w heme-onc    Prostatic s/s: voids a few x per night, no other prostatic s/s      Reviewed PmHx, RxHx, FmHx, SocHx, AllgHx and updated in chart.  History reviewed. No pertinent family history.    Past Medical History:   Diagnosis Date    Tonsillar abscess 2018    Tonsillar cancer (HCC)     dx 2018      Social History     Socioeconomic History    Marital status: Single     Spouse name: None    Number of children: None    Years of education: None    Highest education level: None   Tobacco Use    Smoking status: Former     Current packs/day: 0.00     Types: Cigarettes     Quit date: 2017     Years since quittin.8    Smokeless tobacco: Never   Substance and Sexual Activity    Alcohol use: No    Drug use: Yes     Types: Marijuana (Weed)    Sexual activity: Yes     Partners: Female     Birth control/protection: None     Social Determinants of Health     Financial Resource Strain: Low Risk  (2024)    Overall Financial Resource Strain (CARDIA)     Difficulty of Paying Living Expenses: Not hard at all   Food Insecurity: No Food Insecurity (2024)    Hunger Vital Sign     Worried About Running Out of Food in the Last Year: Never true

## 2024-05-14 NOTE — PATIENT INSTRUCTIONS
medicines. These can increase your chances of quitting for good. Quitting is one of the most important things you can do to protect your heart. It is never too late to quit. Try to avoid secondhand smoke too.     Stay at a weight that's healthy for you. Talk to your doctor if you need help losing weight.     Try to get 7 to 9 hours of sleep each night.     Limit alcohol to 2 drinks a day for men and 1 drink a day for women. Too much alcohol can cause health problems.     Manage other health problems such as diabetes, high blood pressure, and high cholesterol. If you think you may have a problem with alcohol or drug use, talk to your doctor.   Medicines    Take your medicines exactly as prescribed. Call your doctor if you think you are having a problem with your medicine.     If your doctor recommends aspirin, take the amount directed each day. Make sure you take aspirin and not another kind of pain reliever, such as acetaminophen (Tylenol).   When should you call for help?   Call 911 if you have symptoms of a heart attack. These may include:    Chest pain or pressure, or a strange feeling in the chest.     Sweating.     Shortness of breath.     Pain, pressure, or a strange feeling in the back, neck, jaw, or upper belly or in one or both shoulders or arms.     Lightheadedness or sudden weakness.     A fast or irregular heartbeat.   After you call 911, the  may tell you to chew 1 adult-strength or 2 to 4 low-dose aspirin. Wait for an ambulance. Do not try to drive yourself.  Watch closely for changes in your health, and be sure to contact your doctor if you have any problems.  Where can you learn more?  Go to https://www.[employer].net/patientEd and enter F075 to learn more about \"A Healthy Heart: Care Instructions.\"  Current as of: June 24, 2023               Content Version: 14.0  © 4195-6034 [employer].   Care instructions adapted under license by [employer]. If you have questions about a

## 2024-05-14 NOTE — PROGRESS NOTES
norms in order to perform better at work and while driving  Pt will achieve cervical extension within 10% of population norms in order to increase functional ability  Pt will perform 5x STS within 19 seconds to demonstrate increased functional ability for transfers and ADLs      PLAN  Yes  Continue plan of care  Re-Cert Due: After 12 visits or 05/16/24  [x]  Upgrade activities as tolerated  []  Discharge due to :  []  Other:      Braydon Eid, PT       5/14/2024       10:04 AM

## 2024-05-15 LAB
HCV AB SERPL QL IA: NORMAL
HCV IGG SERPL QL IA: NON REACTIVE S/CO RATIO

## 2024-05-16 LAB
C TRACH RRNA SPEC QL NAA+PROBE: NEGATIVE
Lab: NORMAL
N GONORRHOEA RRNA SPEC QL NAA+PROBE: NEGATIVE
PSA SERPL-MCNC: 0.4 NG/ML (ref 0–4)
REFLEX CRITERIA: NORMAL
SPECIMEN SOURCE: NORMAL
T VAGINALIS RRNA SPEC QL NAA+PROBE: NEGATIVE
T4 FREE SERPL-MCNC: 1 NG/DL (ref 0.82–1.77)
THYROID PEROXIDASE ANTIBODY: 15 IU/ML (ref 0–34)
TSH SERPL DL<=0.05 MIU/L-ACNC: 5.34 UIU/ML (ref 0.45–4.5)

## 2024-05-24 ENCOUNTER — HOSPITAL ENCOUNTER (OUTPATIENT)
Facility: HOSPITAL | Age: 49
Setting detail: RECURRING SERIES
Discharge: HOME | End: 2024-05-27
Payer: MEDICARE

## 2024-05-24 PROCEDURE — 97110 THERAPEUTIC EXERCISES: CPT

## 2024-05-24 NOTE — PROGRESS NOTES
Other:        Skin assessment post-treatment (if applicable):    []  intact    []  redness- no adverse reaction                 []redness - adverse reaction:           Pain Level at end of session (0-10 scale): 6/10        Assessment   Patient showed fair response to treatment with slight in pain and improved cervical AROM but continues to progress very slowly. Patient continues to show soft tissue mobility, joint mobility, strength, postural, and motor coordination impairments that are contributing to functional limitations in lifting and driving. Patient will continue to benefit from skilled Physical Therapy intervention to address listed impairments to allow for return to prior level of fcuntion.     Patient will continue to benefit from skilled PT / OT services to modify and progress therapeutic interventions, analyze and address functional mobility deficits, analyze and address ROM deficits, analyze and address strength deficits, analyze and address soft tissue restrictions, analyze and cue for proper movement patterns, and analyze and modify for postural abnormalities to address functional deficits and attain remaining goals.    Progress toward goals / Updated goals:  []  See Progress Note/Recertification    Short Term Goals: To be accomplished in 4 weeks  Pt will be independent and compliant with initial HEP without cuing  Pt will achieve L cervical rotation within 50% of population norms in order to perform better at work and while driving PROGRESSING  Pt will achieve cervical extension within 50% of population norms in order to increase functional ability MET  Pt will transfer sit to stand from standard chair with pain less than 5/10 PROGRESSING  Long Term Goals: To be accomplished in 12 treatments  Pt will be independent and compliant with final HEP  Pt will achieve L cervical rotation within 10% of population norms in order to perform better at work and while driving  Pt will achieve cervical extension

## 2024-06-27 ENCOUNTER — HOSPITAL ENCOUNTER (OUTPATIENT)
Facility: HOSPITAL | Age: 49
Setting detail: RECURRING SERIES
Discharge: HOME | End: 2024-06-30
Payer: MEDICARE

## 2024-06-27 PROCEDURE — 97110 THERAPEUTIC EXERCISES: CPT

## 2024-06-27 NOTE — PROGRESS NOTES
PHYSICAL THERAPY - DAILY TREATMENT NOTE (updated 3/23)      Date: 2024          Patient Name:  Tom Muniz :  1975   Medical   Diagnosis:  Other low back pain [M54.59] Treatment Diagnosis:  M54.2  NECK PAIN and M54.59  OTHER LOWER BACK PAIN    Referral Source:  Jessica Balbuena A* Insurance:   Payor: [facility] MEDICARE / Plan: [facility] MEDICARE ADVANTAGE / Product Type: *No Product type* /                     Patient  verified yes     Visit #   Current  / Total 12 12   Time   In / Out 1350 1425   Total Treatment Time 35   Total Timed Codes 1         SUBJECTIVE    Pain Level (0-10 scale): 8/10 in L neck    Any medication changes, allergies to medications, adverse drug reactions, diagnosis change, or new procedure performed?: [x] No    [] Yes (see summary sheet for update)  Medications: Verified on Patient Summary List    Subjective functional status/changes:     \"I've been dealing with a death in the family. My neck is really tight and my back pain has moved down my leg. I can sit for about 15 minutes before the pain gets worse.\"            OBJECTIVE     Measured   ROM Percent limited Comments   Cervical flexion 30     Cervical extension 50     Cervical R sidebend 75  P! On L   Cervical L sidebend 40 P! On L   Cervical R rotation 25     Cervical L rotation 75  P! On L   Trunk flexion 50    Trunk extension 50     Trunk R sidebend 25     Trunk L sidebend 25     Trunk R rotation 50     Trunk L rotation 50           Therapeutic Procedures:  Tx Min Billable or 1:1 Min (if diff from Tx Min) Procedure, Rationale, Specifics   15  92236 Therapeutic Exercise (timed):  increase ROM, strength, coordination, balance, and proprioception to improve patient's ability to progress to PLOF and address remaining functional goals. (see flow sheet as applicable)     Details if applicable:    Access Code: [email or web address]  Exercises  NT - Supine Hip Adduction Isometric with Ball  - 1 x daily - 15 reps  NT - Supine

## 2024-06-30 ENCOUNTER — HOSPITAL ENCOUNTER (EMERGENCY)
Facility: HOSPITAL | Age: 49
Discharge: HOME | End: 2024-06-30
Attending: EMERGENCY MEDICINE
Payer: MEDICARE

## 2024-06-30 VITALS
SYSTOLIC BLOOD PRESSURE: 149 MMHG | BODY MASS INDEX: 41.66 KG/M2 | RESPIRATION RATE: 18 BRPM | HEART RATE: 99 BPM | WEIGHT: 291 LBS | TEMPERATURE: 97.8 F | OXYGEN SATURATION: 97 % | DIASTOLIC BLOOD PRESSURE: 97 MMHG | HEIGHT: 70 IN

## 2024-06-30 DIAGNOSIS — R68.84 JAW PAIN: ICD-10-CM

## 2024-06-30 DIAGNOSIS — I88.9 LYMPHADENITIS: ICD-10-CM

## 2024-06-30 DIAGNOSIS — G50.1 ATYPICAL FACIAL PAIN: ICD-10-CM

## 2024-06-30 DIAGNOSIS — R51.9 ACUTE FACIAL PAIN: Primary | ICD-10-CM

## 2024-06-30 PROCEDURE — 6370000000 HC RX 637 (ALT 250 FOR IP): Performed by: EMERGENCY MEDICINE

## 2024-06-30 PROCEDURE — 99283 EMERGENCY DEPT VISIT LOW MDM: CPT

## 2024-06-30 RX ORDER — CARBAMAZEPINE 200 MG/1
200 TABLET, EXTENDED RELEASE ORAL ONCE
Status: DISCONTINUED | OUTPATIENT
Start: 2024-06-30 | End: 2024-06-30

## 2024-06-30 RX ORDER — OXYCODONE HYDROCHLORIDE AND ACETAMINOPHEN 5; 325 MG/1; MG/1
1 TABLET ORAL EVERY 6 HOURS PRN
Qty: 12 TABLET | Refills: 0 | Status: SHIPPED | OUTPATIENT
Start: 2024-06-30 | End: 2024-07-03

## 2024-06-30 RX ORDER — OXYCODONE HYDROCHLORIDE AND ACETAMINOPHEN 5; 325 MG/1; MG/1
1 TABLET ORAL
Status: COMPLETED | OUTPATIENT
Start: 2024-06-30 | End: 2024-06-30

## 2024-06-30 RX ORDER — AMOXICILLIN AND CLAVULANATE POTASSIUM 875; 125 MG/1; MG/1
1 TABLET, FILM COATED ORAL 2 TIMES DAILY
Qty: 14 TABLET | Refills: 0 | Status: SHIPPED | OUTPATIENT
Start: 2024-06-30 | End: 2024-07-07

## 2024-06-30 RX ORDER — CARBAMAZEPINE 200 MG/1
200 CAPSULE, EXTENDED RELEASE ORAL ONCE
Status: DISCONTINUED | OUTPATIENT
Start: 2024-06-30 | End: 2024-06-30 | Stop reason: CLARIF

## 2024-06-30 RX ORDER — AMOXICILLIN AND CLAVULANATE POTASSIUM 875; 125 MG/1; MG/1
1 TABLET, FILM COATED ORAL
Status: COMPLETED | OUTPATIENT
Start: 2024-06-30 | End: 2024-06-30

## 2024-06-30 RX ADMIN — OXYCODONE HYDROCHLORIDE AND ACETAMINOPHEN 1 TABLET: 5; 325 TABLET ORAL at 02:02

## 2024-06-30 RX ADMIN — AMOXICILLIN AND CLAVULANATE POTASSIUM 1 TABLET: 875; 125 TABLET, FILM COATED ORAL at 02:38

## 2024-06-30 ASSESSMENT — PAIN DESCRIPTION - LOCATION: LOCATION: JAW

## 2024-06-30 ASSESSMENT — PAIN SCALES - GENERAL: PAINLEVEL_OUTOF10: 10

## 2024-06-30 ASSESSMENT — PAIN - FUNCTIONAL ASSESSMENT: PAIN_FUNCTIONAL_ASSESSMENT: 0-10

## 2024-06-30 NOTE — ED TRIAGE NOTES
Pt presents to ED with left sided jaw pain that radiates to his ear. PT has a HX of throat cancer. He was last treated for cancer in 2019. Pt reports his jaw pain is similar to the pain he experienced when he was diagnosed with cancer. Pt reports he has had this pain for 1 week.

## 2024-06-30 NOTE — ED NOTES
MD Madera at bedside reviewing patient's discharge instructions and reviewing medications. Patient ambulatory home. Patient in no apparent distress.

## 2024-06-30 NOTE — DISCHARGE INSTRUCTIONS
Thank You!    It was a pleasure taking care of you in our Emergency Department today. We know that when you come to [facility], you are entrusting us with your health, comfort, and safety. Our physicians and nurses honor that trust, and truly appreciate the opportunity to care for you and your loved ones.      We also value your feedback. If you receive a survey about your Emergency Department experience today, please fill it out.  We care about our patients' feedback, and we listen to what you have to say. Thank you.    Dr. Elvin Madera M.D.      ____________________________________________________________________  I have included a copy of your lab results and/or radiologic studies from today's visit so you can have them easily available at your follow-up visit. We hope you feel better and please do not hesitate to contact the ED if you have any questions at all!    No results found for this or any previous visit (from the past 12 hour(s)).    No orders to display     [unfilled]  The exam and treatment you received in the Emergency Department were for an urgent problem and are not intended as complete care. It is important that you follow up with a doctor, nurse practitioner, or physician assistant for ongoing care. If your symptoms become worse or you do not improve as expected and you are unable to reach your usual health care provider, you should return to the Emergency Department. We are available 24 hours a day.    Please take your discharge instructions with you when you go to your follow-up appointment.     If a prescription has been provided, please have it filled as soon as possible to prevent a delay in treatment. Read the entire medication instruction sheet provided to you by the pharmacy. If you have any questions or reservations about taking the medication due to side effects or interactions with other medications, please call your primary care physician or contact the ER to

## 2024-06-30 NOTE — ED PROVIDER NOTES
EMERGENCY DEPARTMENT HISTORY AND PHYSICAL EXAM            Please note that this dictation was completed with the assistance of \"Dragon\", the computer voice recognition software. Quite often unanticipated grammatical, syntax, homophones, and other interpretive errors are inadvertently transcribed by the computer software. Please disregard these errors and any errors that have escaped final proofreading. Thank you.    Date of Evaluation: 06/30/24  Patient: Tom Muniz  Patient Age and Sex: 48 y.o. male   MRN: [id number]  CSN: [id number]  PCP: Jessica Balbuena APRN - NP    History of Present Illness     Chief Complaint   Patient presents with    Jaw Pain     left     History Provided By: Patient/family/EMS (if available)    History is limited by: Nothing     HPI: Tom Muniz, 48 y.o. male with past medical history as documented below presents to the ED with c/o of sudden onset of 2 to 3 days of left jaw pain with concern for enlarged lymph node.  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  Patient is followed by Dr. Zabala with radiation oncology at [facility].  He does have an appointment coming up.  Patient had a recent CT imaging that was unremarkable for new disease.  Patient states that he was recently seen at [facility] hospital and was diagnosed with trigeminal neuralgia and pain medications but he has finished a course.  Denies any difficulty swallowing or voice changes.  Denies any weight loss.. Pt denies any other exacerbating or ameliorating factors. There are no other complaints, changes or physical findings pertinent to the HPI at this time.    Nursing notes were all reviewed and agreed with or any disagreements were addressed in the HPI.    Past History   Past Medical History:  Past Medical History:   Diagnosis Date    Tonsillar abscess 05/2018    Tonsillar cancer (HCC)     dx 6/2018       Past Surgical History:  Past Surgical History:   Procedure

## 2024-07-02 ENCOUNTER — HOSPITAL ENCOUNTER (OUTPATIENT)
Facility: HOSPITAL | Age: 49
Setting detail: RECURRING SERIES
Discharge: HOME | End: 2024-07-05
Payer: MEDICARE

## 2024-07-02 PROCEDURE — 97140 MANUAL THERAPY 1/> REGIONS: CPT

## 2024-07-02 NOTE — PROGRESS NOTES
PHYSICAL THERAPY - DAILY TREATMENT NOTE (updated 3/23)      Date: 2024          Patient Name:  Tom Muniz :  1975   Medical   Diagnosis:  Other low back pain [M54.59] Treatment Diagnosis:  M54.2  NECK PAIN and M54.59  OTHER LOWER BACK PAIN    Referral Source:  Jessica Balbuena A* Insurance:   Payor: [facility] MEDICARE / Plan: [employer] DUAL COMPLETE / Product Type: *No Product type* /                     Patient  verified yes     Visit #   Current  / Total 13    Time   In / Out 1430    Total Treatment Time    Total Timed Codes 1         SUBJECTIVE    Pain Level (0-10 scale): 8/10 in L neck    Any medication changes, allergies to medications, adverse drug reactions, diagnosis change, or new procedure performed?: [x] No    [] Yes (see summary sheet for update)  Medications: Verified on Patient Summary List    Subjective functional status/changes:     \"Hanging in there. My neck is sore. I've been moving it a lot but it still hurts on the side when I do it. My neck is sore but it's not too bad right now.\"            OBJECTIVE     Measured   ROM Percent limited Comments   Cervical flexion 30     Cervical extension 50     Cervical R sidebend 75  P! On L   Cervical L sidebend 40 P! On L   Cervical R rotation 25     Cervical L rotation 75  P! On L   Trunk flexion 50    Trunk extension 50     Trunk R sidebend 25     Trunk L sidebend 25     Trunk R rotation 50     Trunk L rotation 50           Therapeutic Procedures:  Tx Min Billable or 1:1 Min (if diff from Tx Min) Procedure, Rationale, Specifics   15  [id number] Therapeutic Exercise (timed):  increase ROM, strength, coordination, balance, and proprioception to improve patient's ability to progress to PLOF and address remaining functional goals. (see flow sheet as applicable)     Details if applicable:    Access Code: [email or web address]  Exercises  NT - Supine Hip Adduction Isometric with Ball  - 1 x daily - 15 reps  NT - Supine Cervical Rotation AROM on

## 2024-07-09 ENCOUNTER — HOSPITAL ENCOUNTER (OUTPATIENT)
Facility: HOSPITAL | Age: 49
Setting detail: RECURRING SERIES
Discharge: HOME | End: 2024-07-12
Payer: MEDICARE

## 2024-07-09 PROCEDURE — 97140 MANUAL THERAPY 1/> REGIONS: CPT

## 2024-07-09 NOTE — PROGRESS NOTES
Cervical Rotation AROM on Pillow  - 1 x daily - 15 reps  NT - Supine Isometric Neck Rotation  - 1 x daily - 15 reps  - Seated Active Hip Flexion  - 1 x daily - 15 reps  - 3 Finger Cervical Rotation  - 1 x daily - 15 reps  - Seated Assisted Cervical Rotation with Towel  - 1 x daily - 15 reps  - Seated Sidebending  - 1 x daily - 15 reps   15  [id number] Manual Therapy (timed):  decrease pain, increase ROM, and decrease trigger points to improve patient's ability to progress to PLOF and address remaining functional goals.  The manual therapy interventions were performed at a separate and distinct time from the therapeutic activities interventions . (see flow sheet as applicable)     Details if applicable:    STM of L SCM and cervical paraspinal mm  C3-7 UPA with PROM in rotation  Seated SNAGs C3-7 with L rotation   15     Total Total       [x]  Patient Education billed concurrently with other procedures   [x] Review HEP    [] Progressed/Changed HEP, detail:    [] Other detail:       Other Objective/Functional Measures        Modalities Rationale:     decrease inflammation, decrease pain, and increase tissue extensibility to improve patient's ability to progress to PLOF and address remaining functional goals.       min [] Estim Unattended,             type/location:       []  w/ice    []  w/heat        min [] Estim Attended,             type/location:       []  w/ice   []  w/heat         []  w/US   []  TENS insruct            min []  Mechanical Traction,        type/lbs:        []  pro      []  sup           []  int       []  cont            []  before manual           []  after manual     min []  Ultrasound,         settings/location:     15 min  unbilled []  Ice     [x]  Heat            location/position: lumbar and cervical spine in prone         min []  Vasopneumatic Device,      press/temp:   pre-treatment girth :    post-treatment girth :    measured at (landmark       location) :   If using vaso (only need to measure

## 2024-07-19 ENCOUNTER — OFFICE VISIT (OUTPATIENT)
Facility: CLINIC | Age: 49
End: 2024-07-19
Payer: MEDICARE

## 2024-07-19 VITALS
BODY MASS INDEX: 43.91 KG/M2 | HEIGHT: 70 IN | RESPIRATION RATE: 18 BRPM | TEMPERATURE: 95.8 F | HEART RATE: 67 BPM | DIASTOLIC BLOOD PRESSURE: 91 MMHG | SYSTOLIC BLOOD PRESSURE: 133 MMHG | WEIGHT: 306.7 LBS

## 2024-07-19 DIAGNOSIS — M79.2 NEUROPATHIC PAIN: ICD-10-CM

## 2024-07-19 DIAGNOSIS — C09.1 MALIGNANT NEOPLASM OF TONSILLAR PILLARS (ANTERIOR) (POSTERIOR) (HCC): Primary | ICD-10-CM

## 2024-07-19 DIAGNOSIS — R73.09 ELEVATED GLUCOSE: ICD-10-CM

## 2024-07-19 DIAGNOSIS — E03.9 ACQUIRED HYPOTHYROIDISM: ICD-10-CM

## 2024-07-19 DIAGNOSIS — I10 PRIMARY HYPERTENSION: ICD-10-CM

## 2024-07-19 DIAGNOSIS — C09.1 MALIGNANT NEOPLASM OF TONSILLAR PILLARS (ANTERIOR) (POSTERIOR) (HCC): ICD-10-CM

## 2024-07-19 DIAGNOSIS — M54.50 CHRONIC MIDLINE LOW BACK PAIN WITHOUT SCIATICA: ICD-10-CM

## 2024-07-19 DIAGNOSIS — G89.29 CHRONIC MIDLINE LOW BACK PAIN WITHOUT SCIATICA: ICD-10-CM

## 2024-07-19 LAB
ALBUMIN SERPL-MCNC: 3.9 G/DL (ref 3.5–5)
ALBUMIN/GLOB SERPL: 1.1 (ref 1.1–2.2)
ALP SERPL-CCNC: 86 U/L (ref 45–117)
ALT SERPL-CCNC: 27 U/L (ref 12–78)
ANION GAP SERPL CALC-SCNC: 6 MMOL/L (ref 5–15)
AST SERPL-CCNC: 21 U/L (ref 15–37)
BASOPHILS # BLD: 0 K/UL (ref 0–0.1)
BASOPHILS NFR BLD: 0 % (ref 0–1)
BILIRUB SERPL-MCNC: 0.3 MG/DL (ref 0.2–1)
BUN SERPL-MCNC: 17 MG/DL (ref 6–20)
BUN/CREAT SERPL: 12 (ref 12–20)
CALCIUM SERPL-MCNC: 8.9 MG/DL (ref 8.5–10.1)
CHLORIDE SERPL-SCNC: 106 MMOL/L (ref 97–108)
CO2 SERPL-SCNC: 27 MMOL/L (ref 21–32)
CREAT SERPL-MCNC: 1.43 MG/DL (ref 0.7–1.3)
DIFFERENTIAL METHOD BLD: NORMAL
EOSINOPHIL # BLD: 0.2 K/UL (ref 0–0.4)
EOSINOPHIL NFR BLD: 4 % (ref 0–7)
ERYTHROCYTE [DISTWIDTH] IN BLOOD BY AUTOMATED COUNT: 14.2 % (ref 11.5–14.5)
EST. AVERAGE GLUCOSE BLD GHB EST-MCNC: 120 MG/DL
GLOBULIN SER CALC-MCNC: 3.7 G/DL (ref 2–4)
GLUCOSE SERPL-MCNC: 102 MG/DL (ref 65–100)
HBA1C MFR BLD: 5.8 % (ref 4–5.6)
HCT VFR BLD AUTO: 43.2 % (ref 36.6–50.3)
HGB BLD-MCNC: 14.6 G/DL (ref 12.1–17)
IMM GRANULOCYTES # BLD AUTO: 0 K/UL (ref 0–0.04)
IMM GRANULOCYTES NFR BLD AUTO: 0 % (ref 0–0.5)
LYMPHOCYTES # BLD: 1.9 K/UL (ref 0.8–3.5)
LYMPHOCYTES NFR BLD: 30 % (ref 12–49)
MCH RBC QN AUTO: 29.9 PG (ref 26–34)
MCHC RBC AUTO-ENTMCNC: 33.8 G/DL (ref 30–36.5)
MCV RBC AUTO: 88.5 FL (ref 80–99)
MONOCYTES # BLD: 0.6 K/UL (ref 0–1)
MONOCYTES NFR BLD: 10 % (ref 5–13)
NEUTS SEG # BLD: 3.5 K/UL (ref 1.8–8)
NEUTS SEG NFR BLD: 56 % (ref 32–75)
NRBC # BLD: 0 K/UL (ref 0–0.01)
NRBC BLD-RTO: 0 PER 100 WBC
PLATELET # BLD AUTO: 233 K/UL (ref 150–400)
PMV BLD AUTO: 9.7 FL (ref 8.9–12.9)
POTASSIUM SERPL-SCNC: 4.3 MMOL/L (ref 3.5–5.1)
PROT SERPL-MCNC: 7.6 G/DL (ref 6.4–8.2)
RBC # BLD AUTO: 4.88 M/UL (ref 4.1–5.7)
SODIUM SERPL-SCNC: 139 MMOL/L (ref 136–145)
WBC # BLD AUTO: 6.3 K/UL (ref 4.1–11.1)

## 2024-07-19 PROCEDURE — 99214 OFFICE O/P EST MOD 30 MIN: CPT | Performed by: NURSE PRACTITIONER

## 2024-07-19 PROCEDURE — G8417 CALC BMI ABV UP PARAM F/U: HCPCS | Performed by: NURSE PRACTITIONER

## 2024-07-19 PROCEDURE — 3079F DIAST BP 80-89 MM HG: CPT | Performed by: NURSE PRACTITIONER

## 2024-07-19 PROCEDURE — G8427 DOCREV CUR MEDS BY ELIG CLIN: HCPCS | Performed by: NURSE PRACTITIONER

## 2024-07-19 PROCEDURE — 1036F TOBACCO NON-USER: CPT | Performed by: NURSE PRACTITIONER

## 2024-07-19 PROCEDURE — 3075F SYST BP GE 130 - 139MM HG: CPT | Performed by: NURSE PRACTITIONER

## 2024-07-19 RX ORDER — OXYCODONE HYDROCHLORIDE AND ACETAMINOPHEN 5; 325 MG/1; MG/1
1 TABLET ORAL EVERY 8 HOURS PRN
Qty: 20 TABLET | Refills: 0 | Status: SHIPPED | OUTPATIENT
Start: 2024-07-19 | End: 2024-08-02

## 2024-07-19 RX ORDER — SILDENAFIL 50 MG/1
50 TABLET, FILM COATED ORAL DAILY PRN
Qty: 20 TABLET | Refills: 2 | Status: SHIPPED | OUTPATIENT
Start: 2024-07-19

## 2024-07-19 NOTE — PROGRESS NOTES
Tom Muniz is a 48 y.o. male  Chief Complaint   Patient presents with    Follow-up    Jaw Pain     Pt states left side of jaw and neck hurts, pt states he want to discuss some problems       \"Have you been to the ER, urgent care clinic since your last visit?  Hospitalized since your last visit?\"    NO    “Have you seen or consulted any other health care providers outside of [facility] since your last visit?”    NO    Click Here for Release of Records Request    BP (!) 133/91 (Site: Right Upper Arm, Position: Sitting, Cuff Size: Medium Adult)   Pulse 67   Temp (!) 95.8 °F (35.4 °C)   Resp 18   Ht 1.778 m (5' 10\")   Wt (!) 139.1 kg (306 lb 11.2 oz)   BMI 44.01 kg/m²     
 Normal range of motion.  Neck supple.    Cardiovascular: normal rate, regular rhythm and normal heart sounds.   Pulmonary/Chest:  Effort normal and breath sounds normal.  No respiratory distress.  No wheezes, no rales.   Abdominal: soft, normal  bowel sounds.   Musculoskeletal:  No edema, no tenderness.  No calf tenderness or edema.   Neurological:  Alert, oriented to person, place and time.    Skin: skin is warm and dry.       Assessment/ Plan:        Diagnosis Orders   1. 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 Neuropathic pain        3. Primary hypertension  Comprehensive Metabolic Panel      4. Elevated glucose  Hemoglobin A1C      5. Chronic midline low back pain without sciatica  oxyCODONE-acetaminophen (ENDOCET) 5-325 MG per tablet      6. Acquired hypothyroidism  Thyroid Cascade Profile                I have discussed the diagnosis with the patient and the intended plan as seen in the above orders.  The patient has received an after-visit summary and questions were answered concerning future plans.  Pt conveyed understanding of plan.      Medication Side Effects and Warnings were discussed with patient: Yes  Patient Labs were reviewed: Yes  Patient Past Records were reviewed:  Yes    YANICK Mcfadden NP

## 2024-07-21 LAB — TSH SERPL DL<=0.05 MIU/L-ACNC: 4.34 UIU/ML (ref 0.45–4.5)

## 2024-07-23 DIAGNOSIS — M54.16 LUMBAR RADICULOPATHY: Primary | ICD-10-CM

## 2024-12-17 ENCOUNTER — OFFICE VISIT (OUTPATIENT)
Facility: CLINIC | Age: 49
End: 2024-12-17
Payer: MEDICARE

## 2024-12-17 VITALS
DIASTOLIC BLOOD PRESSURE: 88 MMHG | HEART RATE: 83 BPM | HEIGHT: 70 IN | BODY MASS INDEX: 44.54 KG/M2 | TEMPERATURE: 98.7 F | RESPIRATION RATE: 18 BRPM | WEIGHT: 311.1 LBS | OXYGEN SATURATION: 99 % | SYSTOLIC BLOOD PRESSURE: 130 MMHG

## 2024-12-17 DIAGNOSIS — E78.2 MIXED HYPERLIPIDEMIA: ICD-10-CM

## 2024-12-17 DIAGNOSIS — E03.9 HYPOTHYROIDISM, UNSPECIFIED TYPE: ICD-10-CM

## 2024-12-17 DIAGNOSIS — N52.9 ERECTILE DYSFUNCTION, UNSPECIFIED ERECTILE DYSFUNCTION TYPE: ICD-10-CM

## 2024-12-17 DIAGNOSIS — R73.03 PREDIABETES: ICD-10-CM

## 2024-12-17 DIAGNOSIS — R73.09 ELEVATED GLUCOSE: Primary | ICD-10-CM

## 2024-12-17 DIAGNOSIS — I10 PRIMARY HYPERTENSION: ICD-10-CM

## 2024-12-17 DIAGNOSIS — K21.9 GASTROESOPHAGEAL REFLUX DISEASE WITHOUT ESOPHAGITIS: ICD-10-CM

## 2024-12-17 DIAGNOSIS — M54.16 LUMBAR RADICULOPATHY: ICD-10-CM

## 2024-12-17 DIAGNOSIS — C09.1 MALIGNANT NEOPLASM OF TONSILLAR PILLARS (ANTERIOR) (POSTERIOR) (HCC): ICD-10-CM

## 2024-12-17 PROBLEM — B37.0 CANDIDIASIS OF MOUTH: Status: RESOLVED | Noted: 2018-09-10 | Resolved: 2024-12-17

## 2024-12-17 PROCEDURE — 3079F DIAST BP 80-89 MM HG: CPT | Performed by: NURSE PRACTITIONER

## 2024-12-17 PROCEDURE — 99214 OFFICE O/P EST MOD 30 MIN: CPT | Performed by: NURSE PRACTITIONER

## 2024-12-17 PROCEDURE — 3074F SYST BP LT 130 MM HG: CPT | Performed by: NURSE PRACTITIONER

## 2024-12-17 RX ORDER — AMLODIPINE BESYLATE 10 MG/1
10 TABLET ORAL DAILY
Qty: 90 TABLET | Refills: 1 | Status: SHIPPED | OUTPATIENT
Start: 2024-12-17

## 2024-12-17 RX ORDER — LEVOTHYROXINE SODIUM 25 UG/1
25 TABLET ORAL DAILY
Qty: 90 TABLET | Refills: 1 | Status: SHIPPED | OUTPATIENT
Start: 2024-12-17

## 2024-12-17 RX ORDER — OXYCODONE AND ACETAMINOPHEN 5; 325 MG/1; MG/1
1 TABLET ORAL EVERY 8 HOURS PRN
Qty: 20 TABLET | Refills: 0 | Status: SHIPPED | OUTPATIENT
Start: 2024-12-17 | End: 2025-01-16

## 2024-12-17 RX ORDER — OMEPRAZOLE 40 MG/1
40 CAPSULE, DELAYED RELEASE ORAL
Qty: 90 CAPSULE | Refills: 1 | Status: SHIPPED | OUTPATIENT
Start: 2024-12-17

## 2024-12-17 NOTE — PROGRESS NOTES
\"Have you been to the ER, urgent care clinic since your last visit?  Hospitalized since your last visit?\"    NO    “Have you seen or consulted any other health care providers outside our system since your last visit?”    NO           Chief Complaint   Patient presents with    6 Month Follow-Up     /88 (Site: Right Upper Arm, Position: Sitting, Cuff Size: Medium Adult)   Pulse 83   Temp 98.7 °F (37.1 °C) (Temporal)   Resp 18   Ht 1.778 m (5' 10\")   Wt (!) 141.1 kg (311 lb 1.6 oz)   SpO2 99%   BMI 44.64 kg/m²     
heart sounds.   Pulmonary/Chest:  Effort normal and breath sounds normal.  No respiratory distress.  No wheezes, no rales.   Abdominal: soft, normal  bowel sounds.   Musculoskeletal:  No edema, no tenderness.  No calf tenderness or edema.   Neurological:  Alert, oriented to person, place and time.    Skin: skin is warm and dry.       Assessment/ Plan:     Follow-up and Dispositions    Return in about 6 months (around 6/17/2025), or if symptoms worsen or fail to improve, for high blood pressure follow up, in person.        checked  Labs  Cont care w specialists (oncology)  GI INI     Diagnosis Orders   1. Elevated glucose        2. 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 Hypothyroidism, unspecified type  Thyroid Cascade Profile      4. Prediabetes  Hemoglobin A1C      5. Mixed hyperlipidemia        6. Erectile dysfunction, unspecified erectile dysfunction type        7. Primary hypertension  Basic Metabolic Panel              I have discussed the diagnosis with the patient and the intended plan as seen in the above orders.  The patient has received an after-visit summary and questions were answered concerning future plans.  Pt conveyed understanding of plan.      Medication Side Effects and Warnings were discussed with patient: Yes  Patient Labs were reviewed: Yes  Patient Past Records were reviewed:  Yes    YANICK Mcfadden NP

## 2025-02-21 ENCOUNTER — HOSPITAL ENCOUNTER (EMERGENCY)
Facility: HOSPITAL | Age: 50
Discharge: HOME | End: 2025-02-21
Payer: MEDICARE

## 2025-02-21 VITALS
SYSTOLIC BLOOD PRESSURE: 144 MMHG | DIASTOLIC BLOOD PRESSURE: 88 MMHG | HEART RATE: 88 BPM | BODY MASS INDEX: 42.23 KG/M2 | HEIGHT: 70 IN | TEMPERATURE: 98.6 F | WEIGHT: 295 LBS | OXYGEN SATURATION: 99 % | RESPIRATION RATE: 16 BRPM

## 2025-02-21 DIAGNOSIS — J10.1 INFLUENZA A: Primary | ICD-10-CM

## 2025-02-21 LAB
FLUAV RNA SPEC QL NAA+PROBE: DETECTED
FLUBV RNA SPEC QL NAA+PROBE: NOT DETECTED
SARS-COV-2 RNA RESP QL NAA+PROBE: NOT DETECTED
SOURCE: ABNORMAL

## 2025-02-21 PROCEDURE — 6370000000 HC RX 637 (ALT 250 FOR IP): Performed by: PHYSICIAN ASSISTANT

## 2025-02-21 PROCEDURE — 87636 SARSCOV2 & INF A&B AMP PRB: CPT

## 2025-02-21 PROCEDURE — 99283 EMERGENCY DEPT VISIT LOW MDM: CPT

## 2025-02-21 RX ORDER — OSELTAMIVIR PHOSPHATE 75 MG/1
75 CAPSULE ORAL 2 TIMES DAILY
Qty: 10 CAPSULE | Refills: 0 | Status: SHIPPED | OUTPATIENT
Start: 2025-02-21 | End: 2025-02-26

## 2025-02-21 RX ORDER — ACETAMINOPHEN 500 MG
1000 TABLET ORAL
Status: COMPLETED | OUTPATIENT
Start: 2025-02-21 | End: 2025-02-21

## 2025-02-21 RX ORDER — GUAIFENESIN 200 MG/10ML
400 LIQUID ORAL
Status: DISCONTINUED | OUTPATIENT
Start: 2025-02-21 | End: 2025-02-21 | Stop reason: HOSPADM

## 2025-02-21 RX ORDER — BENZONATATE 200 MG/1
200 CAPSULE ORAL 3 TIMES DAILY PRN
Qty: 30 CAPSULE | Refills: 0 | Status: SHIPPED | OUTPATIENT
Start: 2025-02-21 | End: 2025-03-03

## 2025-02-21 RX ADMIN — ACETAMINOPHEN 1000 MG: 500 TABLET ORAL at 20:10

## 2025-02-21 ASSESSMENT — PAIN SCALES - GENERAL: PAINLEVEL_OUTOF10: 10

## 2025-02-21 ASSESSMENT — PAIN - FUNCTIONAL ASSESSMENT: PAIN_FUNCTIONAL_ASSESSMENT: 0-10

## 2025-02-21 ASSESSMENT — PAIN DESCRIPTION - DESCRIPTORS: DESCRIPTORS: ACHING

## 2025-02-21 ASSESSMENT — PAIN DESCRIPTION - LOCATION: LOCATION: GENERALIZED;LEG

## 2025-02-21 NOTE — ED TRIAGE NOTES
Pt came in the ED with complaints of cough and generalized body aches started yesterday. Pt also reports pain on the right leg and inguinal area when coughing. Pt reports shortness of breath and chest pain when coughing.     Pt denies fever, vomiting and diarrhea.

## 2025-02-22 NOTE — ED PROVIDER NOTES
[facility] EMERGENCY DEPARTMENT  EMERGENCY DEPARTMENT ENCOUNTER       Pt Name: Tom Muniz  MRN: [id number]  Birthdate 1975  Date of evaluation: 2025  Provider: Nik Guardado PA-C   PCP: Jessica Balbuena APRN - NP  Note Started: 7:52 PM EST 25     CHIEF COMPLAINT       Chief Complaint   Patient presents with    Cold Symptoms    Leg Pain     Right leg pain and inguinal area        HISTORY OF PRESENT ILLNESS: 1 or more elements      History From: Patient  HPI Limitations: None     Tom Muniz is a 49 y.o. male who presents cough and bodyaches that started yesterday.  Patient states when he coughs he has pain to the right lower extremity as well as some shortness of breath and chest discomfort with coughing only.  He denies any known fevers or sick contacts.  He did take over-the-counter meds-DayQuil/NyQuil     Nursing Notes were all reviewed and agreed with or any disagreements were addressed in the HPI.   Please see MDM for additional details of HPI and ROS  REVIEW OF SYSTEMS      Review of Systems     Positives and Pertinent negatives as per HPI.    PAST HISTORY     Past Medical History:  Past Medical History:   Diagnosis Date    Candidiasis of mouth 09/10/2018    Tonsillar abscess 2018    Tonsillar cancer (HCC)     dx 2018       Past Surgical History:  Past Surgical History:   Procedure Laterality Date    HERNIA REPAIR         Family History:  No family history on file.    Social History:  Social History     Tobacco Use    Smoking status: Former     Current packs/day: 0.00     Types: Cigarettes     Quit date: 2017     Years since quittin.6    Smokeless tobacco: Never   Substance Use Topics    Alcohol use: No    Drug use: Yes     Types: Marijuana (Weed)       Allergies:  Allergies   Allergen Reactions    Nsaids Swelling    Aspirin Angioedema    Ibuprofen Angioedema    Naproxen Angioedema    Fish Oil      Other reaction(s): Unknown (comments)       CURRENT

## 2025-03-02 ENCOUNTER — APPOINTMENT (OUTPATIENT)
Facility: HOSPITAL | Age: 50
End: 2025-03-02
Payer: MEDICARE

## 2025-03-02 ENCOUNTER — HOSPITAL ENCOUNTER (EMERGENCY)
Facility: HOSPITAL | Age: 50
Discharge: HOME | End: 2025-03-02
Payer: MEDICARE

## 2025-03-02 VITALS
TEMPERATURE: 98 F | OXYGEN SATURATION: 96 % | SYSTOLIC BLOOD PRESSURE: 159 MMHG | BODY MASS INDEX: 42.23 KG/M2 | HEART RATE: 93 BPM | HEIGHT: 70 IN | DIASTOLIC BLOOD PRESSURE: 91 MMHG | WEIGHT: 295 LBS | RESPIRATION RATE: 20 BRPM

## 2025-03-02 DIAGNOSIS — H92.02 LEFT EAR PAIN: ICD-10-CM

## 2025-03-02 DIAGNOSIS — J02.9 ACUTE VIRAL PHARYNGITIS: Primary | ICD-10-CM

## 2025-03-02 LAB
GLUCOSE BLD STRIP.AUTO-MCNC: 120 MG/DL (ref 65–117)
S PYO DNA THROAT QL NAA+PROBE: NOT DETECTED
SERVICE CMNT-IMP: ABNORMAL

## 2025-03-02 PROCEDURE — 99284 EMERGENCY DEPT VISIT MOD MDM: CPT

## 2025-03-02 PROCEDURE — 87651 STREP A DNA AMP PROBE: CPT

## 2025-03-02 PROCEDURE — 82962 GLUCOSE BLOOD TEST: CPT

## 2025-03-02 PROCEDURE — 6370000000 HC RX 637 (ALT 250 FOR IP): Performed by: PHYSICIAN ASSISTANT

## 2025-03-02 PROCEDURE — 70360 X-RAY EXAM OF NECK: CPT

## 2025-03-02 RX ORDER — LIDOCAINE HYDROCHLORIDE 20 MG/ML
15 SOLUTION OROPHARYNGEAL
Status: COMPLETED | OUTPATIENT
Start: 2025-03-02 | End: 2025-03-02

## 2025-03-02 RX ORDER — ACETAMINOPHEN 500 MG
1000 TABLET ORAL EVERY 6 HOURS PRN
Qty: 20 TABLET | Refills: 0 | Status: SHIPPED | OUTPATIENT
Start: 2025-03-02

## 2025-03-02 RX ORDER — LIDOCAINE HYDROCHLORIDE 20 MG/ML
15 SOLUTION OROPHARYNGEAL PRN
Qty: 100 ML | Refills: 0 | Status: SHIPPED | OUTPATIENT
Start: 2025-03-02

## 2025-03-02 RX ORDER — ACETAMINOPHEN 500 MG
1000 TABLET ORAL
Status: COMPLETED | OUTPATIENT
Start: 2025-03-02 | End: 2025-03-02

## 2025-03-02 RX ADMIN — LIDOCAINE HYDROCHLORIDE 15 ML: 20 SOLUTION ORAL at 13:31

## 2025-03-02 RX ADMIN — ACETAMINOPHEN 1000 MG: 500 TABLET ORAL at 13:31

## 2025-03-02 ASSESSMENT — PAIN DESCRIPTION - LOCATION: LOCATION: THROAT;EAR

## 2025-03-02 ASSESSMENT — PAIN DESCRIPTION - ORIENTATION: ORIENTATION: LEFT

## 2025-03-02 ASSESSMENT — PAIN - FUNCTIONAL ASSESSMENT: PAIN_FUNCTIONAL_ASSESSMENT: 0-10

## 2025-03-02 ASSESSMENT — PAIN DESCRIPTION - DESCRIPTORS: DESCRIPTORS: ACHING;SORE

## 2025-03-02 ASSESSMENT — PAIN SCALES - GENERAL
PAINLEVEL_OUTOF10: 10
PAINLEVEL_OUTOF10: 7

## 2025-03-02 NOTE — ED NOTES
Pt presents ambulatory to ED complaining of sore throat and left sided ear pain x 3 days. Pt is alert and oriented x 4, RR even and unlabored, skin is warm and dry. Assesment completed and pt updated on plan of care.       Emergency Department Nursing Plan of Care       The Nursing Plan of Care is developed from the Nursing assessment and Emergency Department Attending provider initial evaluation.  The plan of care may be reviewed in the “ED Provider note”.    The Plan of Care was developed with the following considerations:   Patient / Family readiness to learn indicated by:verbalized understanding  Persons(s) to be included in education: patient  Barriers to Learning/Limitations:None    Signed     ROSEANNE BALLESTEROS RN    3/2/2025   1:40 PM

## 2025-03-02 NOTE — ED NOTES
Patient (s)  given copy of dc instructions and paper script(s) and 2 electronic scripts.  Patient (s)  verbalized understanding of instructions and script (s).  Patient given a current medication reconciliation form and verbalized understanding of their medications.   Patient (s verbalized understanding of the importance of discussing medications with  his or her physician or clinic they will be following up with.  Patient alert and oriented and in no acute distress.  Patient offered wheelchair from treatment area to hospital entrance, patient declined wheelchair.

## 2025-03-02 NOTE — ED PROVIDER NOTES
Collection Time: 03/02/25  1:17 PM    Specimen: Swab; Throat   Result Value Ref Range    Strep Grp A PCR Not detected NOTD     POCT Glucose    Collection Time: 03/02/25  1:56 PM   Result Value Ref Range    POC Glucose 120 (H) 65 - 117 mg/dL    Performed by: Ortiz RIVERA         EKG: When ordered, EKG's are interpreted by the Emergency Department Physician in the absence of a cardiologist.  Please see their note for interpretation of EKG.      RADIOLOGY:  Non-plain film images such as CT, Ultrasound and MRI are read by the radiologist. Plain radiographic images are visualized and preliminarily interpreted by the ED Provider with the below findings:     No acute process of soft tissue neck x-ray as independently interpreted by myself.     Interpretation per the Radiologist below, if available at the time of this note:     XR NECK SOFT TISSUE   Final Result      1. No acute abnormality appreciated.          Electronically signed by Bari Lockwood MD            PROCEDURES   Unless otherwise noted below, none  Procedures     CRITICAL CARE TIME   none    EMERGENCY DEPARTMENT COURSE and DIFFERENTIAL DIAGNOSIS/MDM   Initial assessment performed. The patients presenting problems have been discussed, and they are in agreement with the care plan formulated and outlined with them.  I have encouraged them to ask questions as they arise throughout their visit.    Vitals:    Vitals:    03/02/25 1300   BP: (!) 159/91   Pulse: 93   Resp: 20   Temp: 98 °F (36.7 °C)   TempSrc: Oral   SpO2: 96%   Weight: 133.8 kg (295 lb)   Height: 1.778 m (5' 10\")        Patient was given the following medications:  Medications   lidocaine viscous hcl (XYLOCAINE) 2 % solution 15 mL (15 mLs Mouth/Throat Given 3/2/25 1331)   acetaminophen (TYLENOL) tablet 1,000 mg (1,000 mg Oral Given 3/2/25 1331)       CONSULTS: (Who and What was discussed)  None      Chronic Conditions: history of cancer of soft palate, GERD, obesity, hypothyroidism, hyperlipidemia,

## 2025-03-04 ENCOUNTER — HOSPITAL ENCOUNTER (EMERGENCY)
Facility: HOSPITAL | Age: 50
Discharge: HOME | End: 2025-03-04
Attending: EMERGENCY MEDICINE
Payer: MEDICARE

## 2025-03-04 VITALS
RESPIRATION RATE: 18 BRPM | WEIGHT: 314 LBS | BODY MASS INDEX: 44.95 KG/M2 | HEIGHT: 70 IN | SYSTOLIC BLOOD PRESSURE: 183 MMHG | HEART RATE: 96 BPM | DIASTOLIC BLOOD PRESSURE: 117 MMHG | TEMPERATURE: 97.5 F | OXYGEN SATURATION: 99 %

## 2025-03-04 DIAGNOSIS — I16.0 HYPERTENSIVE URGENCY: ICD-10-CM

## 2025-03-04 DIAGNOSIS — L04.0 ACUTE CERVICAL LYMPHADENITIS: ICD-10-CM

## 2025-03-04 DIAGNOSIS — R51.9 ACUTE FACIAL PAIN: Primary | ICD-10-CM

## 2025-03-04 DIAGNOSIS — G50.1 ATYPICAL FACE PAIN: ICD-10-CM

## 2025-03-04 PROCEDURE — 6370000000 HC RX 637 (ALT 250 FOR IP): Performed by: EMERGENCY MEDICINE

## 2025-03-04 PROCEDURE — 99283 EMERGENCY DEPT VISIT LOW MDM: CPT

## 2025-03-04 PROCEDURE — 6360000002 HC RX W HCPCS: Performed by: EMERGENCY MEDICINE

## 2025-03-04 RX ORDER — CODEINE PHOSPHATE AND GUAIFENESIN 10; 100 MG/5ML; MG/5ML
5 SOLUTION ORAL 3 TIMES DAILY PRN
Qty: 75 ML | Refills: 0 | Status: SHIPPED | OUTPATIENT
Start: 2025-03-04 | End: 2025-03-09

## 2025-03-04 RX ORDER — PREDNISONE 5 MG/1
TABLET ORAL
Qty: 21 EACH | Refills: 0 | Status: SHIPPED | OUTPATIENT
Start: 2025-03-04

## 2025-03-04 RX ORDER — CODEINE PHOSPHATE AND GUAIFENESIN 10; 100 MG/5ML; MG/5ML
5 SOLUTION ORAL ONCE
Status: COMPLETED | OUTPATIENT
Start: 2025-03-04 | End: 2025-03-04

## 2025-03-04 RX ORDER — DEXAMETHASONE SODIUM PHOSPHATE 10 MG/ML
10 INJECTION, SOLUTION INTRAMUSCULAR; INTRAVENOUS ONCE
Status: COMPLETED | OUTPATIENT
Start: 2025-03-04 | End: 2025-03-04

## 2025-03-04 RX ADMIN — AMOXICILLIN AND CLAVULANATE POTASSIUM 1 TABLET: 875; 125 TABLET, FILM COATED ORAL at 01:57

## 2025-03-04 RX ADMIN — DEXAMETHASONE SODIUM PHOSPHATE 10 MG: 10 INJECTION, SOLUTION INTRAMUSCULAR; INTRAVENOUS at 01:57

## 2025-03-04 RX ADMIN — GUAIFENESIN AND CODEINE PHOSPHATE 5 ML: 100; 10 SOLUTION ORAL at 01:57

## 2025-03-04 ASSESSMENT — PAIN - FUNCTIONAL ASSESSMENT: PAIN_FUNCTIONAL_ASSESSMENT: 0-10

## 2025-03-04 ASSESSMENT — ENCOUNTER SYMPTOMS
EYE PAIN: 0
ABDOMINAL PAIN: 0
SORE THROAT: 1
VOMITING: 0
COUGH: 0
DIARRHEA: 0
SHORTNESS OF BREATH: 0
NAUSEA: 0
RHINORRHEA: 0

## 2025-03-04 ASSESSMENT — PAIN SCALES - GENERAL: PAINLEVEL_OUTOF10: 10

## 2025-03-04 ASSESSMENT — PAIN DESCRIPTION - LOCATION: LOCATION: THROAT

## 2025-03-04 NOTE — ED NOTES
Pt presents to ED ambulatory complaining of sore throat. Seen for same on 3/2 and diagnosed with viral pharyngitis. Hx of soft palate carcinoma. Pt is alert and oriented x 4, RR even and unlabored, skin is warm and dry. Assessment completed and pt updated on plan of care.  Call bell in reach.        Emergency Department Nursing Plan of Care       The Nursing Plan of Care is developed from the Nursing assessment and Emergency Department Attending provider initial evaluation.  The plan of care may be reviewed in the “ED Provider note”.    The Plan of Care was developed with the following considerations:   Patient / Family readiness to learn indicated by:verbalized understanding  Persons(s) to be included in education: patient  Barriers to Learning/Limitations:None    Signed

## 2025-03-04 NOTE — ED PROVIDER NOTES
EMERGENCY DEPARTMENT HISTORY AND PHYSICAL EXAM            Please note that this dictation was completed with the assistance of \"Dragon\", the computer voice recognition software. Quite often unanticipated grammatical, syntax, homophones, and other interpretive errors are inadvertently transcribed by the computer software. Please disregard these errors and any errors that have escaped final proofreading. Thank you.    Date of Evaluation: 03/04/25  Patient: Tom Muniz  Patient Age and Sex: 49 y.o. male   MRN: [id number]  CSN: [id number]  PCP: Jessica Balbuena APRN - NP    History of Present Illness     Chief Complaint   Patient presents with    Pharyngitis     History Provided By: Patient/family/EMS (if available)    History is limited by: Nothing     HPI: Tom Muniz, 49 y.o. male with past medical history as documented below presents to the ED with c/o of acute on chronic left ear and jaw pain.  Patient has a history of squamous cell cancer of the head and neck status post chemoradiation.  Patient is followed by Dr. Zabala with radiology oncology at [facility].  Patient states that he was seen here recently for possible strep throat and was diagnosed home with a viral pharyngitis.  Patient states taking prescribed viscous lidocaine without relief.  Denies any difficulty swallowing or voice changes.  Patient notes an area on the left neck that is painful.  Denies any difficulty swallowing or voice changes.. Pt denies any other exacerbating or ameliorating factors. There are no other complaints, changes or physical findings pertinent to the HPI at this time.    Nursing notes were all reviewed and agreed with or any disagreements were addressed in the HPI.    Past History   Past Medical History:  Past Medical History:   Diagnosis Date    Candidiasis of mouth 09/10/2018    Tonsillar abscess 05/2018    Tonsillar cancer (HCC)     dx 6/2018       Past Surgical History:  Past Surgical History:   Procedure

## 2025-03-04 NOTE — ED NOTES
Patient has been instructed that they have been given codeine* which contains opioids, benzodiazepines, or other sedating drugs. Patient is aware that they  will need to refrain from driving or operating heavy machinery after taking this medication.  Patient also instructed that they need to avoid drinking alcohol and using other products containing opioids, benzodiazepines, or other sedating drugs.  Patient verbalized understanding.

## 2025-03-04 NOTE — ED NOTES
MD reviewed discharge instructions and options with patient and patient verbalized understanding. RN reviewed discharge instructions using teachback method. Pt ambulated to exit without difficulty and in no signs of acute distress escorted by his wife, and a friend who is in the lobby  will drive home. No complaints or needs expressed at this time. Patient was counseled on medications prescribed at discharge. VSS, verbalized relief from most intense pain. Patient to call his PCP in the morning for appointment.

## 2025-03-06 ENCOUNTER — TELEPHONE (OUTPATIENT)
Facility: CLINIC | Age: 50
End: 2025-03-06

## 2025-03-06 NOTE — TELEPHONE ENCOUNTER
Pt is requesting a refill on the following. Pt states he is in a lot of pain.    lidocaine viscous hcl (XYLOCAINE) 2 % SOLN solution      Pt scheduled virtual for 3/10/25

## 2025-03-07 RX ORDER — LIDOCAINE HYDROCHLORIDE 20 MG/ML
15 SOLUTION OROPHARYNGEAL PRN
Qty: 100 ML | Refills: 0 | Status: CANCELLED | OUTPATIENT
Start: 2025-03-07

## 2025-03-10 ENCOUNTER — TELEMEDICINE (OUTPATIENT)
Facility: CLINIC | Age: 50
End: 2025-03-10

## 2025-03-10 DIAGNOSIS — E03.9 HYPOTHYROIDISM, UNSPECIFIED TYPE: ICD-10-CM

## 2025-03-10 DIAGNOSIS — C09.1 MALIGNANT NEOPLASM OF TONSILLAR PILLARS (ANTERIOR) (POSTERIOR) (HCC): Primary | ICD-10-CM

## 2025-03-10 DIAGNOSIS — R05.8 POST-VIRAL COUGH SYNDROME: ICD-10-CM

## 2025-03-10 DIAGNOSIS — E78.2 MIXED HYPERLIPIDEMIA: ICD-10-CM

## 2025-03-10 DIAGNOSIS — K21.9 GASTROESOPHAGEAL REFLUX DISEASE WITHOUT ESOPHAGITIS: ICD-10-CM

## 2025-03-10 RX ORDER — CODEINE PHOSPHATE AND GUAIFENESIN 10; 100 MG/5ML; MG/5ML
5 SOLUTION ORAL 3 TIMES DAILY PRN
Qty: 100 ML | Refills: 0 | Status: SHIPPED | OUTPATIENT
Start: 2025-03-10 | End: 2025-03-17

## 2025-03-10 RX ORDER — LEVOTHYROXINE SODIUM 50 UG/1
50 TABLET ORAL DAILY
Qty: 90 TABLET | Refills: 0 | Status: SHIPPED | OUTPATIENT
Start: 2025-03-10

## 2025-03-10 RX ORDER — LIDOCAINE HYDROCHLORIDE 20 MG/ML
15 SOLUTION OROPHARYNGEAL EVERY 4 HOURS PRN
Qty: 100 ML | Refills: 0 | Status: SHIPPED | OUTPATIENT
Start: 2025-03-10

## 2025-03-10 SDOH — ECONOMIC STABILITY: FOOD INSECURITY: WITHIN THE PAST 12 MONTHS, YOU WORRIED THAT YOUR FOOD WOULD RUN OUT BEFORE YOU GOT MONEY TO BUY MORE.: NEVER TRUE

## 2025-03-10 SDOH — ECONOMIC STABILITY: FOOD INSECURITY: WITHIN THE PAST 12 MONTHS, THE FOOD YOU BOUGHT JUST DIDN'T LAST AND YOU DIDN'T HAVE MONEY TO GET MORE.: NEVER TRUE

## 2025-03-10 ASSESSMENT — PATIENT HEALTH QUESTIONNAIRE - PHQ9
2. FEELING DOWN, DEPRESSED OR HOPELESS: NOT AT ALL
SUM OF ALL RESPONSES TO PHQ QUESTIONS 1-9: 0
1. LITTLE INTEREST OR PLEASURE IN DOING THINGS: NOT AT ALL
SUM OF ALL RESPONSES TO PHQ QUESTIONS 1-9: 0

## 2025-03-10 NOTE — PATIENT INSTRUCTIONS
[facility] Utility - Financial Resources*  (Call [employer] Way/211 if need more resources.)  Utilities  Privatext  What they offer: Partnership with the [facility] of . Assist with finding and applying for government funded programs and benefits. You can also update your benefits or report changes through [employer].  Website: https://www.[employer]/  Phone Number: 8335CALLVA (963-774-7564)    [employer]  What they offer: [employer] is [facility]'s energy assistance program of last resort for anyone who faces financial hardships from unemployment or family crisis.  Phone Number: 789.253.4243  Address: [address] [place] [address]  Website: https://www.[employer]/[place]/billing/billing-options/energyshare    Energy Assistance Programs (EAP) - [facility] of   What they offer: EAP assists low-income households in meeting their immediate home energy needs.      Website: https://www.[employer].[place].gov/benefit/ea/  Available assistance:   Crisis Assistance - Heating Emergencies  Fuel Assistance - Offset Heating Fuel Costs  Cooling Assistance - Applies to Cooling Utility Bills and Equipment  How to apply:  Online:  https://CerahelixvirginiaBe my eyes/  Call:  985.185.7001   Paper application:   Print application from https://www.[employer].[place].gov/benefit/ea/ and submit to your [facility] Department of     [facility] Department of   [facility] of  contacts: https://www.[facility].[place].[facility]/localagency/index.cgi  [place], [place] Utility Affordability Programs  https://LeanWagona.gov/public-utilities/billing  Call:  692.779.1425   Email:  inga@a.gov  Programs available:  Equal Monthly Payment Plan, [employer] Heat Program, [employer] Water Program, [employer] Water Conservation Program, Senior Assistance, Other Fuel Assistance Programs, PromisePay Payment Plans, Low-Income Household Water

## 2025-03-10 NOTE — PROGRESS NOTES
\"Have you been to the ER, urgent care clinic since your last visit?  Hospitalized since your last visit?\"    NO    “Have you seen or consulted any other health care providers outside our system since your last visit?”    NO           Chief Complaint   Patient presents with    Discuss Labs     Requesting new medication

## 2025-03-15 NOTE — ASSESSMENT & PLAN NOTE
Follow-up with oncology    Orders:    lidocaine viscous hcl (XYLOCAINE) 2 % SOLN solution; Take 15 mLs by mouth every 4 hours as needed for Irritation

## 2025-03-15 NOTE — ASSESSMENT & PLAN NOTE
Orders:    levothyroxine (SYNTHROID) 50 MCG tablet; Take 1 tablet by mouth Daily    Thyroid Cascade Profile; Future

## 2025-03-15 NOTE — PROGRESS NOTES
Tom Muniz, was evaluated through a synchronous (real-time) audio-video encounter. The patient (or guardian if applicable) is aware that this is a billable service, which includes applicable co-pays. This Virtual Visit was conducted with patient's (and/or legal guardian's) consent. Patient identification was verified, and a caregiver was present when appropriate.   The patient was located at Home: [address] [place], [address]  [place] [facility] [address]  Provider was located at Home [place]Appt [place] State[place]  Confirm you are appropriately licensed, registered, or certified to deliver care in the state where the patient is located as indicated above. If you are not or unsure, please re-schedule the visit: Yes, I confirm.     Tom Muniz (:  1975) is a Established patient, presenting virtually for evaluation of the following:      Below is the assessment and plan developed based on review of pertinent history, physical exam, labs, studies, and medications.     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 Pylori Antigen, Stool; Future    Hypothyroidism, unspecified type       Orders:    levothyroxine (SYNTHROID) 50 MCG tablet; Take 1 tablet by mouth Daily    Thyroid Cascade Profile; Future    Post-viral cough syndrome  Although he has a postviral cough I suspect some of this request is due to the pain in his throat will prescribe small amount this time but no further refills    Orders:    guaiFENesin-codeine (GUAIFENESIN AC) 100-10 MG/5ML liquid; Take 5 mLs by mouth 3 times daily as needed for Cough for up to 7 days. Max Daily Amount: 15 mLs      No follow-ups on file.       Subjective   HPI  Review of

## 2025-04-21 DIAGNOSIS — E03.9 HYPOTHYROIDISM, UNSPECIFIED TYPE: ICD-10-CM

## 2025-06-06 DIAGNOSIS — I10 PRIMARY HYPERTENSION: ICD-10-CM

## 2025-06-06 RX ORDER — AMLODIPINE BESYLATE 10 MG/1
10 TABLET ORAL DAILY
Qty: 90 TABLET | Refills: 1 | Status: SHIPPED | OUTPATIENT
Start: 2025-06-06

## 2025-06-07 DIAGNOSIS — E03.9 HYPOTHYROIDISM, UNSPECIFIED TYPE: ICD-10-CM

## 2025-06-12 RX ORDER — LEVOTHYROXINE SODIUM 50 UG/1
50 TABLET ORAL DAILY
Qty: 90 TABLET | Refills: 0 | Status: SHIPPED | OUTPATIENT
Start: 2025-06-12

## 2025-07-10 RX ORDER — OMEPRAZOLE 40 MG/1
40 CAPSULE, DELAYED RELEASE ORAL
Qty: 90 CAPSULE | Refills: 1 | Status: SHIPPED | OUTPATIENT
Start: 2025-07-10

## 2025-07-24 ENCOUNTER — HOSPITAL ENCOUNTER (EMERGENCY)
Facility: HOSPITAL | Age: 50
Discharge: HOME | End: 2025-07-24
Attending: EMERGENCY MEDICINE
Payer: MEDICARE

## 2025-07-24 ENCOUNTER — APPOINTMENT (OUTPATIENT)
Facility: HOSPITAL | Age: 50
End: 2025-07-24
Payer: MEDICARE

## 2025-07-24 VITALS
DIASTOLIC BLOOD PRESSURE: 111 MMHG | WEIGHT: 314 LBS | TEMPERATURE: 97.9 F | RESPIRATION RATE: 15 BRPM | HEART RATE: 85 BPM | OXYGEN SATURATION: 98 % | SYSTOLIC BLOOD PRESSURE: 189 MMHG | BODY MASS INDEX: 44.95 KG/M2 | HEIGHT: 70 IN

## 2025-07-24 DIAGNOSIS — I10 ESSENTIAL HYPERTENSION: ICD-10-CM

## 2025-07-24 DIAGNOSIS — R51.9 ACUTE NONINTRACTABLE HEADACHE, UNSPECIFIED HEADACHE TYPE: Primary | ICD-10-CM

## 2025-07-24 LAB
ANION GAP SERPL CALC-SCNC: 1 MMOL/L (ref 2–12)
BASOPHILS # BLD: 0 K/UL (ref 0–0.1)
BASOPHILS NFR BLD: 0 % (ref 0–1)
BUN SERPL-MCNC: 16 MG/DL (ref 6–20)
BUN/CREAT SERPL: 10 (ref 12–20)
CALCIUM SERPL-MCNC: 9.2 MG/DL (ref 8.5–10.1)
CHLORIDE SERPL-SCNC: 108 MMOL/L (ref 97–108)
CO2 SERPL-SCNC: 26 MMOL/L (ref 21–32)
CREAT SERPL-MCNC: 1.64 MG/DL (ref 0.7–1.3)
DIFFERENTIAL METHOD BLD: ABNORMAL
EKG ATRIAL RATE: 90 BPM
EKG DIAGNOSIS: NORMAL
EKG P AXIS: 39 DEGREES
EKG P-R INTERVAL: 174 MS
EKG Q-T INTERVAL: 346 MS
EKG QRS DURATION: 100 MS
EKG QTC CALCULATION (BAZETT): 423 MS
EKG R AXIS: 56 DEGREES
EKG T AXIS: 32 DEGREES
EKG VENTRICULAR RATE: 90 BPM
EOSINOPHIL # BLD: 0.09 K/UL (ref 0–0.4)
EOSINOPHIL NFR BLD: 1.3 % (ref 0–7)
ERYTHROCYTE [DISTWIDTH] IN BLOOD BY AUTOMATED COUNT: 14.5 % (ref 11.5–14.5)
GLUCOSE SERPL-MCNC: 86 MG/DL (ref 65–100)
HCT VFR BLD AUTO: 47.9 % (ref 36.6–50.3)
HGB BLD-MCNC: 15.6 G/DL (ref 12.1–17)
IMM GRANULOCYTES # BLD AUTO: 0.02 K/UL (ref 0–0.04)
IMM GRANULOCYTES NFR BLD AUTO: 0.3 % (ref 0–0.5)
LYMPHOCYTES # BLD: 1.73 K/UL (ref 0.8–3.5)
LYMPHOCYTES NFR BLD: 25.1 % (ref 12–49)
MCH RBC QN AUTO: 29.5 PG (ref 26–34)
MCHC RBC AUTO-ENTMCNC: 32.6 G/DL (ref 30–36.5)
MCV RBC AUTO: 90.5 FL (ref 80–99)
MONOCYTES # BLD: 0.6 K/UL (ref 0–1)
MONOCYTES NFR BLD: 8.7 % (ref 5–13)
NEUTS SEG # BLD: 4.46 K/UL (ref 1.8–8)
NEUTS SEG NFR BLD: 64.6 % (ref 32–75)
NRBC # BLD: 0 K/UL (ref 0–0.01)
NRBC BLD-RTO: 0 PER 100 WBC
PLATELET # BLD AUTO: 200 K/UL (ref 150–400)
PMV BLD AUTO: 8.8 FL (ref 8.9–12.9)
POTASSIUM SERPL-SCNC: 5.4 MMOL/L (ref 3.5–5.1)
RBC # BLD AUTO: 5.29 M/UL (ref 4.1–5.7)
SODIUM SERPL-SCNC: 135 MMOL/L (ref 136–145)
WBC # BLD AUTO: 6.9 K/UL (ref 4.1–11.1)

## 2025-07-24 PROCEDURE — 6370000000 HC RX 637 (ALT 250 FOR IP): Performed by: EMERGENCY MEDICINE

## 2025-07-24 PROCEDURE — 73562 X-RAY EXAM OF KNEE 3: CPT

## 2025-07-24 PROCEDURE — 80048 BASIC METABOLIC PNL TOTAL CA: CPT

## 2025-07-24 PROCEDURE — 99285 EMERGENCY DEPT VISIT HI MDM: CPT

## 2025-07-24 PROCEDURE — 85025 COMPLETE CBC W/AUTO DIFF WBC: CPT

## 2025-07-24 PROCEDURE — 93005 ELECTROCARDIOGRAM TRACING: CPT | Performed by: EMERGENCY MEDICINE

## 2025-07-24 PROCEDURE — 36415 COLL VENOUS BLD VENIPUNCTURE: CPT

## 2025-07-24 RX ORDER — BUTALBITAL, ACETAMINOPHEN AND CAFFEINE 50; 325; 40 MG/1; MG/1; MG/1
2 TABLET ORAL
Status: COMPLETED | OUTPATIENT
Start: 2025-07-24 | End: 2025-07-24

## 2025-07-24 RX ORDER — AMLODIPINE BESYLATE 5 MG/1
10 TABLET ORAL
Status: COMPLETED | OUTPATIENT
Start: 2025-07-24 | End: 2025-07-24

## 2025-07-24 RX ADMIN — AMLODIPINE BESYLATE 10 MG: 5 TABLET ORAL at 17:14

## 2025-07-24 RX ADMIN — BUTALBITAL, ACETAMINOPHEN, AND CAFFEINE 2 TABLET: 325; 50; 40 TABLET ORAL at 17:14

## 2025-07-24 ASSESSMENT — PAIN - FUNCTIONAL ASSESSMENT
PAIN_FUNCTIONAL_ASSESSMENT: 0-10
PAIN_FUNCTIONAL_ASSESSMENT: PREVENTS OR INTERFERES SOME ACTIVE ACTIVITIES AND ADLS

## 2025-07-24 ASSESSMENT — PAIN DESCRIPTION - FREQUENCY: FREQUENCY: INTERMITTENT

## 2025-07-24 ASSESSMENT — PAIN DESCRIPTION - PAIN TYPE: TYPE: ACUTE PAIN

## 2025-07-24 ASSESSMENT — PAIN DESCRIPTION - DESCRIPTORS: DESCRIPTORS: THROBBING

## 2025-07-24 ASSESSMENT — PAIN DESCRIPTION - LOCATION: LOCATION: HEAD

## 2025-07-24 ASSESSMENT — PAIN DESCRIPTION - ORIENTATION: ORIENTATION: ANTERIOR

## 2025-07-24 ASSESSMENT — PAIN SCALES - GENERAL: PAINLEVEL_OUTOF10: 8

## 2025-07-24 ASSESSMENT — PAIN DESCRIPTION - ONSET: ONSET: ON-GOING

## 2025-07-24 NOTE — ED PROVIDER NOTES
[facility] EMERGENCY DEPARTMENT  EMERGENCY DEPARTMENT ENCOUNTER       Pt Name: Tom Muniz  MRN: [id number]  Birthdate 1975  Date of evaluation: 2025  Provider: Earl Sykes DO   PCP: Jessica Balbuena APRN - NP  Note Started: 6:29 PM EDT 25     CHIEF COMPLAINT       Chief Complaint   Patient presents with    Hypertension     Pt was at an OP follow up visit. Pt has not had his HTN meds today. Pt was HTN in office 161/115 and is having a HA.     Knee Pain     Pt also c/o R knee pain, no injury        HISTORY OF PRESENT ILLNESS: 1 or more elements      History From: patient, History limited by: none     Tom Muniz is a 49 y.o. male presents to the emergency department for evaluation of hypertension and headache.       Please See MDM for Additional Details of the HPI/PMH  Nursing Notes were all reviewed and agreed with or any disagreements were addressed in the HPI.     REVIEW OF SYSTEMS        Positives and Pertinent negatives as per HPI.    PAST HISTORY     Past Medical History:  Past Medical History:   Diagnosis Date    Candidiasis of mouth 09/10/2018    Tonsillar abscess 2018    Tonsillar cancer (HCC)     dx 2018       Past Surgical History:  Past Surgical History:   Procedure Laterality Date    HERNIA REPAIR         Family History:  No family history on file.    Social History:  Social History     Tobacco Use    Smoking status: Former     Current packs/day: 0.00     Types: Cigarettes     Quit date: 2017     Years since quittin.0    Smokeless tobacco: Never   Substance Use Topics    Alcohol use: No    Drug use: Yes     Types: Marijuana (Weed)       Allergies:  Allergies   Allergen Reactions    Nsaids Swelling    Aspirin Angioedema    Ibuprofen Angioedema    Naproxen Angioedema    Fish Oil      Other reaction(s): Unknown (comments)       CURRENT MEDICATIONS      Previous Medications    ACETAMINOPHEN (TYLENOL) 500 MG TABLET    Take 2 tablets by mouth every 6 hours